# Patient Record
Sex: FEMALE | Race: WHITE | NOT HISPANIC OR LATINO | Employment: OTHER | URBAN - METROPOLITAN AREA
[De-identification: names, ages, dates, MRNs, and addresses within clinical notes are randomized per-mention and may not be internally consistent; named-entity substitution may affect disease eponyms.]

---

## 2018-02-07 ENCOUNTER — MEDICAL CORRESPONDENCE (OUTPATIENT)
Dept: CARDIOLOGY | Facility: CLINIC | Age: 83
End: 2018-02-07
Payer: MEDICARE

## 2018-02-07 ENCOUNTER — TRANSFERRED RECORDS (OUTPATIENT)
Dept: HEALTH INFORMATION MANAGEMENT | Facility: CLINIC | Age: 83
End: 2018-02-07

## 2018-02-07 PROCEDURE — 99214 OFFICE O/P EST MOD 30 MIN: CPT | Mod: ZP | Performed by: INTERNAL MEDICINE

## 2021-02-17 ENCOUNTER — TRANSFERRED RECORDS (OUTPATIENT)
Dept: HEALTH INFORMATION MANAGEMENT | Facility: CLINIC | Age: 86
End: 2021-02-17

## 2021-03-01 ENCOUNTER — TRANSFERRED RECORDS (OUTPATIENT)
Dept: HEALTH INFORMATION MANAGEMENT | Facility: CLINIC | Age: 86
End: 2021-03-01

## 2021-03-01 LAB — EJECTION FRACTION: NORMAL %

## 2021-03-09 ENCOUNTER — TRANSFERRED RECORDS (OUTPATIENT)
Dept: HEALTH INFORMATION MANAGEMENT | Facility: CLINIC | Age: 86
End: 2021-03-09

## 2021-03-18 ENCOUNTER — TRANSFERRED RECORDS (OUTPATIENT)
Dept: HEALTH INFORMATION MANAGEMENT | Facility: CLINIC | Age: 86
End: 2021-03-18

## 2021-03-18 ENCOUNTER — MEDICAL CORRESPONDENCE (OUTPATIENT)
Dept: CARDIOLOGY | Facility: CLINIC | Age: 86
End: 2021-03-18

## 2021-03-18 LAB
CREAT SERPL-MCNC: 1.66 MG/DL
GFR SERPL CREATININE-BSD FRML MDRD: 29.3 ML/MIN/1.73M2

## 2021-03-22 ENCOUNTER — CARE COORDINATION (OUTPATIENT)
Dept: CARDIOLOGY | Facility: CLINIC | Age: 86
End: 2021-03-22

## 2021-03-22 DIAGNOSIS — I71.9 AORTIC ANEURYSM (H): ICD-10-CM

## 2021-03-22 DIAGNOSIS — Z95.2 S/P AVR (AORTIC VALVE REPLACEMENT): ICD-10-CM

## 2021-03-22 DIAGNOSIS — I50.22 CHRONIC SYSTOLIC HEART FAILURE (H): Primary | ICD-10-CM

## 2021-03-22 DIAGNOSIS — I42.8 NONISCHEMIC CARDIOMYOPATHY (H): ICD-10-CM

## 2021-03-22 DIAGNOSIS — I42.0 DILATED CARDIOMYOPATHY (H): ICD-10-CM

## 2021-03-22 DIAGNOSIS — I35.1 AORTIC VALVE INSUFFICIENCY, ETIOLOGY OF CARDIAC VALVE DISEASE UNSPECIFIED: ICD-10-CM

## 2021-03-24 PROBLEM — I71.9 AORTIC ANEURYSM (H): Status: ACTIVE | Noted: 2021-03-24

## 2021-03-24 PROBLEM — Z95.2 S/P AVR (AORTIC VALVE REPLACEMENT): Status: ACTIVE | Noted: 2021-03-24

## 2021-03-24 PROBLEM — I42.8 NONISCHEMIC CARDIOMYOPATHY (H): Status: ACTIVE | Noted: 2021-03-24

## 2021-03-24 PROBLEM — I35.1 AORTIC INSUFFICIENCY: Status: ACTIVE | Noted: 2021-03-24

## 2021-03-24 LAB
CREAT SERPL-MCNC: 1.75 MG/DL
GFR SERPL CREATININE-BSD FRML MDRD: 27 ML/MIN/1.73M2

## 2021-03-30 DIAGNOSIS — I48.0 PAROXYSMAL ATRIAL FIBRILLATION (H): Primary | ICD-10-CM

## 2021-03-30 DIAGNOSIS — I71.9 AORTIC ANEURYSM (H): ICD-10-CM

## 2021-03-30 DIAGNOSIS — I42.8 NONISCHEMIC CARDIOMYOPATHY (H): ICD-10-CM

## 2021-04-02 ENCOUNTER — CARE COORDINATION (OUTPATIENT)
Dept: CARDIOLOGY | Facility: CLINIC | Age: 86
End: 2021-04-02

## 2021-04-02 RX ORDER — ACETAMINOPHEN 500 MG
500-1000 TABLET ORAL EVERY 8 HOURS PRN
COMMUNITY

## 2021-04-02 RX ORDER — DIGOXIN 125 MCG
125 TABLET ORAL
COMMUNITY

## 2021-04-02 RX ORDER — OMEPRAZOLE 40 MG/1
40 CAPSULE, DELAYED RELEASE ORAL DAILY
COMMUNITY

## 2021-04-02 RX ORDER — WARFARIN SODIUM 2.5 MG/1
TABLET ORAL
Status: ON HOLD | COMMUNITY
End: 2021-06-26

## 2021-04-02 RX ORDER — POTASSIUM CHLORIDE 750 MG/1
2 TABLET, EXTENDED RELEASE ORAL
COMMUNITY

## 2021-04-02 RX ORDER — PAROXETINE 10 MG/1
10 TABLET, FILM COATED ORAL EVERY MORNING
COMMUNITY

## 2021-04-02 RX ORDER — CALCIUM CARBONATE 500 MG/1
1 TABLET, CHEWABLE ORAL EVERY 4 HOURS PRN
COMMUNITY

## 2021-04-02 RX ORDER — FERROUS SULFATE 325(65) MG
TABLET ORAL
COMMUNITY

## 2021-04-02 RX ORDER — LOVASTATIN 40 MG
40 TABLET ORAL AT BEDTIME
COMMUNITY

## 2021-04-02 RX ORDER — TORSEMIDE 20 MG/1
20 TABLET ORAL DAILY
COMMUNITY

## 2021-04-02 RX ORDER — TRIAMCINOLONE ACETONIDE 55 UG/1
2 SPRAY, METERED NASAL DAILY PRN
COMMUNITY

## 2021-04-02 RX ORDER — FEXOFENADINE HCL 180 MG/1
180 TABLET ORAL DAILY
COMMUNITY

## 2021-04-02 RX ORDER — CALCITRIOL 0.25 UG/1
0.25 CAPSULE, LIQUID FILLED ORAL DAILY
COMMUNITY

## 2021-04-02 RX ORDER — METOPROLOL SUCCINATE 25 MG/1
25 TABLET, EXTENDED RELEASE ORAL DAILY
Status: ON HOLD | COMMUNITY
End: 2021-06-26

## 2021-04-02 RX ORDER — VITS A,C,E/LUTEIN/MINERALS 300MCG-200
1 TABLET ORAL 2 TIMES DAILY
COMMUNITY

## 2021-04-02 NOTE — PROGRESS NOTES
Patient's last creatinine was 1.75 with GFR 27.  Called and instructed her to hold her torsemide on Sunday and Monday am. She will have labs on arrival for CT.  In addition, patient is taking metoprolol daily. Called and requested she take it early before she arrives for CTA/coronaries and drink extra water on Sunday. Dr. Good informed of creatinine via staff message earlier today.

## 2021-04-05 ENCOUNTER — HOSPITAL ENCOUNTER (OUTPATIENT)
Dept: CT IMAGING | Facility: CLINIC | Age: 86
End: 2021-04-05
Attending: INTERNAL MEDICINE
Payer: MEDICARE

## 2021-04-05 ENCOUNTER — OFFICE VISIT (OUTPATIENT)
Dept: CARDIOLOGY | Facility: CLINIC | Age: 86
End: 2021-04-05
Attending: SURGERY
Payer: MEDICARE

## 2021-04-05 VITALS
HEART RATE: 61 BPM | OXYGEN SATURATION: 96 % | RESPIRATION RATE: 16 BRPM | DIASTOLIC BLOOD PRESSURE: 68 MMHG | SYSTOLIC BLOOD PRESSURE: 134 MMHG

## 2021-04-05 VITALS
HEART RATE: 60 BPM | BODY MASS INDEX: 24.07 KG/M2 | WEIGHT: 130.8 LBS | OXYGEN SATURATION: 93 % | SYSTOLIC BLOOD PRESSURE: 112 MMHG | DIASTOLIC BLOOD PRESSURE: 56 MMHG | HEIGHT: 62 IN

## 2021-04-05 DIAGNOSIS — I42.8 NONISCHEMIC CARDIOMYOPATHY (H): ICD-10-CM

## 2021-04-05 DIAGNOSIS — Z95.2 S/P AVR (AORTIC VALVE REPLACEMENT): ICD-10-CM

## 2021-04-05 DIAGNOSIS — I48.0 PAROXYSMAL ATRIAL FIBRILLATION (H): ICD-10-CM

## 2021-04-05 DIAGNOSIS — I42.0 DILATED CARDIOMYOPATHY (H): ICD-10-CM

## 2021-04-05 DIAGNOSIS — I71.9 AORTIC ANEURYSM (H): ICD-10-CM

## 2021-04-05 DIAGNOSIS — I71.20 THORACIC AORTIC ANEURYSM WITHOUT RUPTURE (H): Primary | ICD-10-CM

## 2021-04-05 DIAGNOSIS — I35.1 AORTIC VALVE INSUFFICIENCY, ETIOLOGY OF CARDIAC VALVE DISEASE UNSPECIFIED: ICD-10-CM

## 2021-04-05 DIAGNOSIS — I50.22 CHRONIC SYSTOLIC HEART FAILURE (H): ICD-10-CM

## 2021-04-05 PROBLEM — E78.5 HYPERLIPIDEMIA LDL GOAL <130: Status: ACTIVE | Noted: 2021-04-05

## 2021-04-05 PROBLEM — U07.1 CLINICAL DIAGNOSIS OF COVID-19: Status: ACTIVE | Noted: 2021-04-05

## 2021-04-05 PROBLEM — G47.33 OSA (OBSTRUCTIVE SLEEP APNEA): Status: ACTIVE | Noted: 2021-04-05

## 2021-04-05 PROBLEM — D64.9 ANEMIA: Status: ACTIVE | Noted: 2021-04-05

## 2021-04-05 PROBLEM — K21.9 GASTROESOPHAGEAL REFLUX DISEASE WITHOUT ESOPHAGITIS: Status: ACTIVE | Noted: 2021-04-05

## 2021-04-05 PROBLEM — M85.80 OSTEOPENIA: Status: ACTIVE | Noted: 2021-04-05

## 2021-04-05 PROBLEM — K92.2 GI BLEED: Status: ACTIVE | Noted: 2021-04-05

## 2021-04-05 PROBLEM — M31.6 TEMPORAL GIANT CELL ARTERITIS (H): Status: ACTIVE | Noted: 2021-04-05

## 2021-04-05 PROBLEM — H35.30 MACULAR DEGENERATION: Status: ACTIVE | Noted: 2021-04-05

## 2021-04-05 PROBLEM — C85.10 LARGE B-CELL LYMPHOMA (H): Status: ACTIVE | Noted: 2021-04-05

## 2021-04-05 PROBLEM — Z90.49 HISTORY OF CHOLECYSTECTOMY: Status: ACTIVE | Noted: 2021-04-05

## 2021-04-05 PROBLEM — Z95.810 ICD (IMPLANTABLE CARDIOVERTER-DEFIBRILLATOR) IN PLACE: Status: ACTIVE | Noted: 2021-04-05

## 2021-04-05 PROBLEM — Z85.118 HISTORY OF LUNG CANCER: Status: ACTIVE | Noted: 2021-04-05

## 2021-04-05 PROBLEM — Z90.2 HISTORY OF LOBECTOMY OF LUNG: Status: ACTIVE | Noted: 2021-04-05

## 2021-04-05 LAB
6 MIN WALK (FT): 838 FT
6 MIN WALK (M): 255 M
ALBUMIN SERPL-MCNC: 3.6 G/DL (ref 3.4–5)
ALP SERPL-CCNC: 50 U/L (ref 40–150)
ALT SERPL W P-5'-P-CCNC: 18 U/L (ref 0–50)
ANION GAP SERPL CALCULATED.3IONS-SCNC: 5 MMOL/L (ref 3–14)
AST SERPL W P-5'-P-CCNC: 12 U/L (ref 0–45)
BILIRUB SERPL-MCNC: 0.6 MG/DL (ref 0.2–1.3)
BUN SERPL-MCNC: 30 MG/DL (ref 7–30)
CALCIUM SERPL-MCNC: 9.5 MG/DL (ref 8.5–10.1)
CHLORIDE SERPL-SCNC: 107 MMOL/L (ref 94–109)
CO2 SERPL-SCNC: 27 MMOL/L (ref 20–32)
CREAT SERPL-MCNC: 1.45 MG/DL (ref 0.52–1.04)
ERYTHROCYTE [DISTWIDTH] IN BLOOD BY AUTOMATED COUNT: 17.6 % (ref 10–15)
GFR SERPL CREATININE-BSD FRML MDRD: 32 ML/MIN/{1.73_M2}
GLUCOSE SERPL-MCNC: 111 MG/DL (ref 70–99)
HCT VFR BLD AUTO: 29.2 % (ref 35–47)
HGB BLD-MCNC: 9.8 G/DL (ref 11.7–15.7)
INR PPP: 2.4 (ref 0.86–1.14)
MCH RBC QN AUTO: 31.2 PG (ref 26.5–33)
MCHC RBC AUTO-ENTMCNC: 33.6 G/DL (ref 31.5–36.5)
MCV RBC AUTO: 93 FL (ref 78–100)
PLATELET # BLD AUTO: 141 10E9/L (ref 150–450)
POTASSIUM SERPL-SCNC: 4.1 MMOL/L (ref 3.4–5.3)
PROT SERPL-MCNC: 6.8 G/DL (ref 6.8–8.8)
RBC # BLD AUTO: 3.14 10E12/L (ref 3.8–5.2)
SODIUM SERPL-SCNC: 140 MMOL/L (ref 133–144)
WBC # BLD AUTO: 6.5 10E9/L (ref 4–11)

## 2021-04-05 PROCEDURE — 71275 CT ANGIOGRAPHY CHEST: CPT | Mod: 26 | Performed by: STUDENT IN AN ORGANIZED HEALTH CARE EDUCATION/TRAINING PROGRAM

## 2021-04-05 PROCEDURE — G1004 CDSM NDSC: HCPCS | Performed by: STUDENT IN AN ORGANIZED HEALTH CARE EDUCATION/TRAINING PROGRAM

## 2021-04-05 PROCEDURE — G0463 HOSPITAL OUTPT CLINIC VISIT: HCPCS | Mod: 25

## 2021-04-05 PROCEDURE — 85027 COMPLETE CBC AUTOMATED: CPT | Performed by: INTERNAL MEDICINE

## 2021-04-05 PROCEDURE — 85610 PROTHROMBIN TIME: CPT | Performed by: INTERNAL MEDICINE

## 2021-04-05 PROCEDURE — 250N000013 HC RX MED GY IP 250 OP 250 PS 637: Performed by: STUDENT IN AN ORGANIZED HEALTH CARE EDUCATION/TRAINING PROGRAM

## 2021-04-05 PROCEDURE — 71275 CT ANGIOGRAPHY CHEST: CPT | Mod: ME

## 2021-04-05 PROCEDURE — 250N000011 HC RX IP 250 OP 636: Performed by: INTERNAL MEDICINE

## 2021-04-05 PROCEDURE — 36415 COLL VENOUS BLD VENIPUNCTURE: CPT | Performed by: INTERNAL MEDICINE

## 2021-04-05 PROCEDURE — 258N000003 HC RX IP 258 OP 636: Performed by: STUDENT IN AN ORGANIZED HEALTH CARE EDUCATION/TRAINING PROGRAM

## 2021-04-05 PROCEDURE — 99204 OFFICE O/P NEW MOD 45 MIN: CPT | Performed by: SURGERY

## 2021-04-05 PROCEDURE — 75574 CT ANGIO HRT W/3D IMAGE: CPT | Mod: 26 | Performed by: STUDENT IN AN ORGANIZED HEALTH CARE EDUCATION/TRAINING PROGRAM

## 2021-04-05 PROCEDURE — 94618 PULMONARY STRESS TESTING: CPT

## 2021-04-05 PROCEDURE — 75574 CT ANGIO HRT W/3D IMAGE: CPT | Mod: MG

## 2021-04-05 PROCEDURE — 80053 COMPREHEN METABOLIC PANEL: CPT | Performed by: INTERNAL MEDICINE

## 2021-04-05 RX ORDER — NITROGLYCERIN 0.4 MG/1
.4-.8 TABLET SUBLINGUAL
Status: DISCONTINUED | OUTPATIENT
Start: 2021-04-05 | End: 2021-04-06 | Stop reason: HOSPADM

## 2021-04-05 RX ORDER — DIPHENHYDRAMINE HYDROCHLORIDE 50 MG/ML
25-50 INJECTION INTRAMUSCULAR; INTRAVENOUS
Status: DISCONTINUED | OUTPATIENT
Start: 2021-04-05 | End: 2021-04-06 | Stop reason: HOSPADM

## 2021-04-05 RX ORDER — METOPROLOL TARTRATE 1 MG/ML
5-15 INJECTION, SOLUTION INTRAVENOUS
Status: DISCONTINUED | OUTPATIENT
Start: 2021-04-05 | End: 2021-04-06 | Stop reason: HOSPADM

## 2021-04-05 RX ORDER — ONDANSETRON 2 MG/ML
4 INJECTION INTRAMUSCULAR; INTRAVENOUS
Status: DISCONTINUED | OUTPATIENT
Start: 2021-04-05 | End: 2021-04-06 | Stop reason: HOSPADM

## 2021-04-05 RX ORDER — METOPROLOL TARTRATE 25 MG/1
25-100 TABLET, FILM COATED ORAL
Status: COMPLETED | OUTPATIENT
Start: 2021-04-05 | End: 2021-04-05

## 2021-04-05 RX ORDER — IOPAMIDOL 755 MG/ML
120 INJECTION, SOLUTION INTRAVASCULAR ONCE
Status: COMPLETED | OUTPATIENT
Start: 2021-04-05 | End: 2021-04-05

## 2021-04-05 RX ORDER — ACYCLOVIR 200 MG/1
0-1 CAPSULE ORAL
Status: DISCONTINUED | OUTPATIENT
Start: 2021-04-05 | End: 2021-04-06 | Stop reason: HOSPADM

## 2021-04-05 RX ORDER — LOSARTAN POTASSIUM 25 MG/1
12.5 TABLET ORAL AT BEDTIME
Status: ON HOLD | COMMUNITY
Start: 2020-07-17 | End: 2021-06-26

## 2021-04-05 RX ORDER — METHYLPREDNISOLONE SODIUM SUCCINATE 125 MG/2ML
125 INJECTION, POWDER, LYOPHILIZED, FOR SOLUTION INTRAMUSCULAR; INTRAVENOUS
Status: DISCONTINUED | OUTPATIENT
Start: 2021-04-05 | End: 2021-04-06 | Stop reason: HOSPADM

## 2021-04-05 RX ORDER — DIPHENHYDRAMINE HCL 25 MG
25 CAPSULE ORAL
Status: DISCONTINUED | OUTPATIENT
Start: 2021-04-05 | End: 2021-04-06 | Stop reason: HOSPADM

## 2021-04-05 RX ORDER — IVABRADINE 5 MG/1
5-15 TABLET, FILM COATED ORAL
Status: COMPLETED | OUTPATIENT
Start: 2021-04-05 | End: 2021-04-05

## 2021-04-05 RX ADMIN — SODIUM CHLORIDE 250 ML: 0.9 INJECTION, SOLUTION INTRAVENOUS at 09:00

## 2021-04-05 RX ADMIN — NITROGLYCERIN 0.8 MG: 0.4 TABLET SUBLINGUAL at 10:19

## 2021-04-05 RX ADMIN — IOPAMIDOL 120 ML: 755 INJECTION, SOLUTION INTRAVENOUS at 11:05

## 2021-04-05 RX ADMIN — METOPROLOL TARTRATE 25 MG: 25 TABLET, FILM COATED ORAL at 09:15

## 2021-04-05 RX ADMIN — IVABRADINE 15 MG: 5 TABLET, FILM COATED ORAL at 09:15

## 2021-04-05 ASSESSMENT — PAIN SCALES - GENERAL: PAINLEVEL: NO PAIN (0)

## 2021-04-05 ASSESSMENT — MIFFLIN-ST. JEOR: SCORE: 986.55

## 2021-04-05 NOTE — LETTER
4/5/2021      RE: Racquel Emmanuel  [address] [facility] [address]       Service Date: 04/05/2021    I was requested to see Ms. Emmanuel for evaluation of  ascending aortic aneurysm.    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  She had previously had an aortic valve replacement in the [facility].  The patient does complain of some shortness of breath with fatigue and exertion.  She denies any fever, chills, syncope, palpitations or chest pain. The patient was seen by my colleague, Dr. Kadi Good, in 2018 to assess her functional capacity given her lobectomy for lung cancer.  She also had an opinion at the [facility] and decided not to have surgery.  Last year both she and her  developed COVID.  She also had a significant subdural hematoma and delayed any evaluation for surgery.    PAST MEDICAL HISTORY:    1.  Nonischemic cardiomyopathy, status post aortic valve replacement.    2.  Large-cell lymphoma.  3.  Hypertension.  4.  Gastroesophageal reflux disease.  5.  Anemia.  6.  Dyslipidemia.  7.  Aortic aneurysm.    PAST SURGICAL HISTORY:  Aortic valve replacement in 2015, right middle lobectomy, hysterectomy, cataracts.    SOCIAL HISTORY:  Denies current alcohol, drug or tobacco abuse.    FAMILY HISTORY:  Reviewed.  Medication list reviewed.    REVIEW OF SYSTEMS:  A 10-point review of systems unremarkable other than noted in history and physical.    PHYSICAL EXAMINATION:    VITAL SIGNS:  She is afebrile, blood pressure 140/74, heart rate 68.   GENERAL:  She is awake, alert, oriented x3, appears comfortable at rest.  HEART:  S1, S2 normal, no S3, no murmurs.  RESPIRATORY:  Bibasilar rhonchi or crepitations.  ABDOMEN:  Bowel sounds present, nondistended, nontender.   LOWER EXTREMITIES:  Warm and perfused.  No pedal edema.    NEUROLOGIC:  No focal motor deficits.   EYES:  Pupils equal and reactive.   VASCULAR:  Pulses are normal.  ENT:  Within normal limits.    IMAGING STUDIES:  Review of CT scan, which showed a very large saccular aneurysm of the proximal ascending aorta.  The saccular aneurysm's depth at this level is 43 mm, and the maximal size of the saccular aneurysm is 81 mm.  The aneurysm neck is 21 mm.  A second smaller saccular aneurysm also emerges from the anterior aspect of the ascending aorta.  CT angiogram shows no hemodynamically significant coronary artery disease.  There is also a bioprosthetic valve noted in the aortic position.    ASSESSMENT AND PLAN:  An 86-year-old female status post aortic valve replacement in 2015, now with enlarging ascending aortic aneurysm.  In my opinion, this seems likely related in some ways to her surgery.  Given her age and comorbid conditions, surgery is clearly high-risk.  Given its size, though I think I would be willing to offer surgery.  I did discuss the risks and benefits of surgery with the patient and family inlcuding risk of death, stroke, bleeding and infection and they wished to consider and think about it before deciding on surgery.  If you have any questions regarding this care, please contact me.    Gokul Smith MD    cc:  Kadi Good MD       D: 2021   T: 2021   MT: RUSTYMT    Name:     ABRAM BALDERAS  MRN:      [id number]        Account:      [id number]   :      1935           Service Date: 2021       Document: [id number]

## 2021-04-05 NOTE — NURSING NOTE
Chief Complaint   Patient presents with     New Patient     New consult for redo sternotomy, ascending aortic aneurysm repair     Vitals were taken and medication reconciled.    IRAIS Kamara  3:12 PM

## 2021-04-05 NOTE — LETTER
4/5/2021      RE: Racquel Emmanuel  [address] [facility] [address]       Dear Colleague,    Thank you for the opportunity to participate in the care of your patient, Racquel Emmanuel, at the [facility] [place] [facility]. Please see a copy of my visit note below.    See dictated.      Please do not hesitate to contact me if you have any questions/concerns.     Sincerely,     Gokul Smith MD

## 2021-04-06 LAB — FIO2-PRE: 24 %

## 2021-04-06 NOTE — NURSING NOTE
Patient seen today for consultation for ascending aortic aneurysm repair    Surgery procedure explained to patient and her spouse and daughter and all questions and concerns were answered and addressed.     Risks and benefits of surgery were discussed with patient (and all present) including risk of death, stroke, bleeding, cardiac ischemia, wound infection, renal failure, arrhythmias and possible pacemaker implantation.  Risks of serious complications are ~ 10% for this patient. Patient accepts these risks and is willing to proceed with surgery.     Dr Smith to review patient's CT with colleagues and make decision if surgery is best way to proceed.    Pre surgery preparation folder with instructions for surgery preparation and recovery will be mailed to the patient if patient goes forward with surgery.      Patient and family verbalized understanding of all instructions and will call with any questions or concerns.

## 2021-04-08 ENCOUNTER — CARE COORDINATION (OUTPATIENT)
Dept: CARDIOLOGY | Facility: CLINIC | Age: 86
End: 2021-04-08

## 2021-04-08 NOTE — PROGRESS NOTES
Per Dr Smith he will perform the aneurysm repair for this patient as joint surgery with Dr Miracle Jordan. Called patient and discussed surgery and she will talk it over with her  and children and call this nurse on Monday 04/12.

## 2021-04-15 ENCOUNTER — CARE COORDINATION (OUTPATIENT)
Dept: CARDIOLOGY | Facility: CLINIC | Age: 86
End: 2021-04-15

## 2021-04-15 DIAGNOSIS — Z01.810 PRE-OPERATIVE CARDIOVASCULAR EXAMINATION: Primary | ICD-10-CM

## 2021-04-15 DIAGNOSIS — Z79.01 LONG TERM CURRENT USE OF ANTICOAGULANT THERAPY: ICD-10-CM

## 2021-04-15 DIAGNOSIS — R09.89 OTHER SPECIFIED SYMPTOMS AND SIGNS INVOLVING THE CIRCULATORY AND RESPIRATORY SYSTEMS: ICD-10-CM

## 2021-04-15 NOTE — PROGRESS NOTES
Called patient to discuss scheduling of surgery.  Patient would like to wait until after Mother's Day on May 9th to have surgery.  Also discussed having pre op one week prior to surgery to give the surgeons and anesthesia/PAC time to be sure patient is optimized prior to procedure.  Patient agreed to this plan.  Patient will call this writer with the name and phone to her dentist and this nurse will fax dental clearance to them for signing.     Entered all pre op orders and sent  a message to schedule.

## 2021-04-16 ENCOUNTER — PREP FOR PROCEDURE (OUTPATIENT)
Dept: CARDIOLOGY | Facility: CLINIC | Age: 86
End: 2021-04-16

## 2021-04-16 ENCOUNTER — CARE COORDINATION (OUTPATIENT)
Dept: CARDIOLOGY | Facility: CLINIC | Age: 86
End: 2021-04-16

## 2021-04-16 DIAGNOSIS — I71.21 ASCENDING AORTIC ANEURYSM (H): Primary | ICD-10-CM

## 2021-04-16 NOTE — PROGRESS NOTES
Patient's daughter Dona called to discuss her mother's surgery, outcomes, recovery and risks if she goes through with surgery.  Of note daughter states her mother has a hx of lung cancer and was recently found to have three new lung nodules by her Oncologist in [place].  These nodules were also noted on her CT chest completed here at the [facility].  Patient is is undergo a PET scan in [place] but not until June.  Suggested to daughter to call the oncologist to move PET to early May so the diagnosis will be known prior to heart surgery.  Daughter states she will call.  Per Dona she and her sister do not want her mother to undergo this surgery but the patient and her spouse want her to go through with it.  Will update Dr Smith and Dr Jordan with lung nodule findings. Sent list of pre op tests and surgery dates to  and she will call patient to schedule.

## 2021-04-19 NOTE — TELEPHONE ENCOUNTER
FUTURE VISIT INFORMATION      SURGERY INFORMATION:    jg ozuna/luzmaria    Consult: ov     RECORDS REQUESTED FROM:       Primary Care Provider: Nina Orellana MD- [facility]    Pertinent Medical History: taylor, aortic insufficiency, aortic aneurysm, paroxysmal atrial fibrillation     Most recent EKG+ Tracin/15/21    Most recent ECHO: 19- [place]    Most recent Coronary Angiogram: 21    Most recent PFT's: 21

## 2021-04-20 PROBLEM — I71.21 ASCENDING AORTIC ANEURYSM (H): Status: ACTIVE | Noted: 2021-04-20

## 2021-04-26 DIAGNOSIS — Z79.01 LONG TERM CURRENT USE OF ANTICOAGULANT THERAPY: Primary | ICD-10-CM

## 2021-04-26 NOTE — LETTER
April 26, 2021          Dear DAISY Clement [facility]    CARDIOTHORACIC SURGERY PRE-OP INSTRUCTIONS  Your Heart Surgery is scheduled with Dr Gokul Smith and Dr Magnolia Jordan  On Wednesday May 26th at 7:30 am.  Please arrive at 5:30 am.      Report to the information desk in the front lobby of the hospital at [address] [facility], [place] [phone]. When you walk in the main entrance of the hospital it is directly in front of you. Ask for an escort or the  can help direct you. You will then be escorted or directed to  on the third floor for your surgery preparation.     Beginning January 21st.  [facility] has updated its visitor policy.  At this time due to the Coronavirus, only one consistent visitor will be allowed per adult patient for the patient's entire stay.  Surgical patients can have one visitor only during the preoperative phase, and one person may escort an adult patient to their outpatient clinic appointments.  All visitors will be screened, each time they visit, and must pass screening before entry.  Individuals who are sick and not seeking care or have known exposure to COVID 19 are not allowed to visit.  Visitors under the age of 18 are not allowed to enter. Hospital visiting hours are 8:00 am to 8:30 pm. Visiting policies will be strictly enforced.  This policy is subject to change as the COVID virus continues to evolve. Masks are required 100% of the time.     COVID 19 TESTING  You will be required to undergo COVID 19 testing prior the angiogram and your surgery.  You will be contacted to schedule this test within 48 hours of your surgery.      After the COVID test please protect yourself by following the CDC recommendations for disease prevention.  Wash hands regularly with soap and water and use hand  if soap and water is not available, wear a face mask, separate yourself from others by 6 feet and keep your hands away from your face.        Call  your surgery coordinator Staci Quintanilla RN or the [facility] number ([phone]) if you develop   any of the following symptoms; fever, cough, shortness of breath, sore throat, runny or stuffy nose, muscle or body aches, headaches, fatigue, vomiting or diarrhea.      You will spend approximately 5 - 7 days in the hospital depending on how quickly you recover.      INSTRUCTIONS PRIOR TO SURGERY    Please review this letter and the enclosed booklet and other information in this surgery folder carefully and call Staci Quintanilla RN at [phone] with any questions or concerns.      MEDICATIONS    ASPIRIN is okay to take up to the day before your surgery but NOT the day of your surgery. Take your other medications as you normally would until the day of surgery unless instructed otherwise. If you do not take aspirin do not start aspirin unless instructed otherwise.      IF you are taking a blood thinner, (Coumadin, Warfarin, Plavix, Pradaxa, Effient, Brilinta, Pletal, Eliquis, Xarelto or other antiplatelet),  please inform your surgery team as soon as possible as  these medications may need to be stopped for several days (3-7) prior your surgery.    We will need to bridge your coumadin (Warfarin) with enoxaparin (Lovenox) for a few days prior to surgery. Please take your last dose of coumadin on Thursday May 20th.  Have your primary care clinic check your INR on Friday May 21st in the morning and call Staci Quintanilla RN with the results.  If your INR is subtherapeutic you will be instructed to take the enoxaparin injections as follows:     1. On Saturday May 22 start your 1st dose of enoxaparin in the evening.   2. On Sunday and Monday May 23rd and May 24th inject one enoxaparin shot in the morning and one in     the evening 12 hours apart.  3. On Tuesday May 25th inject the last enoxaparin shot in the morning.     A prescription for Lovenon (enoxaparin) was sent to the [facility] pharmacy.      STOP TAKING  these medications. STOP ALL ANTIINFLAMMATORY MEDICATIONS: (Ibuprofen, Aleve, Advil, Celebrex, Votaren, Ketoprofen, and Naproxen).  Stop ALL SUPPLEMENTS 10 days prior to your surgery. This includes stopping Co-Q 10, vitamin E and all fish oil supplements.   Please check the labels on any OTC eye vitamins you may be taking.  They often contain vitamin E and should be stopped 10 days prior to surgery.      HISTORY AND PHYSICAL:  LABS and IMAGING  All tests and procedures must be within 30 days of your surgery date.     You do NOT need to fast for the blood work.      You have a pre-op clinic visits beginning at Noon on Monday May 10th in the [facility], [address] [place] [address]. A  or Coordinator will assist you. The Pre Assessment/Anesthesia Clinic is on the fifth floor.  The laboratory and radiology is on the first floor.          Your schedule is as follows:    12:00 pm Carotid Ultrasound  1:15 pm Chest X-Ray  1:40 pm EKG  2:00 pm Labs  2:45 pm PAC (Pre Assessment and Anesthesia Clinic)  This is your pre op physical  4:00 pm Clinic visit with Dr Smith      DO NOT EAT ANY SOLID FOODS AFTER MIDNIGHT or the morning of your surgery. NO MILK, MILK PRODUCTS, SMOOTHIES 8 HOURS PRIOR TO SURGERY.      DO NOT EAT ANYTHING, however you may have any clear liquids; water, black coffee (sugar okay), clear tea, sprite, ginger ale, apple juice, Gatorade or any clear liquid up to two hours before your surgery time. (No chase tea) No milk, or milk products, no smoothies or juice with pulp.     No alcohol for 24 hours prior to surgery.       MEDICATIONS THE MORNING OF SURGERY  Take your metoprolol (Toprol XL), omeprazole (Prilosec) and paroxetine (Paxil) the morning of your surgery with a drink of clear liquid.  Please tell your anesthesiologist what medication you took and at what time.       BELONGINGS  Do not bring personal belongings, jewelry, money, valuables, toiletries or  medications to the hospital the morning of your surgery. You may pack a bag and give it to a family member or friend to bring the following day or when needed.   Please remove all jewelry, including body piercings. Cautery instruments are used during surgery that may pose a risk of burns if a body piercing is left in during surgery.       Do bring a photo ID and insurance card.  A copy of your health care directives, if you have one.  Glasses and hearing aids (bring cases).  You cannot wear contact lenses during the surgery.  Inhaler(s) and eye drops if you use them, tell the staff about them when you arrive. Bring your CPAP machine or breathing device, if you use them.  If you have a pacemaker or ICD (cardiac defibrillator) bring the ID card.  If you have an implanted stimulator, bring the remote control.  If you are a legal guardian bring a copy of the certified (court-stamped) guardianship papers.        Call Odessa our patient , with any questions or concerns about scheduling. She can be reached by phone at [phone] between 8 AM and 4:30PM Monday through Friday. Our answering service will  calls outside of those business hours.   If you have questions about your medications, test results or have a change in your health status call Staci Quintanilla RN at [phone] during regular business hours.                On weekends or after 4:30 please call [phone] and ask the  to page the Cardiothoracic Fellow, Nurse Practitioner, Physician Assistant or Staff on call that weekend.     Please feel free to call me or our office with any questions.    Thank you,         Staci Quintanilla RN  Cardiothoracic Surgery Coordinator  [phone]  Direct Phone  [phone]  Fax

## 2021-04-26 NOTE — PROGRESS NOTES
Called and spoke to patient regarding her surgery date and pre op scheduled.  Also reviewed with patient her Lovenox bridging for her coumadin and INR needed on May 21st.  Will send script for Lovenox to patient's pharmacy in [facility].  Patient verbalized understanding of all information and mailed patient letter, pre op education folder and map.    Ordered enoxaparin 0.6 ml per injection for 4 days prior to surgery.  Patient's creatine clearance is 30.5, borderline for weight based dosing.  Ok per Daryn Jiménez PA-C and coumadin clinic with provider ok.     Date: 4/26/2021    Time of Call: 1:49 PM     Diagnosis:  Long term use anticoagualtion     [ TORB ] Ordering provider: Daryn Jiménez PA-C    Order: Lovenox 0.6 ml per injecton     Order received by: Staci Quintanilla RN       Follow-up/additional notes: patient verbalized understanding, instructions mailed to patient.

## 2021-05-07 NOTE — PROGRESS NOTES
Service Date: 04/05/2021    I was requested to see Ms. Emmanuel for evaluation of  ascending aortic aneurysm.    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  She had previously had an aortic valve replacement in the [facility].  The patient does complain of some shortness of breath with fatigue and exertion.  She denies any fever, chills, syncope, palpitations or chest pain. The patient was seen by my colleague, Dr. Kadi Good, in 2018 to assess her functional capacity given her lobectomy for lung cancer.  She also had an opinion at the [facility] and decided not to have surgery.  Last year both she and her  developed COVID.  She also had a significant subdural hematoma and delayed any evaluation for surgery.    PAST MEDICAL HISTORY:    1.  Nonischemic cardiomyopathy, status post aortic valve replacement.    2.  Large-cell lymphoma.  3.  Hypertension.  4.  Gastroesophageal reflux disease.  5.  Anemia.  6.  Dyslipidemia.  7.  Aortic aneurysm.    PAST SURGICAL HISTORY:  Aortic valve replacement in 2015, right middle lobectomy, hysterectomy, cataracts.    SOCIAL HISTORY:  Denies current alcohol, drug or tobacco abuse.    FAMILY HISTORY:  Reviewed.  Medication list reviewed.    REVIEW OF SYSTEMS:  A 10-point review of systems unremarkable other than noted in history and physical.    PHYSICAL EXAMINATION:    VITAL SIGNS:  She is afebrile, blood pressure 140/74, heart rate 68.   GENERAL:  She is awake, alert, oriented x3, appears comfortable at rest.  HEART:  S1, S2 normal, no S3, no murmurs.  RESPIRATORY:  Bibasilar rhonchi or crepitations.  ABDOMEN:  Bowel sounds present, nondistended, nontender.   LOWER EXTREMITIES:  Warm and perfused.  No pedal edema.   NEUROLOGIC:  No focal motor deficits.   EYES:  Pupils equal and reactive.   VASCULAR:   Pulses are normal.  ENT:  Within normal limits.    IMAGING STUDIES:  Review of CT scan, which showed a very large saccular aneurysm of the proximal ascending aorta.  The saccular aneurysm's depth at this level is 43 mm, and the maximal size of the saccular aneurysm is 81 mm.  The aneurysm neck is 21 mm.  A second smaller saccular aneurysm also emerges from the anterior aspect of the ascending aorta.  CT angiogram shows no hemodynamically significant coronary artery disease.  There is also a bioprosthetic valve noted in the aortic position.    ASSESSMENT AND PLAN:  An 86-year-old female status post aortic valve replacement in 2015, now with enlarging ascending aortic aneurysm.  In my opinion, this seems likely related in some ways to her surgery.  Given her age and comorbid conditions, surgery is clearly high-risk.  Given its size, though I think I would be willing to offer surgery.  I did discuss the risks and benefits of surgery with the patient and family inlcuding risk of death, stroke, bleeding and infection and they wished to consider and think about it before deciding on surgery.  If you have any questions regarding this care, please contact me.    Gokul Smith MD        D: 2021   T: 2021   MT: [facility]    Name:     ABRAM BALDERAS  MRN:      [id number]        Account:      [id number]   :      1935           Service Date: 2021       Document: [id number]    cc:  Kadi Good MD

## 2021-05-07 NOTE — PROGRESS NOTES
Preoperative Assessment Center Medication History Note    Medication history completed on May 7, 2021 by this writer. See Epic admission navigator for prior to admission medications. Operating room staff will still need to confirm medications and last dose information on day of surgery.     Medication history interview sources:  patient interview, med list send from her home pharmacy who packages her meds in pill bubble packs for her ([facility]).     Changes made to PTA medication list (reason)  Added: none  Deleted: none  Changed: digoxin sig, iron dose/sig, losartan sig, paxil dose,     Additional medication history information (including reliability of information, actions taken by pharmacist):    -- Patient declines being on any other prescription or over-the-counter medications    Prior to Admission medications    Medication Sig Last Dose Taking? Auth Provider   acetaminophen (TYLENOL) 500 MG tablet Take 500-1,000 mg by mouth every 8 hours as needed for mild pain Taking Yes Reported, Patient   calcitRIOL (ROCALTROL) 0.25 MCG capsule Take 0.25 mcg by mouth daily Taking Yes Reported, Patient   digoxin (LANOXIN) 125 MCG tablet Take 125 mcg by mouth three times a week Take on Mon,  Wed, Fri Taking Yes Reported, Patient   ferrous sulfate (FEROSUL) 325 (65 Fe) MG tablet Take 1 tablet by mouth twice daily on three days a week (Mon Wed and Fri only) Taking Yes Reported, Patient   fexofenadine (ALLEGRA) 180 MG tablet Take 180 mg by mouth daily Taking Yes Reported, Patient   losartan (COZAAR) 25 MG tablet Take 12.5 mg by mouth At Bedtime  Taking Yes Reported, Patient   lovastatin (MEVACOR) 40 MG tablet Take 40 mg by mouth At Bedtime  Taking Yes Reported, Patient   metoprolol succinate ER (TOPROL-XL) 25 MG 24 hr tablet Take 25 mg by mouth daily Taking Yes Reported, Patient   multivitamin (OCUVITE) TABS tablet Take 1 tablet by mouth 2 times daily Taking Yes Reported, Patient   omeprazole (PRILOSEC) 40 MG  DR capsule Take 40 mg by mouth daily Taking Yes Reported, Patient   PARoxetine (PAXIL) 10 MG tablet Take 10 mg by mouth every morning  Taking Yes Reported, Patient   potassium chloride ER (K-TAB/KLOR-CON) 10 MEQ CR tablet Take 2 tablets by mouth daily (with dinner)  Taking Yes Reported, Patient   torsemide (DEMADEX) 20 MG tablet Take 20 mg by mouth daily Taking Yes Reported, Patient   warfarin ANTICOAGULANT (COUMADIN) 2.5 MG tablet Take 2.5 mg on Wednesday and Take 5 mg on all other days of the week (or as directed). Takes in the evening. Taking Yes Reported, Patient   calcium carbonate (TUMS) 500 MG chewable tablet Take 1 chew tab by mouth every 4 hours as needed for heartburn Unknown  Reported, Patient   enoxaparin ANTICOAGULANT (LOVENOX ANTICOAGULANT) 60 MG/0.6ML syringe 1 injection on 05/22 in evening, 2 injections 05/23 and 05/24 1 in morning & 1 in evening,1 injection 05/25 in morning.  Patient not taking: Reported on 5/7/2021 Not Taking  Gokul Smith MD   triamcinolone (NASACORT) 55 MCG/ACT nasal aerosol Spray 2 sprays into both nostrils daily as needed Unknown  Reported, Patient            Medication history completed by: Reagan [facility]

## 2021-05-07 NOTE — PHARMACY - PREOPERATIVE ASSESSMENT CENTER
Anticoagulation Note - Preoperative Assessment Center (PAC) Pharmacist     Patient was interviewed on May 7, 2021 as a part of PAC clinic appointment. The purpose of this note is to document the perioperative anticoagulation plan outlined by the providers caring for Racquel Emmanuel.     Current Regimen  Anticoagulation Regimen as of May 7, 2021: warfarin 2.5 mg on Wednesday and 5 mg on all other days of the week. Takes in the evening.   Indication: afib (CHADSVASC=5 female, HF, HTN, AGEx2... Denies history of stroke/TIA/VTE). Note does have h/o bioprosthetic AVR  Prescriber:  Dr. Nina Orellana  Expected Duration of therapy: indefinite    Creatinine   Date Value Ref Range Status   04/05/2021 1.45 (H) 0.52 - 1.04 mg/dL Final   CrCl ~22mL/min    Perioperative plan  Racquel Emmanuel is scheduled for redo sternotomy, ascending aortic aneurysm repair on 5/26/21 with Dr. Smith and Dr. Jordan and the perioperative anticoagulation plan is to hold warfarin x5 days pre op (last dose 5/20 PM) and bridge with lovenox.  After discussion with Staci Floyd RN and CVTS team will plan to addend lovenox plan as outlined at the bottom of this note.       Resumption of anticoagulation after procedure will be based on surgery team assessment of bleeding risks and complications.  This plan may require re-assessment and modification by her primary team in the perioperative setting depending on patients clinical situation.        Reagan Franklin, [facility]  May 7, 2021  9:04 AM          Please take your last dose of coumadin on Thursday May 20th.  Have your primary care clinic check your INR on Friday May 21st in the morning and call Staci Quintanilla RN with the results.  If your INR is subtherapeutic you will be instructed to take the enoxaparin injections as follows:      1. On Saturday May 22 start your 1st dose of enoxaparin (LOVENOX) 60 mg subcutaneously in the evening.   2. On Sunday and Monday May 23rd and May 24th inject one enoxaparin  (LOVENOX) shot in the in the EVENING ONLY (only once daily dosing due to kidney dysfunction).   3. On Tuesday May 25th Do NOT inject any enoxaparin (LOVENOX).      A prescription for Lovenox (enoxaparin) was sent to the [facility] pharmacy. Will have different instructions on the Rx, please instead follow the above instructions.

## 2021-05-10 ENCOUNTER — ANCILLARY PROCEDURE (OUTPATIENT)
Dept: GENERAL RADIOLOGY | Facility: CLINIC | Age: 86
End: 2021-05-10
Attending: SURGERY
Payer: MEDICARE

## 2021-05-10 ENCOUNTER — ANCILLARY PROCEDURE (OUTPATIENT)
Dept: ULTRASOUND IMAGING | Facility: CLINIC | Age: 86
End: 2021-05-10
Attending: SURGERY
Payer: MEDICARE

## 2021-05-10 ENCOUNTER — ANCILLARY PROCEDURE (OUTPATIENT)
Dept: CARDIOLOGY | Facility: CLINIC | Age: 86
End: 2021-05-10
Attending: PHYSICIAN ASSISTANT
Payer: MEDICARE

## 2021-05-10 ENCOUNTER — OFFICE VISIT (OUTPATIENT)
Dept: SURGERY | Facility: CLINIC | Age: 86
End: 2021-05-10
Payer: MEDICARE

## 2021-05-10 ENCOUNTER — PRE VISIT (OUTPATIENT)
Dept: SURGERY | Facility: CLINIC | Age: 86
End: 2021-05-10

## 2021-05-10 ENCOUNTER — ANESTHESIA EVENT (OUTPATIENT)
Dept: SURGERY | Facility: CLINIC | Age: 86
End: 2021-05-10

## 2021-05-10 VITALS
HEART RATE: 70 BPM | WEIGHT: 130 LBS | BODY MASS INDEX: 23.92 KG/M2 | TEMPERATURE: 97.6 F | SYSTOLIC BLOOD PRESSURE: 143 MMHG | HEIGHT: 62 IN | RESPIRATION RATE: 20 BRPM | DIASTOLIC BLOOD PRESSURE: 78 MMHG | OXYGEN SATURATION: 97 %

## 2021-05-10 DIAGNOSIS — Z79.01 LONG TERM CURRENT USE OF ANTICOAGULANT THERAPY: ICD-10-CM

## 2021-05-10 DIAGNOSIS — Z01.810 PRE-OPERATIVE CARDIOVASCULAR EXAMINATION: ICD-10-CM

## 2021-05-10 DIAGNOSIS — Z01.818 PRE-OP EVALUATION: Primary | ICD-10-CM

## 2021-05-10 DIAGNOSIS — R09.89 OTHER SPECIFIED SYMPTOMS AND SIGNS INVOLVING THE CIRCULATORY AND RESPIRATORY SYSTEMS: ICD-10-CM

## 2021-05-10 DIAGNOSIS — Z01.818 PRE-OP EVALUATION: ICD-10-CM

## 2021-05-10 LAB
ALBUMIN SERPL-MCNC: 3.7 G/DL (ref 3.4–5)
ALBUMIN UR-MCNC: NEGATIVE MG/DL
ALP SERPL-CCNC: 54 U/L (ref 40–150)
ALT SERPL W P-5'-P-CCNC: 24 U/L (ref 0–50)
ANION GAP SERPL CALCULATED.3IONS-SCNC: 8 MMOL/L (ref 3–14)
APPEARANCE UR: CLEAR
APTT PPP: 38 SEC (ref 22–37)
AST SERPL W P-5'-P-CCNC: 13 U/L (ref 0–45)
BILIRUB DIRECT SERPL-MCNC: 0.2 MG/DL (ref 0–0.2)
BILIRUB SERPL-MCNC: 0.7 MG/DL (ref 0.2–1.3)
BILIRUB UR QL STRIP: NEGATIVE
BUN SERPL-MCNC: 30 MG/DL (ref 7–30)
CALCIUM SERPL-MCNC: 8.9 MG/DL (ref 8.5–10.1)
CHLORIDE SERPL-SCNC: 106 MMOL/L (ref 94–109)
CO2 SERPL-SCNC: 30 MMOL/L (ref 20–32)
COLOR UR AUTO: NORMAL
CREAT SERPL-MCNC: 1.58 MG/DL (ref 0.52–1.04)
ERYTHROCYTE [DISTWIDTH] IN BLOOD BY AUTOMATED COUNT: 17.2 % (ref 10–15)
GFR SERPL CREATININE-BSD FRML MDRD: 29 ML/MIN/{1.73_M2}
GLUCOSE SERPL-MCNC: 120 MG/DL (ref 70–99)
GLUCOSE UR STRIP-MCNC: NEGATIVE MG/DL
HCT VFR BLD AUTO: 30.2 % (ref 35–47)
HGB BLD-MCNC: 10.1 G/DL (ref 11.7–15.7)
HGB UR QL STRIP: NEGATIVE
INR PPP: 1.69 (ref 0.86–1.14)
KETONES UR STRIP-MCNC: NEGATIVE MG/DL
LEUKOCYTE ESTERASE UR QL STRIP: NEGATIVE
MCH RBC QN AUTO: 31.6 PG (ref 26.5–33)
MCHC RBC AUTO-ENTMCNC: 33.4 G/DL (ref 31.5–36.5)
MCV RBC AUTO: 94 FL (ref 78–100)
NITRATE UR QL: NEGATIVE
PH UR STRIP: 5 PH (ref 5–7)
PLATELET # BLD AUTO: 154 10E9/L (ref 150–450)
POTASSIUM SERPL-SCNC: 3.6 MMOL/L (ref 3.4–5.3)
PROT SERPL-MCNC: 7.2 G/DL (ref 6.8–8.8)
RBC # BLD AUTO: 3.2 10E12/L (ref 3.8–5.2)
RBC #/AREA URNS AUTO: 0 /HPF (ref 0–2)
SODIUM SERPL-SCNC: 145 MMOL/L (ref 133–144)
SOURCE: NORMAL
SP GR UR STRIP: 1.01 (ref 1–1.03)
SQUAMOUS #/AREA URNS AUTO: 1 /HPF (ref 0–1)
UROBILINOGEN UR STRIP-MCNC: 0 MG/DL (ref 0–2)
WBC # BLD AUTO: 7.4 10E9/L (ref 4–11)
WBC #/AREA URNS AUTO: 1 /HPF (ref 0–5)

## 2021-05-10 PROCEDURE — 86900 BLOOD TYPING SEROLOGIC ABO: CPT | Performed by: PATHOLOGY

## 2021-05-10 PROCEDURE — 86850 RBC ANTIBODY SCREEN: CPT | Performed by: PATHOLOGY

## 2021-05-10 PROCEDURE — 93880 EXTRACRANIAL BILAT STUDY: CPT | Performed by: RADIOLOGY

## 2021-05-10 PROCEDURE — 80076 HEPATIC FUNCTION PANEL: CPT | Performed by: PATHOLOGY

## 2021-05-10 PROCEDURE — 93283 PRGRMG EVAL IMPLANTABLE DFB: CPT | Mod: 26 | Performed by: INTERNAL MEDICINE

## 2021-05-10 PROCEDURE — 85027 COMPLETE CBC AUTOMATED: CPT | Performed by: PATHOLOGY

## 2021-05-10 PROCEDURE — 81001 URINALYSIS AUTO W/SCOPE: CPT | Performed by: PATHOLOGY

## 2021-05-10 PROCEDURE — 86901 BLOOD TYPING SEROLOGIC RH(D): CPT | Performed by: PATHOLOGY

## 2021-05-10 PROCEDURE — 85730 THROMBOPLASTIN TIME PARTIAL: CPT | Performed by: PATHOLOGY

## 2021-05-10 PROCEDURE — 85610 PROTHROMBIN TIME: CPT | Performed by: PATHOLOGY

## 2021-05-10 PROCEDURE — 93283 PRGRMG EVAL IMPLANTABLE DFB: CPT

## 2021-05-10 PROCEDURE — 80048 BASIC METABOLIC PNL TOTAL CA: CPT | Performed by: PATHOLOGY

## 2021-05-10 PROCEDURE — 36415 COLL VENOUS BLD VENIPUNCTURE: CPT | Performed by: PATHOLOGY

## 2021-05-10 PROCEDURE — 99205 OFFICE O/P NEW HI 60 MIN: CPT | Performed by: PHYSICIAN ASSISTANT

## 2021-05-10 PROCEDURE — 71046 X-RAY EXAM CHEST 2 VIEWS: CPT | Performed by: RADIOLOGY

## 2021-05-10 SDOH — HEALTH STABILITY: MENTAL HEALTH: HOW OFTEN DO YOU HAVE 6 OR MORE DRINKS ON ONE OCCASION?: NOT ASKED

## 2021-05-10 SDOH — HEALTH STABILITY: MENTAL HEALTH: HOW OFTEN DO YOU HAVE A DRINK CONTAINING ALCOHOL?: NOT ASKED

## 2021-05-10 SDOH — HEALTH STABILITY: MENTAL HEALTH: HOW MANY STANDARD DRINKS CONTAINING ALCOHOL DO YOU HAVE ON A TYPICAL DAY?: NOT ASKED

## 2021-05-10 ASSESSMENT — ENCOUNTER SYMPTOMS: DYSRHYTHMIAS: 1

## 2021-05-10 ASSESSMENT — PAIN SCALES - GENERAL: PAINLEVEL: NO PAIN (0)

## 2021-05-10 ASSESSMENT — LIFESTYLE VARIABLES: TOBACCO_USE: 1

## 2021-05-10 ASSESSMENT — MIFFLIN-ST. JEOR: SCORE: 982.93

## 2021-05-10 NOTE — ANESTHESIA PREPROCEDURE EVALUATION
"Anesthesia Pre-Procedure Evaluation    Patient: Racquel Emmanuel   MRN: [id number] : 1935        Preoperative Diagnosis: * No surgery found *   Procedure :      Past Medical History:   Diagnosis Date     Anemia 2021     Aortic aneurysm (H) 3/24/2021     Aortic insufficiency 3/24/2021     Clinical diagnosis of COVID-19; 2021     Gastroesophageal reflux disease without esophagitis 2021     GI bleed; 2021     History of cholecystectomy 2021     History of lobectomy of lung; 2017 ([place]) 2021     History of lung cancer, right middle lobe; 2021     Hyperlipidemia LDL goal <130 2021     ICD (implantable cardioverter-defibrillator); 2021     Large B-cell lymphoma 2014 2021     Macular degeneration 2021     Nonischemic cardiomyopathy (H) 3/24/2021     MIGUELANGEL (obstructive sleep apnea) 2021     Osteopenia 2021     Paroxysmal atrial fibrillation (H) 2021     S/P AVR (22 mm bioprosthetic valve) 3/24/2021     Temporal giant cell arteritis (H) 2021      No past surgical history on file.   Allergies   Allergen Reactions     Amoxicillin      Atorvastatin      Codeine      Nsaids       Social History     Tobacco Use     Smoking status: Former Smoker     Quit date: 1990     Years since quittin.3     Smokeless tobacco: Never Used   Substance Use Topics     Alcohol use: Not on file      Wt Readings from Last 1 Encounters:   21 59.3 kg (130 lb 12.8 oz)        Anesthesia Evaluation   Pt has had prior anesthetic. Type: General and MAC (\"slow to wake\" and post op delirium ).        ROS/MED HX  ENT/Pulmonary: Comment: H/o COVID 2020- hospitalized. Cough, GI symptoms, subdural hematoma day 3 of hospitalization.  Reports still has cough that has improved, fatigue and memory isses    (+) sleep apnea (O2 2L at night), doesn't use CPAP, tobacco use, Past use,     Neurologic: Comment: Subdural hematoma, resolution on imaging 2020 " per notes. patient reports she has left arm weakness at the time, but has now recovered       Cardiovascular: Comment: CTA angiogram 4/5/2021  IMPRESSION:   1.  There is a very large saccular aneurysm of the proximal ascending   aorta. The saccular aneurysm's depth at this level is 43.2 mm and the   maximal size of the saccular aneurysm is 81.0 mm. The aneursym's neck   length is 21.5 mm.   2.  A second, smaller saccular aneurysm also emerges from the anterior   aspect of the proximal ascending aorta.   3.  Mild, non-obstructive coronary artery disease in all three major   coronary territories without high-grade stenoses.   4.  Total Agatston coronary calcium score of 2111, placing the patient   in the 96th percentile when compared to an 84-year old    female, since no normative data exist for this patient's age group.   5.  Please review Radiology report for incidental noncardiac findings   that will follow separately.       (+) Dyslipidemia hypertension-----Taking blood thinners ICD  type;[place] [employer] inogen mini Dual lead ICD Settings DDD 50 - 130 bpm dysrhythmias (paroxysmal atrial fibrillation), Previous cardiac testing   Echo: Date: 3/2021 Results:    Stress Test: Date: Results:    ECG Reviewed: Date: Results:    Cath: Date: Results:      METS/Exercise Tolerance: 3 - Able to walk 1-2 blocks without stopping Comment: Housework, can walk >1 block, ascend 1 flight of stairs, but will get SPRAGUE   Hematologic: Comments: H/o b-cell lymphoma    (+) anemia, history of blood transfusion, no previous transfusion reaction,     Musculoskeletal:  - neg musculoskeletal ROS     GI/Hepatic: Comment: H/o GI bleed in 2019    (+) GERD,     Renal/Genitourinary:     (+) renal disease, type: CRI,     Endo:  - neg endo ROS     Psychiatric/Substance Use:     (+) psychiatric history     Infectious Disease:  - neg infectious disease ROS     Malignancy:   (+) Malignancy, History of Lymphoma/Leukemia and Lung.  Lung CA  Remission status post Surgery.  Lymph CA Remission status post Chemo.        Other:            Physical Exam    Airway        Mallampati: I   TM distance: > 3 FB   Neck ROM: full   Mouth opening: > 3 cm    Respiratory Devices and Support         Dental  no notable dental history         Cardiovascular          Rhythm and rate: regular and normal   (+) murmur   (-) no peripheral edema    Pulmonary   pulmonary exam normal        breath sounds clear to auscultation           OUTSIDE LABS:  CBC:   Lab Results   Component Value Date    WBC 7.4 05/10/2021    WBC 6.5 04/05/2021    HGB 10.1 (L) 05/10/2021    HGB 9.8 (L) 04/05/2021    HCT 30.2 (L) 05/10/2021    HCT 29.2 (L) 04/05/2021     05/10/2021     (L) 04/05/2021     BMP:   Lab Results   Component Value Date     (H) 05/10/2021     04/05/2021    POTASSIUM 3.6 05/10/2021    POTASSIUM 4.1 04/05/2021    CHLORIDE 106 05/10/2021    CHLORIDE 107 04/05/2021    CO2 30 05/10/2021    CO2 27 04/05/2021    BUN 30 05/10/2021    BUN 30 04/05/2021    CR 1.58 (H) 05/10/2021    CR 1.45 (H) 04/05/2021     (H) 05/10/2021     (H) 04/05/2021     COAGS:   Lab Results   Component Value Date    PTT 38 (H) 05/10/2021    INR 1.69 (H) 05/10/2021     POC: No results found for: BGM, HCG, HCGS  HEPATIC:   Lab Results   Component Value Date    ALBUMIN 3.7 05/10/2021    PROTTOTAL 7.2 05/10/2021    ALT 24 05/10/2021    AST 13 05/10/2021    ALKPHOS 54 05/10/2021    BILITOTAL 0.7 05/10/2021     OTHER:   Lab Results   Component Value Date    CORNELIO 8.9 05/10/2021             PAC Discussion and Assessment    ASA Classification: 4  Case is suitable for: Addy  Anesthetic techniques and relevant risks discussed: GA                  PAC Resident/NP Anesthesia Assessment: Racquel Emmanuel is an 86 year old female scheduled for redo sternotomy, ascending aortic aneurysm repair on 5/6/21 by Dr. Smith and Dr. Jordan in treatment of Ascending aortic aneurysm repair.  PAC  referral for risk assessment and optimization for anesthesia with comorbid conditions of dyslipidemia, paroxysmal atrial fibrillation, cardiomyopathy, MIGUELANGEL, anemia, GERD, h/o GI bleed in 2019, h/o lung cancer s/p lobectomy, temporal cell arteritis, large B-cell lymphoma:                      Pre-operative considerations:           1.  Cardiac:  Functional status- METS 3.   ~PAF using coumadin. Per surgery team, will bridge with lovenox.    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  EF was 55-60% on ECHO 3/13571.    ~h/o aortic insufficiency s/p AVR in 2015   ~now with ascending aortic aneurysm with the above procedure planned           ~ICD in place, interrogation completed today              2.  Pulm:  Airway feasible.  MIGUELANGEL not using CPAP. She reports she is now using 2L O2 at night  ~h/o COVID 7/2020, was hospitalized and reports cough, GI symptoms and subdural hematoma day 3 of hospitalization.  Reports still has cough that has improved, fatigue and memory issues that have not resolved.   ~h/o lung cancer s/p lobectomy. Follows with oncology in [place]. New nodules were seen on imaging. She reports her oncology team has discussed this finding with the cardiothoracic team and it should not be an issue for surgery.              3.  GI:  Risk of PONV score = 3.  If > 2, anti-emetic intervention recommended.          ~GERD using Prilosec   ~h/o GI bleed in 2019, denies recurrent symptoms since that time             4. heme: anemia using ferosul. hgb was 9.8 4/5/21.       5. neuro: h/o subdural hematoma last summer. Per recent cardiology note, repeat head CT 9/2020 showed resolution of subdural hematoma.       6. onc: h/o large B cell lymphoma s/p chemotherapy in 2015.      7. Anesthesia: reports a history of being slow to wake and postop delirium     8. : CRI. Cr today 1.58 (1.4 4/5/21). Reports she follows with nephrology at [facility] every 4-5 months, and kidney function has been stable.              VTE risk: 0.5%                  Patient was also evaluated by Dr. Francis and had a long discussion about anesthesia. She has many questions about surgery, possible complications and what her care would look like if she decides not to proceed with surgery. I will send a message to the cardiothoracic team so they can have a further discussion with the patient and her family about her surgical concerns.     Patient has well documented medical history. Nothing that can be further optimized prior to surgery. As above, she has many questions she would like to address before deciding if she would like to proceed with surgery.                  **For further details of assessment, testing, and physical exam please see H and P completed on same date.                                 MIKA Miranda PA-C

## 2021-05-10 NOTE — PATIENT INSTRUCTIONS
Preparing for Your Surgery      Name:  Racquel Emmanuel   MRN:  [id number]   :  1935   Today's Date:  5/10/2021       Arriving for surgery:  Surgery date:  21  Arrival time:  5:15 am    Restrictions due to COVID 19:  One consistent visitor per patient is allowed.  The visitor will be allowed in the pre-op area.  Visitors are asked to leave the building during the surgery.  No ill visitors.  All visitors must wear face mask.    [employer] parking is available for anyone with mobility limitations or disabilities.  ([place]  24 hours/ 7 days a week; [facility]  7 am- 3:30 pm, Mon- Fri)    Please come to:     [facility] [address]  [address] [place] [address]     -    On arrival to hospital, you will be asked some screening questions and directed to Patient Registration. Patient Registration will then give you further instructions. [phone]?     - ?If you are in need of directions, wheelchair or escort please stop at the Information Desk in the lobby.  Inform the information person that you are here for surgery; a wheelchair and escort to [address] will be provided.?     What can I eat or drink?  -  You may eat and drink normally for up to 8 hours before your surgery. (Until 11:15 pm 21)  -  You may have clear liquids until 2 hours before surgery. (Until 5:15 am)    Examples of clear liquids:  Water  Clear broth  Juices (apple, white grape, white cranberry  and cider) without pulp  Noncarbonated, powder based beverages  (lemonade and Gabriel-Aid)  Sodas (Sprite, 7-Up, ginger ale and seltzer)  Coffee or tea (without milk or cream)  Gatorade    -  No Alcohol for at least 24 hours before surgery     Which medicines can I take?  * Hold Multivitamin (Ocuvite) for 10 days before surgery. Take last Multivitamin on 5-15-21 and hold until surgery.  Hold Supplements for 10 days before surgery.    Hold Warfarin (Coumadin) for 5 days prior to  surgery. Take last dose of Warfarin 5-20-21 PM dose, and hold until surgery.    Please take your last dose of coumadin on Thursday May 20th.  Have your primary care clinic check your INR on Friday May 21st in the morning and call Staci Quintanilla RN with the results.  If your INR is subtherapeutic you will be instructed to take the enoxaparin injections as follows:      1. On Saturday May 22 start your 1st dose of enoxaparin (LOVENOX) 60 mg subcutaneously in the evening.   2. On Sunday and Monday May 23rd and May 24th inject one enoxaparin (LOVENOX) shot in the in the EVENING ONLY (only once daily dosing due to kidney dysfunction).   3. On Tuesday May 25th Do NOT inject any enoxaparin (LOVENOX).       A prescription for Lovenox (enoxaparin) was sent to the [facility] pharmacy. Will have different instructions on the Rx, please instead follow the above instructions.      -  DO NOT take these medications the day of surgery:  Torsemide, Fexofenadine (Allegra), Calcitriol Ferrous Sulfate, Calcium Carbonate (TUMS),     -  PLEASE TAKE these medications the day of surgery:  Digoxin (Lanoxin), Metoprolol, Omeprazole, Paroxetine (Paxil),   Acetaminophen (Tylenol) if needed  Nasacort nasal spray if needed    How do I prepare myself?  - Please take 2 showers before surgery using Scrubcare or Hibiclens soap.    Use this soap only from the neck to your toes.     Leave the soap on your skin for one minute--then rinse thoroughly.      You may use your own shampoo and conditioner; no other hair products.   - Please remove all jewelry and body piercings.  - No lotions, deodorants or fragrance.  - No makeup or fingernail polish.   - Bring your ID and insurance card.    - All patients are required to have a Covid-19 test within 4 days of surgery/procedure.      -Patients will be contacted by the [facility] scheduling team within 1 week of surgery to make an appointment.      - Patients may call the Scheduling team at  [phone] if they have not been scheduled within 4 days of  surgery.      Questions or Concerns:    - For any questions regarding the day of surgery or your hospital stay, please contact the Pre Admission Nursing Office at [phone].       - If you have health changes between today and your surgery please call your surgeon.       For questions after surgery please call your surgeons office.     AFTER YOUR SURGERY  Breathing exercises   Breathing exercises help you recover faster. Take deep breaths and let the air out slowly. This will:     Help you wake up after surgery.    Help prevent complications like pneumonia.  Preventing complications will help you go home sooner.   We may give you a breathing device (incentive spirometer) to encourage you to breathe deeply.   Nausea and vomiting   You may feel sick to your stomach after surgery; if so, let your nurse know.    Pain control:  After surgery, you may have pain. Our goal is to help you manage your pain. Pain medicine will help you feel comfortable enough to do activities that will help you heal.  These activities may include breathing exercises, walking and physical therapy.   To help your health care team treat your pain we will ask: 1) If you have pain  2) where it is located 3) describe your pain in your words  Methods of pain control include medications given by mouth, vein or by nerve block for some surgeries.  Sequential Compression Device (SCD):  You may need to wear SCD S (also called pneumo boots)on your legs or feet. These are wraps connected to a machine that pumps in air and releases it. The repeated pumping helps prevent blood clots from forming.

## 2021-05-10 NOTE — H&P
Pre-Operative H & P     CC:  Preoperative exam to assess for increased cardiopulmonary risk while undergoing surgery and anesthesia.    Date of Encounter: 5/10/2021  Primary Care Physician:  Nina Orellana  Associated diagnosis: ascending aortic aneurysm    HPI  Racquel Emmanuel is a 86 year old female who presents for pre-operative H & P in preparation for redo sternotomy, ascending aortic aneurysm repair with Dr. Smith and Dr. Jordan on 5/26/21 at [facility]. Patient is being evaluated for comorbid conditions of dyslipidemia, paroxysmal atrial fibrillation, cardiomyopathy, MIGUELANGEL, anemia, GERD, h/o GI bleed in 2019, h/o lung cancer s/p lobectomy, temporal cell arteritis, large B-cell lymphoma      Ms. Emmanuel has a history of a large ascending aortic aneurysm. She has a history of an AVR in 2015 at an outside facility. She was seen at [facility] for her aneurysm and decided not to have surgery. She was seen by Dr. Smith for further evaluation. Per his note, surgery is high risk due to patients age and comorbid conditions, but he thinks he will be willing to offer surgery- patient wanted time to think about proceeding with surgery. She is now scheduled for the above procedure.      History is obtained from the patient and chart review.      Past Medical History  Past Medical History:   Diagnosis Date     Anemia 4/5/2021     Aortic aneurysm (H) 3/24/2021     Aortic insufficiency 3/24/2021     Clinical diagnosis of COVID-19; 7/2020 4/5/2021     Gastroesophageal reflux disease without esophagitis 4/5/2021     GI bleed; 8/2019 4/5/2021     History of cholecystectomy 4/5/2021     History of lobectomy of lung; 2/13/2017 ([facility]) 4/5/2021     History of lung cancer, right middle lobe; 2/2017 4/5/2021     Hyperlipidemia LDL goal <130 4/5/2021     ICD (implantable cardioverter-defibrillator); 2015 4/5/2021     Large B-cell lymphoma 2014 4/5/2021     Macular degeneration 4/5/2021      Nonischemic cardiomyopathy (H) 3/24/2021     MIGUELANGEL (obstructive sleep apnea) 4/5/2021     Osteopenia 4/5/2021     Paroxysmal atrial fibrillation (H) 4/5/2021     S/P AVR (22 mm bioprosthetic valve) 3/24/2021     Temporal giant cell arteritis (H) 4/5/2021       Past Surgical History  Past Surgical History:   Procedure Laterality Date     COLECTOMY PARTIAL       COLONOSCOPY       ENDOSCOPY       LOBECTOMY LUNG       REPAIR VALVE AORTIC         Hx of Blood transfusions/reactions: Patient has a history of transfusion, but denies reactions    Hx of abnormal bleeding or anti-platelet use: Coumadin- will be bridging with lovenox    Menstrual history: No LMP recorded. Patient has had a hysterectomy.    Steroid use in the last year: denies    Personal or FH with difficulty with Anesthesia:  Patient has a history of being slow to wake and of postoperative delirium, but has no family history of anesthesia complications.      Prior to Admission Medications  Current Outpatient Medications   Medication Sig Dispense Refill     acetaminophen (TYLENOL) 500 MG tablet Take 500-1,000 mg by mouth every 8 hours as needed for mild pain       calcitRIOL (ROCALTROL) 0.25 MCG capsule Take 0.25 mcg by mouth daily       digoxin (LANOXIN) 125 MCG tablet Take 125 mcg by mouth three times a week Take on Mon,  Wed, Fri       ferrous sulfate (FEROSUL) 325 (65 Fe) MG tablet Take 1 tablet by mouth twice daily on three days a week (Mon Wed and Fri only)       fexofenadine (ALLEGRA) 180 MG tablet Take 180 mg by mouth daily       losartan (COZAAR) 25 MG tablet Take 12.5 mg by mouth At Bedtime        lovastatin (MEVACOR) 40 MG tablet Take 40 mg by mouth At Bedtime        metoprolol succinate ER (TOPROL-XL) 25 MG 24 hr tablet Take 25 mg by mouth daily       multivitamin (OCUVITE) TABS tablet Take 1 tablet by mouth 2 times daily       omeprazole (PRILOSEC) 40 MG DR capsule Take 40 mg by mouth daily       PARoxetine (PAXIL) 10 MG tablet Take 10 mg by mouth  every morning        potassium chloride ER (K-TAB/KLOR-CON) 10 MEQ CR tablet Take 2 tablets by mouth daily (with dinner)        torsemide (DEMADEX) 20 MG tablet Take 20 mg by mouth daily       warfarin ANTICOAGULANT (COUMADIN) 2.5 MG tablet Take 2.5 mg on Wednesday and Take 5 mg on all other days of the week (or as directed). Takes in the evening.       calcium carbonate (TUMS) 500 MG chewable tablet Take 1 chew tab by mouth every 4 hours as needed for heartburn       [START ON 2021] enoxaparin ANTICOAGULANT (LOVENOX ANTICOAGULANT) 60 MG/0.6ML syringe 1 injection on  in evening, 2 injections  and  1 in morning & 1 in evening,1 injection  in morning. (Patient not taking: Reported on 2021) 0.6 mL 0     triamcinolone (NASACORT) 55 MCG/ACT nasal aerosol Spray 2 sprays into both nostrils daily as needed         Allergies  Allergies   Allergen Reactions     Amoxicillin      Atorvastatin      Codeine      Nsaids        Social History  Social History     Socioeconomic History     Marital status:      Spouse name: Not on file     Number of children: Not on file     Years of education: Not on file     Highest education level: Not on file   Occupational History     Not on file   Social Needs     Financial resource strain: Not on file     Food insecurity     Worry: Not on file     Inability: Not on file     Transportation needs     Medical: Not on file     Non-medical: Not on file   Tobacco Use     Smoking status: Former Smoker     Quit date: 1990     Years since quittin.3     Smokeless tobacco: Never Used   Substance and Sexual Activity     Alcohol use: Not Currently     Drug use: Not Currently     Sexual activity: Not on file   Lifestyle     Physical activity     Days per week: Not on file     Minutes per session: Not on file     Stress: Not on file   Relationships     Social connections     Talks on phone: Not on file     Gets together: Not on file     Attends Anabaptism service:  Not on file     Active member of club or organization: Not on file     Attends meetings of clubs or organizations: Not on file     Relationship status: Not on file     Intimate partner violence     Fear of current or ex partner: Not on file     Emotionally abused: Not on file     Physically abused: Not on file     Forced sexual activity: Not on file   Other Topics Concern     Not on file   Social History Narrative     Not on file       Family History  Family History   Problem Relation Age of Onset     Anesthesia Reaction No family hx of      Deep Vein Thrombosis (DVT) No family hx of        ROS/MED HX  ENT/Pulmonary: Comment: H/o COVID 7/2020- hospitalized. Cough, GI symptoms, subdural hematoma day 3 of hospitalization.  Reports still has cough that has improved, fatigue and memory isses    (+) sleep apnea (O2 2L at night), doesn't use CPAP, tobacco use, Past use,     Neurologic: Comment: Subdural hematoma, resolution on imaging 9/2020 per notes. patient reports she has left arm weakness at the time, but has now recovered       Cardiovascular: Comment: CTA angiogram 4/5/2021  IMPRESSION:   1.  There is a very large saccular aneurysm of the proximal ascending   aorta. The saccular aneurysm's depth at this level is 43.2 mm and the   maximal size of the saccular aneurysm is 81.0 mm. The aneursym's neck   length is 21.5 mm.   2.  A second, smaller saccular aneurysm also emerges from the anterior   aspect of the proximal ascending aorta.   3.  Mild, non-obstructive coronary artery disease in all three major   coronary territories without high-grade stenoses.   4.  Total Agatston coronary calcium score of 2111, placing the patient   in the 96th percentile when compared to an 84-year old    female, since no normative data exist for this patient's age group.   5.  Please review Radiology report for incidental noncardiac findings   that will follow separately.       (+) Dyslipidemia hypertension-----Taking blood  "thinners ICD dysrhythmias (paroxysmal atrial fibrillation), Previous cardiac testing   Echo: Date: 3/2021 Results:    Stress Test: Date: Results:    ECG Reviewed: Date: Results:    Cath: Date: Results:      METS/Exercise Tolerance: 3 - Able to walk 1-2 blocks without stopping Comment: Housework, can walk >1 block, ascend 1 flight of stairs, but will get SPRAGUE   Hematologic: Comments: H/o b-cell lymphoma    (+) anemia, history of blood transfusion, no previous transfusion reaction,     Musculoskeletal:  - neg musculoskeletal ROS     GI/Hepatic: Comment: H/o GI bleed in 2019    (+) GERD,     Renal/Genitourinary:     (+) renal disease, type: CRI,     Endo:  - neg endo ROS     Psychiatric/Substance Use:     (+) psychiatric history     Infectious Disease:  - neg infectious disease ROS     Malignancy:   (+) Malignancy, History of Lymphoma/Leukemia and Lung.  Lung CA Remission status post Surgery.  Lymph CA Remission status post Chemo.        Other:            The complete review of systems is negative other than noted in the HPI or here.   Temp: 97.6  F (36.4  C) Temp src: Oral BP: (!) 143/78 Pulse: 70   Resp: 20 SpO2: 97 %         130 lbs 0 oz  5' 2\"[Pt reported[   Body mass index is 23.78 kg/m .       Physical Exam  Constitutional: Awake, alert, cooperative, no apparent distress, and appears stated age.  Eyes: Pupils equal, round and reactive to light, extra ocular muscles intact, sclera clear, conjunctiva normal.  HENT: Normocephalic, oral pharynx with moist mucus membranes, good dentition.  Respiratory: Clear to auscultation bilaterally, no crackles or wheezing.  Cardiovascular: Regular rate and rhythm, systolic murmur.  Carotids no bruits. No edema. Palpable pulses to radial arteries.   GI: Normal bowel sounds, soft, non-distended, non-tender  Genitourinary:  deferred  Skin: Warm and dry.    Musculoskeletal: Full ROM of neck. There is no redness, warmth, or swelling of the exposed joints. Gross motor strength is " normal.    Neurologic: Awake, alert, oriented to name, place and time. Cranial nerves II-XII are grossly intact.   Neuropsychiatric: Calm, cooperative. Normal affect.     Labs: (personally reviewed)  Component      Latest Ref Rng & Units 5/10/2021   Sodium      133 - 144 mmol/L 145 (H)   Potassium      3.4 - 5.3 mmol/L 3.6   Chloride      94 - 109 mmol/L 106   Carbon Dioxide      20 - 32 mmol/L 30   Anion Gap      3 - 14 mmol/L 8   Glucose      70 - 99 mg/dL 120 (H)   Urea Nitrogen      7 - 30 mg/dL 30   Creatinine      0.52 - 1.04 mg/dL 1.58 (H)   GFR Estimate      >60 mL/min/1.73:m2 29 (L)   GFR Estimate If Black      >60 mL/min/1.73:m2 34 (L)   Calcium      8.5 - 10.1 mg/dL 8.9   WBC      4.0 - 11.0 10e9/L 7.4   RBC Count      3.8 - 5.2 10e12/L 3.20 (L)   Hemoglobin      11.7 - 15.7 g/dL 10.1 (L)   Hematocrit      35.0 - 47.0 % 30.2 (L)   MCV      78 - 100 fl 94   MCH      26.5 - 33.0 pg 31.6   MCHC      31.5 - 36.5 g/dL 33.4   RDW      10.0 - 15.0 % 17.2 (H)   Platelet Count      150 - 450 10e9/L 154   Bilirubin Direct      0.0 - 0.2 mg/dL 0.2   Bilirubin Total      0.2 - 1.3 mg/dL 0.7   Albumin      3.4 - 5.0 g/dL 3.7   Protein Total      6.8 - 8.8 g/dL 7.2   Alkaline Phosphatase      40 - 150 U/L 54   ALT      0 - 50 U/L 24   AST      0 - 45 U/L 13   PTT      22 - 37 sec 38 (H)   INR      0.86 - 1.14 1.69 (H)       CTA angiogram 4/5/21   IMPRESSION:   1.  There is a very large saccular aneurysm of the proximal ascending   aorta. The saccular aneurysm's depth at this level is 43.2 mm and the   maximal size of the saccular aneurysm is 81.0 mm. The aneursym's neck   length is 21.5 mm.   2.  A second, smaller saccular aneurysm also emerges from the anterior   aspect of the proximal ascending aorta.   3.  Mild, non-obstructive coronary artery disease in all three major   coronary territories without high-grade stenoses.   4.  Total Agatston coronary calcium score of 2111, placing the patient   in the 96th  percentile when compared to an 84-year old    female, since no normative data exist for this patient's age group.   5.  Please review Radiology report for incidental noncardiac findings   that will follow separately.       ASSESSMENT and PLAN  Racquel Emmanuel is an 86 year old female scheduled for redo sternotomy, ascending aortic aneurysm repair on 5/6/21 by Dr. Smith and Dr. Jordan in treatment of Ascending aortic aneurysm repair.  PAC referral for risk assessment and optimization for anesthesia with comorbid conditions of dyslipidemia, paroxysmal atrial fibrillation, cardiomyopathy, MIGUELANGEL, anemia, GERD, h/o GI bleed in 2019, h/o lung cancer s/p lobectomy, temporal cell arteritis, large B-cell lymphoma:                      Pre-operative considerations:           1.  Cardiac:  Functional status- METS 3.   ~PAF using coumadin. Per surgery team, will bridge with lovenox.    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  EF was 55-60% on ECHO 3/76281.    ~h/o aortic insufficiency s/p AVR in 2015   ~now with ascending aortic aneurysm with the above procedure planned           ~ICD in place, interrogation completed today              2.  Pulm:  Airway feasible.  MIGUELANGEL not using CPAP. She reports she is now using 2L O2 at night  ~h/o COVID 7/2020, was hospitalized and reports cough, GI symptoms and subdural hematoma day 3 of hospitalization.  Reports still has cough that has improved, fatigue and memory issues that have not resolved.   ~h/o lung cancer s/p lobectomy. Follows with oncology in [place]. New nodules were seen on imaging. She reports her oncology team has discussed this finding with the cardiothoracic team and it should not be an issue for surgery.              3.  GI:  Risk of PONV score = 3.  If > 2, anti-emetic intervention recommended.          ~GERD using Prilosec   ~h/o GI bleed in 2019, denies recurrent symptoms since that time             4. heme: anemia using ferosul. hgb was 9.8 4/5/21.       5.  neuro: h/o subdural hematoma last summer. Per recent cardiology note, repeat head CT 9/2020 showed resolution of subdural hematoma.       6. onc: h/o large B cell lymphoma s/p chemotherapy in 2015.      7. Anesthesia: reports a history of being slow to wake and postop delirium     8. : CRI. Cr today 1.58 (1.4 4/5/21). Reports she follows with nephrology at [facility] every 4-5 months, and kidney function has been stable.              VTE risk: 0.5%                 Patient was also evaluated by Dr. Francis and had a long discussion about anesthesia. She has many questions about surgery, possible complications and what her care would look like if she decides not to proceed with surgery. I will send a message to the cardiothoracic team so they can have a further discussion with the patient and her family about her surgical concerns.     Patient has well documented medical history. Nothing that can be further optimized prior to surgery. As above, she has many questions she would like to address before deciding if she would like to proceed with surgery.         60 minutes were spent completing chart review, seeing the patient, reviewing labs and test results, discussing patient care with anesthesia and completing documentation       Fernanda Givens PA-C  Preoperative Assessment Center  [facility]  Phone: [phone]  Fax: [phone]

## 2021-05-11 ENCOUNTER — CARE COORDINATION (OUTPATIENT)
Dept: CARDIOLOGY | Facility: CLINIC | Age: 86
End: 2021-05-11

## 2021-05-11 LAB
ABO + RH BLD: NORMAL
ABO + RH BLD: NORMAL
BLD GP AB SCN SERPL QL: NORMAL
BLOOD BANK CMNT PATIENT-IMP: NORMAL
BLOOD BANK CMNT PATIENT-IMP: NORMAL
SPECIMEN EXP DATE BLD: NORMAL

## 2021-05-11 NOTE — PROGRESS NOTES
Received call from patient's daughter and from PAC clinic that the patient and her daughter have questions that they would like to speak to Dr Smith about before the end of this week as they are still wondering about going forward with the aneurysm repair.  Will discuss a time with Dr Smith and call daughter Dona back with date and time.     Spoke to Dr Smith and he will call patient tomorrow in between TAVRs.  Called and spoke to Dona the patient's daughter and she will expect call and then get her mother on conference call so they can get all their questions answered.

## 2021-05-12 DIAGNOSIS — Z11.59 ENCOUNTER FOR SCREENING FOR OTHER VIRAL DISEASES: ICD-10-CM

## 2021-05-12 LAB — INTERPRETATION ECG - MUSE: NORMAL

## 2021-05-14 LAB
MDC_IDC_LEAD_IMPLANT_DT: NORMAL
MDC_IDC_LEAD_IMPLANT_DT: NORMAL
MDC_IDC_LEAD_LOCATION: NORMAL
MDC_IDC_LEAD_LOCATION: NORMAL
MDC_IDC_LEAD_LOCATION_DETAIL_1: NORMAL
MDC_IDC_LEAD_LOCATION_DETAIL_1: NORMAL
MDC_IDC_LEAD_MFG: NORMAL
MDC_IDC_LEAD_MFG: NORMAL
MDC_IDC_LEAD_MODEL: NORMAL
MDC_IDC_LEAD_MODEL: NORMAL
MDC_IDC_LEAD_POLARITY_TYPE: NORMAL
MDC_IDC_LEAD_POLARITY_TYPE: NORMAL
MDC_IDC_LEAD_SERIAL: NORMAL
MDC_IDC_LEAD_SERIAL: NORMAL
MDC_IDC_MSMT_BATTERY_DTM: NORMAL
MDC_IDC_MSMT_BATTERY_REMAINING_LONGEVITY: 12 MO
MDC_IDC_MSMT_BATTERY_STATUS: NORMAL
MDC_IDC_MSMT_CAP_CHARGE_DTM: NORMAL
MDC_IDC_MSMT_CAP_CHARGE_ENERGY: 0 J
MDC_IDC_MSMT_CAP_CHARGE_TIME: 0 S
MDC_IDC_MSMT_CAP_CHARGE_TIME: 13.29
MDC_IDC_MSMT_CAP_CHARGE_TYPE: NORMAL
MDC_IDC_MSMT_CAP_CHARGE_TYPE: NORMAL
MDC_IDC_MSMT_LEADCHNL_RA_IMPEDANCE_VALUE: 480 OHM
MDC_IDC_MSMT_LEADCHNL_RA_PACING_THRESHOLD_AMPLITUDE: 1.4 V
MDC_IDC_MSMT_LEADCHNL_RA_PACING_THRESHOLD_PULSEWIDTH: 0.4 MS
MDC_IDC_MSMT_LEADCHNL_RA_SENSING_INTR_AMPL: 3.3 MV
MDC_IDC_MSMT_LEADCHNL_RV_IMPEDANCE_VALUE: 698 OHM
MDC_IDC_MSMT_LEADCHNL_RV_PACING_THRESHOLD_AMPLITUDE: 0.8 V
MDC_IDC_MSMT_LEADCHNL_RV_PACING_THRESHOLD_PULSEWIDTH: 0.4 MS
MDC_IDC_MSMT_LEADCHNL_RV_SENSING_INTR_AMPL: 25 MV
MDC_IDC_PG_IMPLANT_DTM: NORMAL
MDC_IDC_PG_MFG: NORMAL
MDC_IDC_PG_MODEL: NORMAL
MDC_IDC_PG_SERIAL: NORMAL
MDC_IDC_PG_TYPE: NORMAL
MDC_IDC_SESS_CLINIC_NAME: NORMAL
MDC_IDC_SESS_DTM: NORMAL
MDC_IDC_SESS_TYPE: NORMAL
MDC_IDC_SET_BRADY_AT_MODE_SWITCH_MODE: NORMAL
MDC_IDC_SET_BRADY_AT_MODE_SWITCH_RATE: 170 {BEATS}/MIN
MDC_IDC_SET_BRADY_HYSTRATE: NORMAL
MDC_IDC_SET_BRADY_LOWRATE: 50 {BEATS}/MIN
MDC_IDC_SET_BRADY_MAX_SENSOR_RATE: 130 {BEATS}/MIN
MDC_IDC_SET_BRADY_MAX_TRACKING_RATE: 130 {BEATS}/MIN
MDC_IDC_SET_BRADY_MODE: NORMAL
MDC_IDC_SET_BRADY_PAV_DELAY_HIGH: 220 MS
MDC_IDC_SET_BRADY_PAV_DELAY_LOW: 300 MS
MDC_IDC_SET_BRADY_SAV_DELAY_HIGH: 220 MS
MDC_IDC_SET_BRADY_SAV_DELAY_LOW: 300 MS
MDC_IDC_SET_LEADCHNL_RA_PACING_AMPLITUDE: 2.8 V
MDC_IDC_SET_LEADCHNL_RA_PACING_CAPTURE_MODE: NORMAL
MDC_IDC_SET_LEADCHNL_RA_PACING_POLARITY: NORMAL
MDC_IDC_SET_LEADCHNL_RA_PACING_PULSEWIDTH: 0.4 MS
MDC_IDC_SET_LEADCHNL_RA_SENSING_ADAPTATION_MODE: NORMAL
MDC_IDC_SET_LEADCHNL_RA_SENSING_POLARITY: NORMAL
MDC_IDC_SET_LEADCHNL_RA_SENSING_SENSITIVITY: 0.25 MV
MDC_IDC_SET_LEADCHNL_RV_PACING_AMPLITUDE: 2 V
MDC_IDC_SET_LEADCHNL_RV_PACING_CAPTURE_MODE: NORMAL
MDC_IDC_SET_LEADCHNL_RV_PACING_POLARITY: NORMAL
MDC_IDC_SET_LEADCHNL_RV_PACING_PULSEWIDTH: 0.4 MS
MDC_IDC_SET_LEADCHNL_RV_SENSING_ADAPTATION_MODE: NORMAL
MDC_IDC_SET_LEADCHNL_RV_SENSING_POLARITY: NORMAL
MDC_IDC_SET_LEADCHNL_RV_SENSING_SENSITIVITY: 0.6 MV
MDC_IDC_SET_ZONE_DETECTION_INTERVAL: 286 MS
MDC_IDC_SET_ZONE_DETECTION_INTERVAL: 333 MS
MDC_IDC_SET_ZONE_TYPE: NORMAL
MDC_IDC_SET_ZONE_VENDOR_TYPE: NORMAL
MDC_IDC_STAT_BRADY_DTM_END: NORMAL
MDC_IDC_STAT_BRADY_DTM_START: NORMAL
MDC_IDC_STAT_BRADY_RA_PERCENT_PACED: 1 %
MDC_IDC_STAT_BRADY_RV_PERCENT_PACED: 1 %
MDC_IDC_STAT_EPISODE_RECENT_COUNT: 0
MDC_IDC_STAT_EPISODE_RECENT_COUNT_DTM_END: NORMAL
MDC_IDC_STAT_EPISODE_RECENT_COUNT_DTM_START: NORMAL
MDC_IDC_STAT_EPISODE_TOTAL_COUNT: 0
MDC_IDC_STAT_EPISODE_TOTAL_COUNT: 33
MDC_IDC_STAT_EPISODE_TOTAL_COUNT: 4
MDC_IDC_STAT_EPISODE_TOTAL_COUNT_DTM_END: NORMAL
MDC_IDC_STAT_EPISODE_TYPE: NORMAL
MDC_IDC_STAT_EPISODE_VENDOR_TYPE: NORMAL
MDC_IDC_STAT_TACHYTHERAPY_ATP_DELIVERED_RECENT: 0
MDC_IDC_STAT_TACHYTHERAPY_ATP_DELIVERED_TOTAL: 3
MDC_IDC_STAT_TACHYTHERAPY_RECENT_DTM_END: NORMAL
MDC_IDC_STAT_TACHYTHERAPY_RECENT_DTM_START: NORMAL
MDC_IDC_STAT_TACHYTHERAPY_SHOCKS_ABORTED_RECENT: 0
MDC_IDC_STAT_TACHYTHERAPY_SHOCKS_ABORTED_TOTAL: 1
MDC_IDC_STAT_TACHYTHERAPY_SHOCKS_DELIVERED_RECENT: 0
MDC_IDC_STAT_TACHYTHERAPY_SHOCKS_DELIVERED_TOTAL: 0
MDC_IDC_STAT_TACHYTHERAPY_TOTAL_DTM_END: NORMAL

## 2021-05-18 ENCOUNTER — CARE COORDINATION (OUTPATIENT)
Dept: CARDIOLOGY | Facility: CLINIC | Age: 86
End: 2021-05-18

## 2021-05-18 DIAGNOSIS — I71.9 AORTIC ANEURYSM (H): Primary | ICD-10-CM

## 2021-05-18 DIAGNOSIS — I51.89 OTHER ILL-DEFINED HEART DISEASES: ICD-10-CM

## 2021-05-18 NOTE — PROGRESS NOTES
Per Dr Smith, patient needs a CORS angiogram and CT pelvis to assess the ileofemoral arteries for bypass cannulation.  Patient surgery is currently scheduled for 05/26.

## 2021-05-18 NOTE — LETTER
May 20, 2021        Dear Racquel,      Here are the instructions and preparation for your angiogram.  Please share this with Dona so she can help you prepare.    You are scheduled for a coronary angiogram on Thursday June 3rd at 12:30 pm at the [facility], [address] [place] [address],   [place] [address]. There is  parking in front of the hospital.  Please check in at the [facility] Waiting Room down the cardozo from the hospital lobby.      An angiogram is a test to find out if you have coronary artery disease. Contrast dye is injected into the arteries of the heart through a tiny catheter.  This allows the doctor to see on a X-ray screen where, or if, an artery is narrowed or blocked.      Pre-procedure instructions:  1. Please make arrangements to have a , your will be given mild sedation and will not be allowed to drive for 24 hours.   2. Do not eat or drink 6 hours prior to your procedure.  Sips of water are allowed up to 2 hours prior to the procedure.  3. Take your usual morning medications with sips of water as you normally would.   4. You will need to use Lovenox (enoxaparin) while you are off your coumadin for this test.      a. Take your last dose of coumadin on Friday May 28th.      b. Start Lovenox (enoxaparin) on Arsen May 30th in the morning and you will need to                      continue the Lovenox daily until the day before surgery.        c.Take your last dose of Lovenox the morning of Monday June 14th.       d. You do not need to have your INR checked while you are on Lovenox.    5. Please note that you will be discharged several hours after the procedure. This is an outpatient procedure.               If your angiogram is in preparation for another heart surgery, and you are found to have blockages in your arteries your surgeon will most likely bypass these blockages at the same time he performs your other open heart surgery.                The risks  associated with this procedure depends on the health of the patient.  Inserting and moving the catheter through the artery may cause:   Bleeding or bruising, blood clots, injury to the artery or vein, a very small risk of stroke and a very small chance of a heart attack.      Please feel free to call me with any questions or concerns.          Staci Quintanilla RN  [facility]  Cardiothoracic Surgery Care Coordinator  [phone]

## 2021-05-19 DIAGNOSIS — Z11.59 ENCOUNTER FOR SCREENING FOR OTHER VIRAL DISEASES: ICD-10-CM

## 2021-05-19 PROBLEM — I51.89 OTHER ILL-DEFINED HEART DISEASES: Status: ACTIVE | Noted: 2021-05-19

## 2021-05-19 RX ORDER — LIDOCAINE 40 MG/G
CREAM TOPICAL
Status: CANCELLED | OUTPATIENT
Start: 2021-05-19

## 2021-05-19 RX ORDER — SODIUM CHLORIDE 9 MG/ML
INJECTION, SOLUTION INTRAVENOUS CONTINUOUS
Status: CANCELLED | OUTPATIENT
Start: 2021-05-19

## 2021-05-19 RX ORDER — POTASSIUM CHLORIDE 1500 MG/1
20 TABLET, EXTENDED RELEASE ORAL
Status: CANCELLED | OUTPATIENT
Start: 2021-05-19

## 2021-05-19 RX ORDER — POTASSIUM CHLORIDE 1500 MG/1
40 TABLET, EXTENDED RELEASE ORAL
Status: CANCELLED | OUTPATIENT
Start: 2021-05-19

## 2021-05-19 NOTE — PROGRESS NOTES
Called patient and discussed getting CT on 05/21 and angiogram on 06/03 and surgery will be rescheduled to 06/15.  Patient will discuss these dates and time with her daughter and call this nurse back.     Patient's daughter called back and discussed dates of procedures and new surgery date.  Daughter agreed to new schedule and plan and will call with any other questions or concerns.     Will call patient with CT prep and mail prep for the angiogram to patient.     Called and spoke to PAC pharmacist Reagan Franklin to plan for coumadin bridging for the angiogram and for surgery.  Patient will take last dose of coumadin on May 28th and begin Lovenox one time daily in the evening on 05/30, 05/31, 06/01 then stop for the angiogram on 06/03.  After the angiogram she will resume the coumadin on Friday June 4th in the evening and continue until June 13th for the last dose of lovenox prior to surgery on 06/15.

## 2021-05-21 ENCOUNTER — ANCILLARY PROCEDURE (OUTPATIENT)
Dept: CT IMAGING | Facility: CLINIC | Age: 86
End: 2021-05-21
Attending: SURGERY
Payer: MEDICARE

## 2021-05-21 DIAGNOSIS — I71.9 AORTIC ANEURYSM (H): ICD-10-CM

## 2021-05-21 LAB
CREAT BLD-MCNC: 1.6 MG/DL (ref 0.52–1.04)
GFR SERPL CREATININE-BSD FRML MDRD: 31 ML/MIN/{1.73_M2}

## 2021-05-21 PROCEDURE — 36415 COLL VENOUS BLD VENIPUNCTURE: CPT | Performed by: PATHOLOGY

## 2021-05-21 PROCEDURE — 82565 ASSAY OF CREATININE: CPT | Performed by: PATHOLOGY

## 2021-05-21 PROCEDURE — G1004 CDSM NDSC: HCPCS | Mod: GC | Performed by: RADIOLOGY

## 2021-05-21 PROCEDURE — 71275 CT ANGIOGRAPHY CHEST: CPT | Mod: ME | Performed by: RADIOLOGY

## 2021-05-21 PROCEDURE — 74174 CTA ABD&PLVS W/CONTRAST: CPT | Mod: ME | Performed by: RADIOLOGY

## 2021-05-21 RX ORDER — IOPAMIDOL 755 MG/ML
120 INJECTION, SOLUTION INTRAVASCULAR ONCE
Status: COMPLETED | OUTPATIENT
Start: 2021-05-21 | End: 2021-05-21

## 2021-05-21 RX ADMIN — IOPAMIDOL 120 ML: 755 INJECTION, SOLUTION INTRAVASCULAR at 15:45

## 2021-05-26 ENCOUNTER — CARE COORDINATION (OUTPATIENT)
Dept: CARDIOLOGY | Facility: CLINIC | Age: 86
End: 2021-05-26

## 2021-05-26 DIAGNOSIS — Z79.01 LONG TERM CURRENT USE OF ANTICOAGULANT THERAPY: ICD-10-CM

## 2021-05-26 DIAGNOSIS — Z79.01 LONG TERM CURRENT USE OF ANTICOAGULANT THERAPY: Primary | ICD-10-CM

## 2021-05-26 NOTE — PROGRESS NOTES
Called patient and left voicemail message to discuss new Lovenox bridging for addition of angiogram scheduled for 06/03 and new surgery date of 06/15.    Note dated 05/18/21-Called and spoke to PAC pharmacist Reagan Franklin to plan for coumadin bridging for the angiogram and for surgery.  Patient will take last dose of coumadin on May 28th and begin Lovenox one time daily in the evening on 05/30, 05/31, 06/01 then stop for the angiogram on 06/03.  After the angiogram she will resume the coumadin on Friday June 4th in the evening and continue until June 13th for the last dose of lovenox prior to surgery on 06/15. These instructions were set to patient in a letter.     Patient returned call and was instructed a new prescription will be sent to her pharmacy in [place] for an additional 7 syringes of Lovenox.  Patient verbalized already having 6 syringes from prior to change in surgery date.  This would be enough to bridge her for the angiogram and surgery.  Reviewed administration instructions with her and she will share the letter with her daughter Dona so she can assist the patient with correct administration of medication.  Also spoke to patient's daughter Dona to review angiogram pre and post care.  All questions answered and patient and/or daughter will call with any further concerns.

## 2021-06-02 ENCOUNTER — TELEPHONE (OUTPATIENT)
Dept: CARDIOLOGY | Facility: CLINIC | Age: 86
End: 2021-06-02

## 2021-06-02 NOTE — TELEPHONE ENCOUNTER
Call complete for pre procedure reminder, travel screen and updated visitor policy.  Patient states she was never told she needed a Covid test prior to the angiogram, and has not had one completed yet.  She plans to drive up to the [place] from [place] in the morning prior to her procedure; Arlette with Cath Lab Scheduling called to assist with scheduling Covid test tomorrow morning prior to her procedure.  Reviewed pre-procedure instructions with patient, and she verbalizes understanding.

## 2021-06-03 ENCOUNTER — APPOINTMENT (OUTPATIENT)
Dept: LAB | Facility: CLINIC | Age: 86
End: 2021-06-03
Attending: INTERNAL MEDICINE
Payer: MEDICARE

## 2021-06-03 ENCOUNTER — APPOINTMENT (OUTPATIENT)
Dept: MEDSURG UNIT | Facility: CLINIC | Age: 86
End: 2021-06-03
Payer: MEDICARE

## 2021-06-03 ENCOUNTER — HOSPITAL ENCOUNTER (OUTPATIENT)
Facility: CLINIC | Age: 86
Discharge: HOME | End: 2021-06-03
Attending: INTERNAL MEDICINE | Admitting: INTERNAL MEDICINE
Payer: MEDICARE

## 2021-06-03 VITALS
HEART RATE: 62 BPM | TEMPERATURE: 98.1 F | RESPIRATION RATE: 16 BRPM | BODY MASS INDEX: 23.92 KG/M2 | OXYGEN SATURATION: 94 % | DIASTOLIC BLOOD PRESSURE: 60 MMHG | SYSTOLIC BLOOD PRESSURE: 130 MMHG | HEIGHT: 62 IN | WEIGHT: 130 LBS

## 2021-06-03 DIAGNOSIS — I51.89 OTHER ILL-DEFINED HEART DISEASES: ICD-10-CM

## 2021-06-03 DIAGNOSIS — I71.9 AORTIC ANEURYSM (H): ICD-10-CM

## 2021-06-03 DIAGNOSIS — I71.21 ASCENDING AORTIC ANEURYSM (H): Primary | ICD-10-CM

## 2021-06-03 DIAGNOSIS — Z11.59 ENCOUNTER FOR SCREENING FOR OTHER VIRAL DISEASES: ICD-10-CM

## 2021-06-03 PROBLEM — Z98.890 STATUS POST CORONARY ANGIOGRAM: Status: ACTIVE | Noted: 2021-06-03

## 2021-06-03 LAB
ANION GAP SERPL CALCULATED.3IONS-SCNC: 4 MMOL/L (ref 3–14)
APTT PPP: 33 SEC (ref 22–37)
BUN SERPL-MCNC: 33 MG/DL (ref 7–30)
CALCIUM SERPL-MCNC: 9.1 MG/DL (ref 8.5–10.1)
CHLORIDE SERPL-SCNC: 108 MMOL/L (ref 94–109)
CO2 SERPL-SCNC: 28 MMOL/L (ref 20–32)
CREAT SERPL-MCNC: 1.46 MG/DL (ref 0.52–1.04)
ERYTHROCYTE [DISTWIDTH] IN BLOOD BY AUTOMATED COUNT: 17.2 % (ref 10–15)
GFR SERPL CREATININE-BSD FRML MDRD: 32 ML/MIN/{1.73_M2}
GLUCOSE SERPL-MCNC: 102 MG/DL (ref 70–99)
HCT VFR BLD AUTO: 29.5 % (ref 35–47)
HGB BLD-MCNC: 10 G/DL (ref 11.7–15.7)
INR PPP: 1.09 (ref 0.86–1.14)
LABORATORY COMMENT REPORT: NORMAL
MCH RBC QN AUTO: 32.3 PG (ref 26.5–33)
MCHC RBC AUTO-ENTMCNC: 33.9 G/DL (ref 31.5–36.5)
MCV RBC AUTO: 95 FL (ref 78–100)
PLATELET # BLD AUTO: 150 10E9/L (ref 150–450)
POTASSIUM SERPL-SCNC: 4.6 MMOL/L (ref 3.4–5.3)
RBC # BLD AUTO: 3.1 10E12/L (ref 3.8–5.2)
SARS-COV-2 RNA RESP QL NAA+PROBE: NEGATIVE
SARS-COV-2 RNA RESP QL NAA+PROBE: NORMAL
SODIUM SERPL-SCNC: 140 MMOL/L (ref 133–144)
SPECIMEN SOURCE: NORMAL
SPECIMEN SOURCE: NORMAL
WBC # BLD AUTO: 7.2 10E9/L (ref 4–11)

## 2021-06-03 PROCEDURE — C1894 INTRO/SHEATH, NON-LASER: HCPCS | Performed by: INTERNAL MEDICINE

## 2021-06-03 PROCEDURE — 250N000009 HC RX 250: Performed by: INTERNAL MEDICINE

## 2021-06-03 PROCEDURE — 36415 COLL VENOUS BLD VENIPUNCTURE: CPT | Performed by: SURGERY

## 2021-06-03 PROCEDURE — 250N000011 HC RX IP 250 OP 636: Performed by: INTERNAL MEDICINE

## 2021-06-03 PROCEDURE — U0003 INFECTIOUS AGENT DETECTION BY NUCLEIC ACID (DNA OR RNA); SEVERE ACUTE RESPIRATORY SYNDROME CORONAVIRUS 2 (SARS-COV-2) (CORONAVIRUS DISEASE [COVID-19]), AMPLIFIED PROBE TECHNIQUE, MAKING USE OF HIGH THROUGHPUT TECHNOLOGIES AS DESCRIBED BY CMS-2020-01-R: HCPCS | Performed by: INTERNAL MEDICINE

## 2021-06-03 PROCEDURE — U0005 INFEC AGEN DETEC AMPLI PROBE: HCPCS | Performed by: INTERNAL MEDICINE

## 2021-06-03 PROCEDURE — 85730 THROMBOPLASTIN TIME PARTIAL: CPT | Mod: GZ | Performed by: SURGERY

## 2021-06-03 PROCEDURE — 272N000001 HC OR GENERAL SUPPLY STERILE: Performed by: INTERNAL MEDICINE

## 2021-06-03 PROCEDURE — 85610 PROTHROMBIN TIME: CPT | Performed by: SURGERY

## 2021-06-03 PROCEDURE — 250N000009 HC RX 250: Performed by: SURGERY

## 2021-06-03 PROCEDURE — 999N000142 HC STATISTIC PROCEDURE PREP ONLY

## 2021-06-03 PROCEDURE — 93010 ELECTROCARDIOGRAM REPORT: CPT | Performed by: INTERNAL MEDICINE

## 2021-06-03 PROCEDURE — 999N000054 HC STATISTIC EKG NON-CHARGEABLE

## 2021-06-03 PROCEDURE — 80048 BASIC METABOLIC PNL TOTAL CA: CPT | Performed by: SURGERY

## 2021-06-03 PROCEDURE — 93454 CORONARY ARTERY ANGIO S&I: CPT | Performed by: INTERNAL MEDICINE

## 2021-06-03 PROCEDURE — 258N000003 HC RX IP 258 OP 636: Performed by: SURGERY

## 2021-06-03 PROCEDURE — 99152 MOD SED SAME PHYS/QHP 5/>YRS: CPT | Performed by: INTERNAL MEDICINE

## 2021-06-03 PROCEDURE — C1769 GUIDE WIRE: HCPCS | Performed by: INTERNAL MEDICINE

## 2021-06-03 PROCEDURE — C1887 CATHETER, GUIDING: HCPCS | Performed by: INTERNAL MEDICINE

## 2021-06-03 PROCEDURE — 85027 COMPLETE CBC AUTOMATED: CPT | Performed by: SURGERY

## 2021-06-03 PROCEDURE — 99153 MOD SED SAME PHYS/QHP EA: CPT | Performed by: INTERNAL MEDICINE

## 2021-06-03 RX ORDER — EPTIFIBATIDE 2 MG/ML
1 INJECTION, SOLUTION INTRAVENOUS CONTINUOUS PRN
Status: DISCONTINUED | OUTPATIENT
Start: 2021-06-03 | End: 2021-06-03 | Stop reason: HOSPADM

## 2021-06-03 RX ORDER — NITROGLYCERIN 5 MG/ML
VIAL (ML) INTRAVENOUS
Status: DISCONTINUED | OUTPATIENT
Start: 2021-06-03 | End: 2021-06-03 | Stop reason: HOSPADM

## 2021-06-03 RX ORDER — DOBUTAMINE HYDROCHLORIDE 200 MG/100ML
2-20 INJECTION INTRAVENOUS CONTINUOUS PRN
Status: DISCONTINUED | OUTPATIENT
Start: 2021-06-03 | End: 2021-06-03 | Stop reason: HOSPADM

## 2021-06-03 RX ORDER — EPTIFIBATIDE 2 MG/ML
180 INJECTION, SOLUTION INTRAVENOUS EVERY 10 MIN PRN
Status: DISCONTINUED | OUTPATIENT
Start: 2021-06-03 | End: 2021-06-03 | Stop reason: HOSPADM

## 2021-06-03 RX ORDER — ATROPINE SULFATE 0.1 MG/ML
0.5 INJECTION INTRAVENOUS
Status: DISCONTINUED | OUTPATIENT
Start: 2021-06-03 | End: 2021-06-04 | Stop reason: HOSPADM

## 2021-06-03 RX ORDER — NALOXONE HYDROCHLORIDE 0.4 MG/ML
0.4 INJECTION, SOLUTION INTRAMUSCULAR; INTRAVENOUS; SUBCUTANEOUS
Status: DISCONTINUED | OUTPATIENT
Start: 2021-06-03 | End: 2021-06-04 | Stop reason: HOSPADM

## 2021-06-03 RX ORDER — ARGATROBAN 1 MG/ML
350 INJECTION, SOLUTION INTRAVENOUS
Status: DISCONTINUED | OUTPATIENT
Start: 2021-06-03 | End: 2021-06-03 | Stop reason: HOSPADM

## 2021-06-03 RX ORDER — TIROFIBAN HYDROCHLORIDE 50 UG/ML
0.07 INJECTION INTRAVENOUS CONTINUOUS PRN
Status: DISCONTINUED | OUTPATIENT
Start: 2021-06-03 | End: 2021-06-03 | Stop reason: HOSPADM

## 2021-06-03 RX ORDER — IOPAMIDOL 755 MG/ML
INJECTION, SOLUTION INTRAVASCULAR
Status: DISCONTINUED | OUTPATIENT
Start: 2021-06-03 | End: 2021-06-03 | Stop reason: HOSPADM

## 2021-06-03 RX ORDER — LIDOCAINE 40 MG/G
CREAM TOPICAL
Status: DISCONTINUED | OUTPATIENT
Start: 2021-06-03 | End: 2021-06-03 | Stop reason: HOSPADM

## 2021-06-03 RX ORDER — HEPARIN SODIUM 10000 [USP'U]/100ML
100-1000 INJECTION, SOLUTION INTRAVENOUS CONTINUOUS PRN
Status: DISCONTINUED | OUTPATIENT
Start: 2021-06-03 | End: 2021-06-03 | Stop reason: HOSPADM

## 2021-06-03 RX ORDER — FENTANYL CITRATE 50 UG/ML
INJECTION, SOLUTION INTRAMUSCULAR; INTRAVENOUS
Status: DISCONTINUED | OUTPATIENT
Start: 2021-06-03 | End: 2021-06-03 | Stop reason: HOSPADM

## 2021-06-03 RX ORDER — POTASSIUM CHLORIDE 750 MG/1
40 TABLET, EXTENDED RELEASE ORAL
Status: DISCONTINUED | OUTPATIENT
Start: 2021-06-03 | End: 2021-06-03 | Stop reason: HOSPADM

## 2021-06-03 RX ORDER — HEPARIN SODIUM 1000 [USP'U]/ML
INJECTION, SOLUTION INTRAVENOUS; SUBCUTANEOUS
Status: DISCONTINUED | OUTPATIENT
Start: 2021-06-03 | End: 2021-06-03 | Stop reason: HOSPADM

## 2021-06-03 RX ORDER — FLUMAZENIL 0.1 MG/ML
0.2 INJECTION, SOLUTION INTRAVENOUS
Status: DISCONTINUED | OUTPATIENT
Start: 2021-06-03 | End: 2021-06-04 | Stop reason: HOSPADM

## 2021-06-03 RX ORDER — NALOXONE HYDROCHLORIDE 0.4 MG/ML
0.2 INJECTION, SOLUTION INTRAMUSCULAR; INTRAVENOUS; SUBCUTANEOUS
Status: DISCONTINUED | OUTPATIENT
Start: 2021-06-03 | End: 2021-06-04 | Stop reason: HOSPADM

## 2021-06-03 RX ORDER — LIDOCAINE 40 MG/G
CREAM TOPICAL
Status: DISCONTINUED | OUTPATIENT
Start: 2021-06-03 | End: 2021-06-04 | Stop reason: HOSPADM

## 2021-06-03 RX ORDER — NICARDIPINE HYDROCHLORIDE 2.5 MG/ML
INJECTION INTRAVENOUS
Status: DISCONTINUED | OUTPATIENT
Start: 2021-06-03 | End: 2021-06-03 | Stop reason: HOSPADM

## 2021-06-03 RX ORDER — ARGATROBAN 1 MG/ML
150 INJECTION, SOLUTION INTRAVENOUS
Status: DISCONTINUED | OUTPATIENT
Start: 2021-06-03 | End: 2021-06-03 | Stop reason: HOSPADM

## 2021-06-03 RX ORDER — FENTANYL CITRATE 50 UG/ML
25-50 INJECTION, SOLUTION INTRAMUSCULAR; INTRAVENOUS
Status: ACTIVE | OUTPATIENT
Start: 2021-06-03 | End: 2021-06-03

## 2021-06-03 RX ORDER — DOPAMINE HYDROCHLORIDE 160 MG/100ML
2-20 INJECTION, SOLUTION INTRAVENOUS CONTINUOUS PRN
Status: DISCONTINUED | OUTPATIENT
Start: 2021-06-03 | End: 2021-06-03 | Stop reason: HOSPADM

## 2021-06-03 RX ORDER — NITROGLYCERIN 20 MG/100ML
10-200 INJECTION INTRAVENOUS CONTINUOUS PRN
Status: DISCONTINUED | OUTPATIENT
Start: 2021-06-03 | End: 2021-06-03 | Stop reason: HOSPADM

## 2021-06-03 RX ORDER — POTASSIUM CHLORIDE 750 MG/1
20 TABLET, EXTENDED RELEASE ORAL
Status: DISCONTINUED | OUTPATIENT
Start: 2021-06-03 | End: 2021-06-03 | Stop reason: HOSPADM

## 2021-06-03 RX ORDER — SODIUM CHLORIDE 9 MG/ML
INJECTION, SOLUTION INTRAVENOUS CONTINUOUS
Status: DISCONTINUED | OUTPATIENT
Start: 2021-06-03 | End: 2021-06-03 | Stop reason: HOSPADM

## 2021-06-03 RX ORDER — ACETAMINOPHEN 325 MG/1
650 TABLET ORAL EVERY 4 HOURS PRN
Status: DISCONTINUED | OUTPATIENT
Start: 2021-06-03 | End: 2021-06-04 | Stop reason: HOSPADM

## 2021-06-03 RX ADMIN — SODIUM CHLORIDE: 9 INJECTION, SOLUTION INTRAVENOUS at 15:38

## 2021-06-03 ASSESSMENT — MIFFLIN-ST. JEOR: SCORE: 983.06

## 2021-06-03 NOTE — DISCHARGE INSTRUCTIONS
Going Home after an Angioplasty or Stent Placement (Cardiac)  ______________________________________________      After you go home:    Have an adult stay with you for 24 hours.    Drink plenty of fluids.    You may eat your normal diet, unless your doctor tells you otherwise.    For 24 hours:    Relax and take it easy.    Do NOT smoke.    Do NOT make any important or legal decisions.    Do NOT drive or operate machines at home or at work.    Do NOT drink alcohol.    Remove the Band-Aid after 24 hours. If there is minor oozing, apply another Band-aid and remove it after 12 hours.    For 2 days, do NOT have sex or do any heavy exercise.    Do NOT take a bath, or use a hot tub or pool for at least 3 days. You may shower.    Care of groin site  It is normal to have a small bruise or lump at the site.    Do not scrub the site.    For the first 2 days: Do not stoop or squat. When you cough, sneeze or move your bowels, hold your hand over the puncture site and press gently.    Do not lift more than 10 pounds for at least 3 to 5 days.    Do not use lotion or powder near the puncture site for 3 days.    If you start bleeding from the site in your groin, lie down flat and press firmly  on the site. Call your doctor as soon as you can.    Care of wrist or arm site  It is normal to have soreness at the puncture site and mild tingling in your hand for up to 3 days.    For 2 days, do not use your hand or arm to support your weight (such as rising from a chair) or bend your wrist (such as lifting a garage door).    For 2 days, do not lift more than 5 pounds or exercise your arm (tennis, golf or bowling).    If you start bleeding from the site in your arm:    Sit down and press firmly on the site with your fingers for 10 minutes. Call your doctor as soon as you can.    If the bleeding stops, sit still and keep your wrist straight for 2 hours.    Medicines    If you have started taking Plavix or Effient, do not stop taking it  until you talk to your heart doctor (cardiologist).    If you are on metformin (Glucophage), do not restart it until you have blood tests (within 2 to 3 days after discharge). When your doctor tells you it is safe, you may restart the metformin.    If you have stopped any other medicines, check with your nurse or provider about when to restart them.    Call 911 right away if you have bleeding that is heavy or does not stop.    Call your doctor if:    You have a large or growing hard lump around the site.    The site is red, swollen, hot or tender.    Blood or fluid is draining from the site.    You have chills or a fever greater than 101 F (38 C).    Your leg or arm feels numb or cool.    You have hives, a rash or unusual itching.      [facility] [place]:  [phone] (7 days a week)

## 2021-06-03 NOTE — PRE-PROCEDURE
GENERAL PRE-PROCEDURE:   Procedure:  Coronary angiogram (diagnostic only)  Date/Time:  6/3/2021 3:22 PM    Verbal consent obtained?: Yes    Written consent obtained?: Yes    Risks and benefits: Risks, benefits and alternatives were discussed    DC Plan: Appropriate discharge home plan in place for patients who are going home after procedure   Consent given by:  Patient  Patient states understanding of procedure being performed: Yes    Patient's understanding of procedure matches consent: Yes    Procedure consent matches procedure scheduled: Yes    Expected level of sedation:  Moderate  Appropriately NPO:  Yes  ASA Class:  Class 2- mild systemic disease, no acute problems, no functional limitations  Mallampati  :  Grade 3- soft palate visible, posterior pharyngeal wall not visible  Lungs:  Lungs clear with good breath sounds bilaterally  Heart:  Normal heart sounds and rate  History & Physical reviewed:  History and physical reviewed and no updates needed  Statement of review:  I have reviewed the lab findings, diagnostic data, medications, and the plan for sedation    CLEMENT Christensen, CNP  [facility]

## 2021-06-04 LAB — INTERPRETATION ECG - MUSE: NORMAL

## 2021-06-04 NOTE — PROVIDER NOTIFICATION
Notified provider patient started bleeding after removal of TR band. Applied pressure and reapplied TR band

## 2021-06-07 ENCOUNTER — CARE COORDINATION (OUTPATIENT)
Dept: CARDIOLOGY | Facility: CLINIC | Age: 86
End: 2021-06-07

## 2021-06-07 NOTE — PROGRESS NOTES
Called patient to discuss results of angiogram. Patient has minimal non-obstructive disease and will not need bypass surgery.    Also reviewed medications patient is to take up to day of surgery and medication to take morning of surgery and last dose of Lovenox.  Patient verbalized understanding and will call with any questions or concerns.     Also faxed order for COVID test to [facility] COVID testing center for patient to get test on Saturday June 12th at 10:30.

## 2021-06-11 DIAGNOSIS — Z95.2 S/P AVR (AORTIC VALVE REPLACEMENT): ICD-10-CM

## 2021-06-11 DIAGNOSIS — I71.21 ASCENDING AORTIC ANEURYSM (H): Primary | ICD-10-CM

## 2021-06-11 RX ORDER — CLINDAMYCIN PHOSPHATE 900 MG/50ML
900 INJECTION, SOLUTION INTRAVENOUS SEE ADMIN INSTRUCTIONS
Status: CANCELLED | OUTPATIENT
Start: 2021-06-11

## 2021-06-11 RX ORDER — CLINDAMYCIN PHOSPHATE 900 MG/50ML
900 INJECTION, SOLUTION INTRAVENOUS
Status: CANCELLED | OUTPATIENT
Start: 2021-06-11

## 2021-06-11 RX ORDER — DEXMEDETOMIDINE HYDROCHLORIDE 4 UG/ML
0.2-1.2 INJECTION, SOLUTION INTRAVENOUS CONTINUOUS
Status: CANCELLED | OUTPATIENT
Start: 2021-06-11

## 2021-06-11 RX ORDER — ACETAMINOPHEN 325 MG/1
975 TABLET ORAL ONCE
Status: CANCELLED | OUTPATIENT
Start: 2021-06-11 | End: 2021-06-11

## 2021-06-11 RX ORDER — CHLORHEXIDINE GLUCONATE ORAL RINSE 1.2 MG/ML
10 SOLUTION DENTAL ONCE
Status: CANCELLED | OUTPATIENT
Start: 2021-06-11 | End: 2021-06-11

## 2021-06-11 RX ORDER — LIDOCAINE 40 MG/G
CREAM TOPICAL
Status: CANCELLED | OUTPATIENT
Start: 2021-06-11

## 2021-06-11 RX ORDER — GABAPENTIN 100 MG/1
100 CAPSULE ORAL
Status: CANCELLED | OUTPATIENT
Start: 2021-06-11

## 2021-06-12 ENCOUNTER — NURSE TRIAGE (OUTPATIENT)
Dept: NURSING | Facility: CLINIC | Age: 86
End: 2021-06-12

## 2021-06-12 NOTE — TELEPHONE ENCOUNTER
Triage Call:    Patient has surgery on Tuesday and forgot about her COVID testing appointment for today.  She is wondering what the next option would be.  She is unable to get into the [facility] early tomorrow, as there are options available at the [facility] site.      Called patient back, as found a location in [place] that does same day PCR testing.  It is [facility], phone number [phone].   Advised patient to also sign a release of information for  [facility] release of records when she is there.    As an extra layer of insurance, obtained appointment at 2:00 PM on Monday at the [facility] COVID testing site.    Patient has a hard time seeing well and was concerned about the information she wrote down.  Advised would contact her daughter Dona, who is on her file, and provide information.      Attempted to call Dona x2 and left voicemail.  Will try again tomorrow to confirm plan for Monday with COVID testing and directions to clinic.    Routing encounter to Cardiology team, to facilitate getting COVID test results into chart before surgery.      May Becerra RN on 6/12/2021 at 4:04 PM

## 2021-06-13 DIAGNOSIS — Z11.59 ENCOUNTER FOR SCREENING FOR OTHER VIRAL DISEASES: Primary | ICD-10-CM

## 2021-06-13 NOTE — TELEPHONE ENCOUNTER
Called patient back and she wasn't able to get one done at the urgent Care today.  She did go back to the [facility] facility was able to get one with the order faxed by [facility] today.    Patient is requesting a call back on Monday morning if result from [facility] is unable to be gotten by noon, as she does still have the 14:00 appointment at [place] for a COVID swab also.    .May Becerra RN on 6/13/2021 at 12:26 PM

## 2021-06-13 NOTE — TELEPHONE ENCOUNTER
Spoke with patient's daughter Dona this am and provided information about plan for COVID testing and address of testing location for Monday at 2:00.  She verbalized understanding and had no further questions.    May Becerra RN on 6/13/2021 at 8:18 AM

## 2021-06-14 ENCOUNTER — ANESTHESIA EVENT (OUTPATIENT)
Dept: SURGERY | Facility: CLINIC | Age: 86
DRG: 220 | End: 2021-06-14
Payer: MEDICARE

## 2021-06-14 DIAGNOSIS — Z11.59 ENCOUNTER FOR SCREENING FOR OTHER VIRAL DISEASES: ICD-10-CM

## 2021-06-14 LAB
LABORATORY COMMENT REPORT: NORMAL
SARS-COV-2 RNA RESP QL NAA+PROBE: NEGATIVE
SARS-COV-2 RNA RESP QL NAA+PROBE: NORMAL
SPECIMEN SOURCE: NORMAL
SPECIMEN SOURCE: NORMAL

## 2021-06-14 PROCEDURE — U0003 INFECTIOUS AGENT DETECTION BY NUCLEIC ACID (DNA OR RNA); SEVERE ACUTE RESPIRATORY SYNDROME CORONAVIRUS 2 (SARS-COV-2) (CORONAVIRUS DISEASE [COVID-19]), AMPLIFIED PROBE TECHNIQUE, MAKING USE OF HIGH THROUGHPUT TECHNOLOGIES AS DESCRIBED BY CMS-2020-01-R: HCPCS | Performed by: SPECIALIST

## 2021-06-14 PROCEDURE — U0005 INFEC AGEN DETEC AMPLI PROBE: HCPCS | Performed by: SPECIALIST

## 2021-06-14 NOTE — TELEPHONE ENCOUNTER
Spoke to patient's daughter and she will contact [facility] testing center in [place] and ask results be faxed to this writer at [phone].

## 2021-06-15 ENCOUNTER — APPOINTMENT (OUTPATIENT)
Dept: GENERAL RADIOLOGY | Facility: CLINIC | Age: 86
DRG: 220 | End: 2021-06-15
Attending: SURGERY
Payer: MEDICARE

## 2021-06-15 ENCOUNTER — ANCILLARY PROCEDURE (OUTPATIENT)
Dept: CARDIOLOGY | Facility: CLINIC | Age: 86
DRG: 220 | End: 2021-06-15
Attending: INTERNAL MEDICINE
Payer: MEDICARE

## 2021-06-15 ENCOUNTER — ANCILLARY PROCEDURE (OUTPATIENT)
Dept: ULTRASOUND IMAGING | Facility: CLINIC | Age: 86
End: 2021-06-15
Payer: MEDICARE

## 2021-06-15 ENCOUNTER — ANESTHESIA (OUTPATIENT)
Dept: SURGERY | Facility: CLINIC | Age: 86
DRG: 220 | End: 2021-06-15
Payer: MEDICARE

## 2021-06-15 ENCOUNTER — HOSPITAL ENCOUNTER (INPATIENT)
Facility: CLINIC | Age: 86
LOS: 11 days | Discharge: HOME | DRG: 220 | End: 2021-06-26
Attending: SURGERY | Admitting: SURGERY
Payer: MEDICARE

## 2021-06-15 DIAGNOSIS — M85.80 OSTEOPENIA, UNSPECIFIED LOCATION: Primary | ICD-10-CM

## 2021-06-15 DIAGNOSIS — I71.21 ASCENDING AORTIC ANEURYSM (H): ICD-10-CM

## 2021-06-15 DIAGNOSIS — Z95.2 S/P AVR (AORTIC VALVE REPLACEMENT): ICD-10-CM

## 2021-06-15 DIAGNOSIS — Z86.79 S/P ASCENDING AORTIC ANEURYSM REPAIR: ICD-10-CM

## 2021-06-15 DIAGNOSIS — Z98.890 S/P ASCENDING AORTIC ANEURYSM REPAIR: ICD-10-CM

## 2021-06-15 LAB
ABO + RH BLD: NORMAL
ABO + RH BLD: NORMAL
ALBUMIN SERPL-MCNC: 2.3 G/DL (ref 3.4–5)
ALP SERPL-CCNC: 30 U/L (ref 40–150)
ALT SERPL W P-5'-P-CCNC: 37 U/L (ref 0–50)
ANGLE RATE OF CLOT GROWTH: 53.2 DEG (ref 59–74)
ANGLE RATE OF CLOT GROWTH: 72.4 DEG (ref 59–74)
ANGLE RATE OF CLOT GROWTH: 73.4 DEG (ref 59–74)
ANGLE RATE OF CLOT GROWTH: ABNORMAL DEG (ref 59–74)
ANION GAP SERPL CALCULATED.3IONS-SCNC: 12 MMOL/L (ref 3–14)
ANION GAP SERPL CALCULATED.3IONS-SCNC: 12 MMOL/L (ref 3–14)
APTT PPP: 119 SEC (ref 22–37)
APTT PPP: >240 SEC (ref 22–37)
AST SERPL W P-5'-P-CCNC: ABNORMAL U/L (ref 0–45)
BASE DEFICIT BLDA-SCNC: 0.9 MMOL/L
BASE DEFICIT BLDA-SCNC: 1 MMOL/L
BASE DEFICIT BLDA-SCNC: 1.2 MMOL/L
BASE DEFICIT BLDA-SCNC: 1.6 MMOL/L
BASE DEFICIT BLDA-SCNC: 5.5 MMOL/L
BASE DEFICIT BLDA-SCNC: 6 MMOL/L
BASE DEFICIT BLDV-SCNC: 1.5 MMOL/L
BASE EXCESS BLDA CALC-SCNC: 0.2 MMOL/L
BASE EXCESS BLDA CALC-SCNC: 2.2 MMOL/L
BASE EXCESS BLDV CALC-SCNC: 3.3 MMOL/L
BILIRUB SERPL-MCNC: 1.2 MG/DL (ref 0.2–1.3)
BLD GP AB SCN SERPL QL: NORMAL
BLD PROD TYP BPU: NORMAL
BLD UNIT ID BPU: 0
BLOOD BANK CMNT PATIENT-IMP: NORMAL
BLOOD PRODUCT CODE: NORMAL
BPU ID: NORMAL
BUN SERPL-MCNC: 18 MG/DL (ref 7–30)
BUN SERPL-MCNC: 20 MG/DL (ref 7–30)
CA-I BLD-MCNC: 2.9 MG/DL (ref 4.4–5.2)
CA-I BLD-MCNC: 3.2 MG/DL (ref 4.4–5.2)
CA-I BLD-MCNC: 3.5 MG/DL (ref 4.4–5.2)
CA-I BLD-MCNC: 3.9 MG/DL (ref 4.4–5.2)
CA-I BLD-MCNC: 4.2 MG/DL (ref 4.4–5.2)
CA-I BLD-MCNC: 4.6 MG/DL (ref 4.4–5.2)
CA-I BLD-MCNC: 4.6 MG/DL (ref 4.4–5.2)
CALCIUM SERPL-MCNC: 7.4 MG/DL (ref 8.5–10.1)
CALCIUM SERPL-MCNC: 7.7 MG/DL (ref 8.5–10.1)
CHLORIDE SERPL-SCNC: 110 MMOL/L (ref 94–109)
CHLORIDE SERPL-SCNC: 110 MMOL/L (ref 94–109)
CI HYPERCOAGULATION INDEX: 2.5 RATIO (ref 0–3)
CI HYPERCOAGULATION INDEX: 2.6 RATIO (ref 0–3)
CI HYPERCOAGULATION INDEX: ABNORMAL RATIO (ref 0–3)
CI HYPOCOAGULATION INDEX: 4.5 RATIO (ref 0–3)
CI HYPOCOAGULATION INDEX: ABNORMAL RATIO (ref 0–3)
CLOT LYSIS 30M P MA LENFR BLD TEG: 0 % (ref 0–8)
CLOT LYSIS 30M P MA LENFR BLD TEG: ABNORMAL % (ref 0–8)
CLOT STRENGTH BLD TEG: 11.1 KD/SC (ref 5.3–13.2)
CLOT STRENGTH BLD TEG: 4.2 KD/SC (ref 5.3–13.2)
CLOT STRENGTH BLD TEG: 8.8 KD/SC (ref 5.3–13.2)
CLOT STRENGTH BLD TEG: ABNORMAL KD/SC (ref 5.3–13.2)
CO2 SERPL-SCNC: 24 MMOL/L (ref 20–32)
CO2 SERPL-SCNC: 24 MMOL/L (ref 20–32)
CREAT SERPL-MCNC: 1.01 MG/DL (ref 0.52–1.04)
CREAT SERPL-MCNC: 1.08 MG/DL (ref 0.52–1.04)
CREAT SERPL-MCNC: 1.63 MG/DL (ref 0.52–1.04)
ERYTHROCYTE [DISTWIDTH] IN BLOOD BY AUTOMATED COUNT: 16.5 % (ref 10–15)
ERYTHROCYTE [DISTWIDTH] IN BLOOD BY AUTOMATED COUNT: 16.6 % (ref 10–15)
FIBRINOGEN PPP-MCNC: 113 MG/DL (ref 200–420)
GFR SERPL CREATININE-BSD FRML MDRD: 28 ML/MIN/{1.73_M2}
GFR SERPL CREATININE-BSD FRML MDRD: 46 ML/MIN/{1.73_M2}
GFR SERPL CREATININE-BSD FRML MDRD: 50 ML/MIN/{1.73_M2}
GLUCOSE BLD-MCNC: 127 MG/DL (ref 70–99)
GLUCOSE BLD-MCNC: 128 MG/DL (ref 70–99)
GLUCOSE BLD-MCNC: 142 MG/DL (ref 70–99)
GLUCOSE BLD-MCNC: 162 MG/DL (ref 70–99)
GLUCOSE BLD-MCNC: 166 MG/DL (ref 70–99)
GLUCOSE BLD-MCNC: 219 MG/DL (ref 70–99)
GLUCOSE BLD-MCNC: 254 MG/DL (ref 70–99)
GLUCOSE BLD-MCNC: 260 MG/DL (ref 70–99)
GLUCOSE BLDC GLUCOMTR-MCNC: 101 MG/DL (ref 70–99)
GLUCOSE BLDC GLUCOMTR-MCNC: 106 MG/DL (ref 70–99)
GLUCOSE BLDC GLUCOMTR-MCNC: 143 MG/DL (ref 70–99)
GLUCOSE BLDC GLUCOMTR-MCNC: 99 MG/DL (ref 70–99)
GLUCOSE SERPL-MCNC: 104 MG/DL (ref 70–99)
GLUCOSE SERPL-MCNC: 149 MG/DL (ref 70–99)
HCO3 BLD-SCNC: 20 MMOL/L (ref 21–28)
HCO3 BLD-SCNC: 22 MMOL/L (ref 21–28)
HCO3 BLD-SCNC: 24 MMOL/L (ref 21–28)
HCO3 BLD-SCNC: 26 MMOL/L (ref 21–28)
HCO3 BLDV-SCNC: 24 MMOL/L (ref 21–28)
HCO3 BLDV-SCNC: 28 MMOL/L (ref 21–28)
HCT VFR BLD AUTO: 35.3 % (ref 35–47)
HCT VFR BLD AUTO: 36.5 % (ref 35–47)
HGB BLD-MCNC: 10 G/DL (ref 11.7–15.7)
HGB BLD-MCNC: 10.3 G/DL (ref 11.7–15.7)
HGB BLD-MCNC: 12.5 G/DL (ref 11.7–15.7)
HGB BLD-MCNC: 12.8 G/DL (ref 11.7–15.7)
HGB BLD-MCNC: 6 G/DL (ref 11.7–15.7)
HGB BLD-MCNC: 7.7 G/DL (ref 11.7–15.7)
HGB BLD-MCNC: 8.7 G/DL (ref 11.7–15.7)
HGB BLD-MCNC: 8.8 G/DL (ref 11.7–15.7)
HGB BLD-MCNC: 9.1 G/DL (ref 11.7–15.7)
HGB BLD-MCNC: 9.4 G/DL (ref 11.7–15.7)
HGB BLD-MCNC: 9.9 G/DL (ref 11.7–15.7)
INR PPP: 1.44 (ref 0.86–1.14)
INR PPP: 1.86 (ref 0.86–1.14)
INTERPRETATION ECG - MUSE: NORMAL
K TIME TO SPEC CLOT STRENGTH: 1.2 MIN (ref 1–3)
K TIME TO SPEC CLOT STRENGTH: 1.3 MIN (ref 1–3)
K TIME TO SPEC CLOT STRENGTH: 3.6 MIN (ref 1–3)
K TIME TO SPEC CLOT STRENGTH: ABNORMAL MIN (ref 1–3)
LACTATE BLD-SCNC: 1.3 MMOL/L (ref 0.7–2)
LACTATE BLD-SCNC: 1.4 MMOL/L (ref 0.7–2)
LACTATE BLD-SCNC: 1.9 MMOL/L (ref 0.7–2)
LACTATE BLD-SCNC: 2.8 MMOL/L (ref 0.7–2)
LACTATE BLD-SCNC: 3 MMOL/L (ref 0.7–2)
LACTATE BLD-SCNC: 3.6 MMOL/L (ref 0.7–2)
LACTATE BLD-SCNC: 4 MMOL/L (ref 0.7–2)
LY60 LYSIS AT 60 MINUTES: 0 % (ref 0–15)
LY60 LYSIS AT 60 MINUTES: 0 % (ref 0–15)
LY60 LYSIS AT 60 MINUTES: 1.7 % (ref 0–15)
LY60 LYSIS AT 60 MINUTES: ABNORMAL % (ref 0–15)
MA MAXIMUM CLOT STRENGTH: 45.7 MM (ref 55–74)
MA MAXIMUM CLOT STRENGTH: 63.9 MM (ref 55–74)
MA MAXIMUM CLOT STRENGTH: 68.9 MM (ref 55–74)
MA MAXIMUM CLOT STRENGTH: ABNORMAL MM (ref 55–74)
MAGNESIUM SERPL-MCNC: 3.3 MG/DL (ref 1.6–2.3)
MAGNESIUM SERPL-MCNC: 3.4 MG/DL (ref 1.6–2.3)
MCH RBC QN AUTO: 31.2 PG (ref 26.5–33)
MCH RBC QN AUTO: 31.3 PG (ref 26.5–33)
MCHC RBC AUTO-ENTMCNC: 35.1 G/DL (ref 31.5–36.5)
MCHC RBC AUTO-ENTMCNC: 35.4 G/DL (ref 31.5–36.5)
MCV RBC AUTO: 89 FL (ref 78–100)
MCV RBC AUTO: 89 FL (ref 78–100)
NUM BPU REQUESTED: 12
NUM BPU REQUESTED: 4
NUM BPU REQUESTED: 8
O2/TOTAL GAS SETTING VFR VENT: 100 %
O2/TOTAL GAS SETTING VFR VENT: 60 %
O2/TOTAL GAS SETTING VFR VENT: 70 %
O2/TOTAL GAS SETTING VFR VENT: 78 %
O2/TOTAL GAS SETTING VFR VENT: 80 %
O2/TOTAL GAS SETTING VFR VENT: 90 %
OXYHGB MFR BLD: 98 % (ref 92–100)
OXYHGB MFR BLD: 99 % (ref 92–100)
OXYHGB MFR BLD: 99 % (ref 92–100)
OXYHGB MFR BLDV: 95 %
OXYHGB MFR BLDV: 96 %
PCO2 BLD: 34 MM HG (ref 35–45)
PCO2 BLD: 38 MM HG (ref 35–45)
PCO2 BLD: 40 MM HG (ref 35–45)
PCO2 BLD: 40 MM HG (ref 35–45)
PCO2 BLD: 41 MM HG (ref 35–45)
PCO2 BLD: 42 MM HG (ref 35–45)
PCO2 BLD: 43 MM HG (ref 35–45)
PCO2 BLD: 59 MM HG (ref 35–45)
PCO2 BLDV: 40 MM HG (ref 40–50)
PCO2 BLDV: 45 MM HG (ref 40–50)
PH BLD: 7.18 PH (ref 7.35–7.45)
PH BLD: 7.3 PH (ref 7.35–7.45)
PH BLD: 7.36 PH (ref 7.35–7.45)
PH BLD: 7.37 PH (ref 7.35–7.45)
PH BLD: 7.39 PH (ref 7.35–7.45)
PH BLD: 7.39 PH (ref 7.35–7.45)
PH BLD: 7.45 PH (ref 7.35–7.45)
PH BLD: 7.46 PH (ref 7.35–7.45)
PH BLDV: 7.34 PH (ref 7.32–7.43)
PH BLDV: 7.45 PH (ref 7.32–7.43)
PHOSPHATE SERPL-MCNC: 2.2 MG/DL (ref 2.5–4.5)
PHOSPHATE SERPL-MCNC: 2.3 MG/DL (ref 2.5–4.5)
PLATELET # BLD AUTO: 172 10E9/L (ref 150–450)
PLATELET # BLD AUTO: 191 10E9/L (ref 150–450)
PLATELET # BLD AUTO: 243 10E9/L (ref 150–450)
PO2 BLD: 217 MM HG (ref 80–105)
PO2 BLD: 240 MM HG (ref 80–105)
PO2 BLD: 263 MM HG (ref 80–105)
PO2 BLD: 325 MM HG (ref 80–105)
PO2 BLD: 441 MM HG (ref 80–105)
PO2 BLD: 455 MM HG (ref 80–105)
PO2 BLD: 473 MM HG (ref 80–105)
PO2 BLD: 599 MM HG (ref 80–105)
PO2 BLDV: 87 MM HG (ref 25–47)
PO2 BLDV: 96 MM HG (ref 25–47)
POTASSIUM BLD-SCNC: 3.6 MMOL/L (ref 3.4–5.3)
POTASSIUM BLD-SCNC: 3.7 MMOL/L (ref 3.4–5.3)
POTASSIUM BLD-SCNC: 3.8 MMOL/L (ref 3.4–5.3)
POTASSIUM BLD-SCNC: 3.9 MMOL/L (ref 3.4–5.3)
POTASSIUM BLD-SCNC: 4 MMOL/L (ref 3.4–5.3)
POTASSIUM BLD-SCNC: 4.5 MMOL/L (ref 3.4–5.3)
POTASSIUM BLD-SCNC: 5 MMOL/L (ref 3.4–5.3)
POTASSIUM BLD-SCNC: 5.5 MMOL/L (ref 3.4–5.3)
POTASSIUM SERPL-SCNC: 3.2 MMOL/L (ref 3.4–5.3)
POTASSIUM SERPL-SCNC: 3.6 MMOL/L (ref 3.4–5.3)
POTASSIUM SERPL-SCNC: 3.8 MMOL/L (ref 3.4–5.3)
PROT SERPL-MCNC: 4 G/DL (ref 6.8–8.8)
R TIME UNTIL CLOT FORMS: 3.8 MIN (ref 4–9)
R TIME UNTIL CLOT FORMS: 4.8 MIN (ref 4–9)
R TIME UNTIL CLOT FORMS: 7.6 MIN (ref 4–9)
R TIME UNTIL CLOT FORMS: >90 MIN (ref 4–9)
RBC # BLD AUTO: 3.99 10E12/L (ref 3.8–5.2)
RBC # BLD AUTO: 4.1 10E12/L (ref 3.8–5.2)
SODIUM BLD-SCNC: 138 MMOL/L (ref 133–144)
SODIUM BLD-SCNC: 138 MMOL/L (ref 133–144)
SODIUM BLD-SCNC: 139 MMOL/L (ref 133–144)
SODIUM BLD-SCNC: 140 MMOL/L (ref 133–144)
SODIUM BLD-SCNC: 141 MMOL/L (ref 133–144)
SODIUM BLD-SCNC: 142 MMOL/L (ref 133–144)
SODIUM SERPL-SCNC: 145 MMOL/L (ref 133–144)
SODIUM SERPL-SCNC: 146 MMOL/L (ref 133–144)
SPECIMEN EXP DATE BLD: NORMAL
TRANSFUSION STATUS PATIENT QL: NORMAL
WBC # BLD AUTO: 15.5 10E9/L (ref 4–11)
WBC # BLD AUTO: 21.4 10E9/L (ref 4–11)

## 2021-06-15 PROCEDURE — 93287 PERI-PX DEVICE EVAL & PRGR: CPT

## 2021-06-15 PROCEDURE — 71045 X-RAY EXAM CHEST 1 VIEW: CPT | Mod: 26 | Performed by: RADIOLOGY

## 2021-06-15 PROCEDURE — 258N000003 HC RX IP 258 OP 636: Performed by: SURGERY

## 2021-06-15 PROCEDURE — 85610 PROTHROMBIN TIME: CPT | Performed by: STUDENT IN AN ORGANIZED HEALTH CARE EDUCATION/TRAINING PROGRAM

## 2021-06-15 PROCEDURE — 250N000009 HC RX 250

## 2021-06-15 PROCEDURE — 84132 ASSAY OF SERUM POTASSIUM: CPT | Performed by: ANESTHESIOLOGY

## 2021-06-15 PROCEDURE — 250N000009 HC RX 250: Performed by: SURGERY

## 2021-06-15 PROCEDURE — 82947 ASSAY GLUCOSE BLOOD QUANT: CPT

## 2021-06-15 PROCEDURE — 250N000011 HC RX IP 250 OP 636

## 2021-06-15 PROCEDURE — 88311 DECALCIFY TISSUE: CPT | Mod: 26 | Performed by: PATHOLOGY

## 2021-06-15 PROCEDURE — 93283 PRGRMG EVAL IMPLANTABLE DFB: CPT | Mod: 26 | Performed by: INTERNAL MEDICINE

## 2021-06-15 PROCEDURE — 82810 BLOOD GASES O2 SAT ONLY: CPT

## 2021-06-15 PROCEDURE — C1763 CONN TISS, NON-HUMAN: HCPCS | Performed by: SURGERY

## 2021-06-15 PROCEDURE — 80048 BASIC METABOLIC PNL TOTAL CA: CPT

## 2021-06-15 PROCEDURE — 370N000017 HC ANESTHESIA TECHNICAL FEE, PER MIN: Performed by: SURGERY

## 2021-06-15 PROCEDURE — 250N000009 HC RX 250: Performed by: STUDENT IN AN ORGANIZED HEALTH CARE EDUCATION/TRAINING PROGRAM

## 2021-06-15 PROCEDURE — 360N000079 HC SURGERY LEVEL 6, PER MIN: Performed by: SURGERY

## 2021-06-15 PROCEDURE — 250N000015 HC RX FACTOR IP MED 636 OP 636: Performed by: ANESTHESIOLOGY

## 2021-06-15 PROCEDURE — 85610 PROTHROMBIN TIME: CPT | Performed by: SURGERY

## 2021-06-15 PROCEDURE — 82565 ASSAY OF CREATININE: CPT | Performed by: ANESTHESIOLOGY

## 2021-06-15 PROCEDURE — 84155 ASSAY OF PROTEIN SERUM: CPT

## 2021-06-15 PROCEDURE — 86901 BLOOD TYPING SEROLOGIC RH(D): CPT | Performed by: NURSE ANESTHETIST, CERTIFIED REGISTERED

## 2021-06-15 PROCEDURE — 999N000054 HC STATISTIC EKG NON-CHARGEABLE

## 2021-06-15 PROCEDURE — 82040 ASSAY OF SERUM ALBUMIN: CPT

## 2021-06-15 PROCEDURE — 258N000003 HC RX IP 258 OP 636: Performed by: STUDENT IN AN ORGANIZED HEALTH CARE EDUCATION/TRAINING PROGRAM

## 2021-06-15 PROCEDURE — 84075 ASSAY ALKALINE PHOSPHATASE: CPT

## 2021-06-15 PROCEDURE — 84132 ASSAY OF SERUM POTASSIUM: CPT

## 2021-06-15 PROCEDURE — 85396 CLOTTING ASSAY WHOLE BLOOD: CPT | Performed by: SURGERY

## 2021-06-15 PROCEDURE — 85730 THROMBOPLASTIN TIME PARTIAL: CPT | Performed by: SURGERY

## 2021-06-15 PROCEDURE — 250N000013 HC RX MED GY IP 250 OP 250 PS 637: Performed by: SURGERY

## 2021-06-15 PROCEDURE — 85027 COMPLETE CBC AUTOMATED: CPT | Performed by: SURGERY

## 2021-06-15 PROCEDURE — 999N000065 XR ABDOMEN PORT 1 VIEWS

## 2021-06-15 PROCEDURE — 74018 RADEX ABDOMEN 1 VIEW: CPT | Mod: 26 | Performed by: RADIOLOGY

## 2021-06-15 PROCEDURE — 999N000015 HC STATISTIC ARTERIAL MONITORING DAILY

## 2021-06-15 PROCEDURE — 82330 ASSAY OF CALCIUM: CPT

## 2021-06-15 PROCEDURE — 82803 BLOOD GASES ANY COMBINATION: CPT

## 2021-06-15 PROCEDURE — 272N000085 HC PACK CELL SAVER CSP: Performed by: SURGERY

## 2021-06-15 PROCEDURE — 250N000009 HC RX 250: Performed by: NURSE ANESTHETIST, CERTIFIED REGISTERED

## 2021-06-15 PROCEDURE — 82330 ASSAY OF CALCIUM: CPT | Performed by: SURGERY

## 2021-06-15 PROCEDURE — 86923 COMPATIBILITY TEST ELECTRIC: CPT | Performed by: NURSE ANESTHETIST, CERTIFIED REGISTERED

## 2021-06-15 PROCEDURE — P9059 PLASMA, FRZ BETWEEN 8-24HOUR: HCPCS | Performed by: SURGERY

## 2021-06-15 PROCEDURE — 258N000003 HC RX IP 258 OP 636: Performed by: NURSE ANESTHETIST, CERTIFIED REGISTERED

## 2021-06-15 PROCEDURE — 410N000004: Performed by: SURGERY

## 2021-06-15 PROCEDURE — 999N001017 HC STATISTIC GLUCOSE BY METER IP

## 2021-06-15 PROCEDURE — 272N000001 HC OR GENERAL SUPPLY STERILE: Performed by: SURGERY

## 2021-06-15 PROCEDURE — 84295 ASSAY OF SERUM SODIUM: CPT

## 2021-06-15 PROCEDURE — 250N000011 HC RX IP 250 OP 636: Performed by: SURGERY

## 2021-06-15 PROCEDURE — 272N000004 HC RX 272: Performed by: SURGERY

## 2021-06-15 PROCEDURE — C9132 KCENTRA, PER I.U.: HCPCS | Performed by: ANESTHESIOLOGY

## 2021-06-15 PROCEDURE — 93283 PRGRMG EVAL IMPLANTABLE DFB: CPT

## 2021-06-15 PROCEDURE — 93010 ELECTROCARDIOGRAM REPORT: CPT | Mod: 76 | Performed by: INTERNAL MEDICINE

## 2021-06-15 PROCEDURE — 80048 BASIC METABOLIC PNL TOTAL CA: CPT | Performed by: SURGERY

## 2021-06-15 PROCEDURE — 93005 ELECTROCARDIOGRAM TRACING: CPT

## 2021-06-15 PROCEDURE — 86900 BLOOD TYPING SEROLOGIC ABO: CPT | Performed by: NURSE ANESTHETIST, CERTIFIED REGISTERED

## 2021-06-15 PROCEDURE — 93287 PERI-PX DEVICE EVAL & PRGR: CPT | Mod: 26 | Performed by: INTERNAL MEDICINE

## 2021-06-15 PROCEDURE — 85384 FIBRINOGEN ACTIVITY: CPT | Performed by: SURGERY

## 2021-06-15 PROCEDURE — 999N000155 HC STATISTIC RAPCV CVP MONITORING

## 2021-06-15 PROCEDURE — 88311 DECALCIFY TISSUE: CPT | Mod: TC | Performed by: SURGERY

## 2021-06-15 PROCEDURE — 36415 COLL VENOUS BLD VENIPUNCTURE: CPT | Performed by: ANESTHESIOLOGY

## 2021-06-15 PROCEDURE — 85018 HEMOGLOBIN: CPT | Performed by: ANESTHESIOLOGY

## 2021-06-15 PROCEDURE — P9016 RBC LEUKOCYTES REDUCED: HCPCS | Performed by: NURSE ANESTHETIST, CERTIFIED REGISTERED

## 2021-06-15 PROCEDURE — 250N000013 HC RX MED GY IP 250 OP 250 PS 637: Performed by: STUDENT IN AN ORGANIZED HEALTH CARE EDUCATION/TRAINING PROGRAM

## 2021-06-15 PROCEDURE — 82805 BLOOD GASES W/O2 SATURATION: CPT | Performed by: SURGERY

## 2021-06-15 PROCEDURE — 86850 RBC ANTIBODY SCREEN: CPT | Performed by: NURSE ANESTHETIST, CERTIFIED REGISTERED

## 2021-06-15 PROCEDURE — 250N000024 HC ISOFLURANE, PER MIN: Performed by: SURGERY

## 2021-06-15 PROCEDURE — 84132 ASSAY OF SERUM POTASSIUM: CPT | Performed by: SURGERY

## 2021-06-15 PROCEDURE — 250N000011 HC RX IP 250 OP 636: Performed by: STUDENT IN AN ORGANIZED HEALTH CARE EDUCATION/TRAINING PROGRAM

## 2021-06-15 PROCEDURE — 85396 CLOTTING ASSAY WHOLE BLOOD: CPT | Performed by: ANESTHESIOLOGY

## 2021-06-15 PROCEDURE — 83735 ASSAY OF MAGNESIUM: CPT | Performed by: SURGERY

## 2021-06-15 PROCEDURE — 250N000011 HC RX IP 250 OP 636: Performed by: NURSE ANESTHETIST, CERTIFIED REGISTERED

## 2021-06-15 PROCEDURE — 999N000065 XR CHEST PORT 1 VIEW

## 2021-06-15 PROCEDURE — 83605 ASSAY OF LACTIC ACID: CPT | Performed by: SURGERY

## 2021-06-15 PROCEDURE — 410N000003 HC PER-PERFUSION 1ST 30 MIN: Performed by: SURGERY

## 2021-06-15 PROCEDURE — 250N000011 HC RX IP 250 OP 636: Performed by: ANESTHESIOLOGY

## 2021-06-15 PROCEDURE — 83605 ASSAY OF LACTIC ACID: CPT

## 2021-06-15 PROCEDURE — 82247 BILIRUBIN TOTAL: CPT

## 2021-06-15 PROCEDURE — 84100 ASSAY OF PHOSPHORUS: CPT | Performed by: SURGERY

## 2021-06-15 PROCEDURE — 85384 FIBRINOGEN ACTIVITY: CPT | Performed by: STUDENT IN AN ORGANIZED HEALTH CARE EDUCATION/TRAINING PROGRAM

## 2021-06-15 PROCEDURE — C1768 GRAFT, VASCULAR: HCPCS | Performed by: SURGERY

## 2021-06-15 PROCEDURE — 272N000088 HC PUMP APP ADULT PERFUSION: Performed by: SURGERY

## 2021-06-15 PROCEDURE — 84460 ALANINE AMINO (ALT) (SGPT): CPT

## 2021-06-15 PROCEDURE — P9037 PLATE PHERES LEUKOREDU IRRAD: HCPCS | Performed by: SURGERY

## 2021-06-15 PROCEDURE — 200N000002 HC R&B ICU UMMC

## 2021-06-15 PROCEDURE — 88304 TISSUE EXAM BY PATHOLOGIST: CPT | Mod: 26 | Performed by: PATHOLOGY

## 2021-06-15 PROCEDURE — 85049 AUTOMATED PLATELET COUNT: CPT | Performed by: SURGERY

## 2021-06-15 PROCEDURE — 999N000157 HC STATISTIC RCP TIME EA 10 MIN

## 2021-06-15 PROCEDURE — 999N000141 HC STATISTIC PRE-PROCEDURE NURSING ASSESSMENT: Performed by: SURGERY

## 2021-06-15 PROCEDURE — 999N001063 HC STATISTIC THAWING COMPONENT: Performed by: SURGERY

## 2021-06-15 PROCEDURE — 272N000002 HC OR SUPPLY OTHER OPNP: Performed by: SURGERY

## 2021-06-15 PROCEDURE — 88304 TISSUE EXAM BY PATHOLOGIST: CPT | Mod: TC | Performed by: SURGERY

## 2021-06-15 PROCEDURE — 94002 VENT MGMT INPAT INIT DAY: CPT

## 2021-06-15 DEVICE — IMP PATCH PERICARDIUM PHOTOFIX BOVINE 8X14CM PFP8X14: Type: IMPLANTABLE DEVICE | Site: HEART | Status: FUNCTIONAL

## 2021-06-15 DEVICE — IMPLANTABLE DEVICE: Type: IMPLANTABLE DEVICE | Site: HEART | Status: FUNCTIONAL

## 2021-06-15 RX ORDER — CLINDAMYCIN PHOSPHATE 900 MG/50ML
900 INJECTION, SOLUTION INTRAVENOUS SEE ADMIN INSTRUCTIONS
Status: DISCONTINUED | OUTPATIENT
Start: 2021-06-15 | End: 2021-06-15 | Stop reason: HOSPADM

## 2021-06-15 RX ORDER — NOREPINEPHRINE BITARTRATE 0.06 MG/ML
.01-.6 INJECTION, SOLUTION INTRAVENOUS CONTINUOUS
Status: DISCONTINUED | OUTPATIENT
Start: 2021-06-15 | End: 2021-06-17

## 2021-06-15 RX ORDER — PAROXETINE 10 MG/1
10 TABLET, FILM COATED ORAL EVERY MORNING
Status: DISCONTINUED | OUTPATIENT
Start: 2021-06-16 | End: 2021-06-26 | Stop reason: HOSPADM

## 2021-06-15 RX ORDER — HEPARIN SODIUM 1000 [USP'U]/ML
INJECTION, SOLUTION INTRAVENOUS; SUBCUTANEOUS PRN
Status: DISCONTINUED | OUTPATIENT
Start: 2021-06-15 | End: 2021-06-15

## 2021-06-15 RX ORDER — PROPOFOL 10 MG/ML
INJECTION, EMULSION INTRAVENOUS PRN
Status: DISCONTINUED | OUTPATIENT
Start: 2021-06-15 | End: 2021-06-15

## 2021-06-15 RX ORDER — PROPOFOL 10 MG/ML
INJECTION, EMULSION INTRAVENOUS CONTINUOUS PRN
Status: DISCONTINUED | OUTPATIENT
Start: 2021-06-15 | End: 2021-06-15

## 2021-06-15 RX ORDER — DIPHENHYDRAMINE HYDROCHLORIDE 50 MG/ML
12.5 INJECTION INTRAMUSCULAR; INTRAVENOUS EVERY 6 HOURS PRN
Status: DISCONTINUED | OUTPATIENT
Start: 2021-06-15 | End: 2021-06-26 | Stop reason: HOSPADM

## 2021-06-15 RX ORDER — CALCIUM CHLORIDE 100 MG/ML
INJECTION INTRAVENOUS; INTRAVENTRICULAR PRN
Status: DISCONTINUED | OUTPATIENT
Start: 2021-06-15 | End: 2021-06-15

## 2021-06-15 RX ORDER — NALOXONE HYDROCHLORIDE 0.4 MG/ML
0.2 INJECTION, SOLUTION INTRAMUSCULAR; INTRAVENOUS; SUBCUTANEOUS
Status: DISCONTINUED | OUTPATIENT
Start: 2021-06-15 | End: 2021-06-26 | Stop reason: HOSPADM

## 2021-06-15 RX ORDER — LIDOCAINE 40 MG/G
CREAM TOPICAL
Status: DISCONTINUED | OUTPATIENT
Start: 2021-06-15 | End: 2021-06-15 | Stop reason: HOSPADM

## 2021-06-15 RX ORDER — NICOTINE POLACRILEX 4 MG
15-30 LOZENGE BUCCAL
Status: DISCONTINUED | OUTPATIENT
Start: 2021-06-15 | End: 2021-06-26 | Stop reason: HOSPADM

## 2021-06-15 RX ORDER — DEXMEDETOMIDINE HYDROCHLORIDE 4 UG/ML
.2-1.2 INJECTION, SOLUTION INTRAVENOUS CONTINUOUS
Status: DISCONTINUED | OUTPATIENT
Start: 2021-06-15 | End: 2021-06-18

## 2021-06-15 RX ORDER — SODIUM CHLORIDE, SODIUM GLUCONATE, SODIUM ACETATE, POTASSIUM CHLORIDE AND MAGNESIUM CHLORIDE 526; 502; 368; 37; 30 MG/100ML; MG/100ML; MG/100ML; MG/100ML; MG/100ML
INJECTION, SOLUTION INTRAVENOUS CONTINUOUS PRN
Status: DISCONTINUED | OUTPATIENT
Start: 2021-06-15 | End: 2021-06-15

## 2021-06-15 RX ORDER — ACETAMINOPHEN 325 MG/1
975 TABLET ORAL ONCE
Status: COMPLETED | OUTPATIENT
Start: 2021-06-15 | End: 2021-06-15

## 2021-06-15 RX ORDER — CHLORHEXIDINE GLUCONATE ORAL RINSE 1.2 MG/ML
10 SOLUTION DENTAL ONCE
Status: COMPLETED | OUTPATIENT
Start: 2021-06-15 | End: 2021-06-15

## 2021-06-15 RX ORDER — MUPIROCIN 20 MG/G
0.5 OINTMENT TOPICAL 2 TIMES DAILY
Status: COMPLETED | OUTPATIENT
Start: 2021-06-15 | End: 2021-06-20

## 2021-06-15 RX ORDER — VANCOMYCIN HYDROCHLORIDE 1 G/200ML
1000 INJECTION, SOLUTION INTRAVENOUS
Status: COMPLETED | OUTPATIENT
Start: 2021-06-15 | End: 2021-06-15

## 2021-06-15 RX ORDER — PROPOFOL 10 MG/ML
5-75 INJECTION, EMULSION INTRAVENOUS CONTINUOUS
Status: DISCONTINUED | OUTPATIENT
Start: 2021-06-15 | End: 2021-06-17

## 2021-06-15 RX ORDER — DEXMEDETOMIDINE HYDROCHLORIDE 4 UG/ML
.2-.7 INJECTION, SOLUTION INTRAVENOUS CONTINUOUS PRN
Status: DISCONTINUED | OUTPATIENT
Start: 2021-06-15 | End: 2021-06-15

## 2021-06-15 RX ORDER — ONDANSETRON 2 MG/ML
4 INJECTION INTRAMUSCULAR; INTRAVENOUS EVERY 6 HOURS PRN
Status: DISCONTINUED | OUTPATIENT
Start: 2021-06-15 | End: 2021-06-15

## 2021-06-15 RX ORDER — NOREPINEPHRINE BITARTRATE 0.06 MG/ML
.01-.6 INJECTION, SOLUTION INTRAVENOUS CONTINUOUS
Status: DISCONTINUED | OUTPATIENT
Start: 2021-06-15 | End: 2021-06-15 | Stop reason: HOSPADM

## 2021-06-15 RX ORDER — AMOXICILLIN 250 MG
1 CAPSULE ORAL 2 TIMES DAILY
Status: DISCONTINUED | OUTPATIENT
Start: 2021-06-15 | End: 2021-06-26 | Stop reason: HOSPADM

## 2021-06-15 RX ORDER — PROCHLORPERAZINE MALEATE 5 MG
5 TABLET ORAL EVERY 6 HOURS PRN
Status: DISCONTINUED | OUTPATIENT
Start: 2021-06-15 | End: 2021-06-26 | Stop reason: HOSPADM

## 2021-06-15 RX ORDER — POTASSIUM CHLORIDE 20MEQ/15ML
10 LIQUID (ML) ORAL ONCE
Status: COMPLETED | OUTPATIENT
Start: 2021-06-15 | End: 2021-06-15

## 2021-06-15 RX ORDER — ACETAMINOPHEN 325 MG/1
650 TABLET ORAL EVERY 4 HOURS PRN
Status: DISCONTINUED | OUTPATIENT
Start: 2021-06-18 | End: 2021-06-26 | Stop reason: HOSPADM

## 2021-06-15 RX ORDER — NICARDIPINE HYDROCHLORIDE 0.2 MG/ML
.5-15 INJECTION INTRAVENOUS CONTINUOUS
Status: DISCONTINUED | OUTPATIENT
Start: 2021-06-15 | End: 2021-06-15 | Stop reason: HOSPADM

## 2021-06-15 RX ORDER — LIDOCAINE HYDROCHLORIDE 20 MG/ML
INJECTION, SOLUTION INFILTRATION; PERINEURAL PRN
Status: DISCONTINUED | OUTPATIENT
Start: 2021-06-15 | End: 2021-06-15

## 2021-06-15 RX ORDER — METHOCARBAMOL 500 MG/1
500 TABLET, FILM COATED ORAL EVERY 6 HOURS PRN
Status: DISCONTINUED | OUTPATIENT
Start: 2021-06-15 | End: 2021-06-26 | Stop reason: HOSPADM

## 2021-06-15 RX ORDER — PANTOPRAZOLE SODIUM 40 MG/1
40 TABLET, DELAYED RELEASE ORAL DAILY
Status: DISCONTINUED | OUTPATIENT
Start: 2021-06-15 | End: 2021-06-26 | Stop reason: HOSPADM

## 2021-06-15 RX ORDER — DIPHENHYDRAMINE HCL 12.5MG/5ML
12.5 LIQUID (ML) ORAL EVERY 6 HOURS PRN
Status: DISCONTINUED | OUTPATIENT
Start: 2021-06-15 | End: 2021-06-26 | Stop reason: HOSPADM

## 2021-06-15 RX ORDER — NALOXONE HYDROCHLORIDE 0.4 MG/ML
0.4 INJECTION, SOLUTION INTRAMUSCULAR; INTRAVENOUS; SUBCUTANEOUS
Status: DISCONTINUED | OUTPATIENT
Start: 2021-06-15 | End: 2021-06-15 | Stop reason: HOSPADM

## 2021-06-15 RX ORDER — DOCUSATE SODIUM 100 MG/1
100 CAPSULE, LIQUID FILLED ORAL 2 TIMES DAILY
Status: DISCONTINUED | OUTPATIENT
Start: 2021-06-15 | End: 2021-06-16

## 2021-06-15 RX ORDER — SODIUM CHLORIDE, SODIUM GLUCONATE, SODIUM ACETATE, POTASSIUM CHLORIDE AND MAGNESIUM CHLORIDE 526; 502; 368; 37; 30 MG/100ML; MG/100ML; MG/100ML; MG/100ML; MG/100ML
250 INJECTION, SOLUTION INTRAVENOUS EVERY 10 MIN PRN
Status: DISCONTINUED | OUTPATIENT
Start: 2021-06-15 | End: 2021-06-17

## 2021-06-15 RX ORDER — ACETAMINOPHEN 325 MG/1
975 TABLET ORAL EVERY 8 HOURS
Status: DISPENSED | OUTPATIENT
Start: 2021-06-15 | End: 2021-06-18

## 2021-06-15 RX ORDER — SODIUM CHLORIDE, SODIUM LACTATE, POTASSIUM CHLORIDE, CALCIUM CHLORIDE 600; 310; 30; 20 MG/100ML; MG/100ML; MG/100ML; MG/100ML
INJECTION, SOLUTION INTRAVENOUS CONTINUOUS
Status: DISCONTINUED | OUTPATIENT
Start: 2021-06-15 | End: 2021-06-15 | Stop reason: HOSPADM

## 2021-06-15 RX ORDER — PROTAMINE SULFATE 10 MG/ML
INJECTION, SOLUTION INTRAVENOUS PRN
Status: DISCONTINUED | OUTPATIENT
Start: 2021-06-15 | End: 2021-06-15

## 2021-06-15 RX ORDER — CLINDAMYCIN PHOSPHATE 900 MG/50ML
900 INJECTION, SOLUTION INTRAVENOUS EVERY 8 HOURS
Status: COMPLETED | OUTPATIENT
Start: 2021-06-15 | End: 2021-06-16

## 2021-06-15 RX ORDER — OXYCODONE HYDROCHLORIDE 5 MG/1
5 TABLET ORAL EVERY 4 HOURS PRN
Status: DISCONTINUED | OUTPATIENT
Start: 2021-06-15 | End: 2021-06-26 | Stop reason: HOSPADM

## 2021-06-15 RX ORDER — FLUMAZENIL 0.1 MG/ML
0.2 INJECTION, SOLUTION INTRAVENOUS
Status: DISCONTINUED | OUTPATIENT
Start: 2021-06-15 | End: 2021-06-15 | Stop reason: HOSPADM

## 2021-06-15 RX ORDER — NALOXONE HYDROCHLORIDE 0.4 MG/ML
0.4 INJECTION, SOLUTION INTRAMUSCULAR; INTRAVENOUS; SUBCUTANEOUS
Status: DISCONTINUED | OUTPATIENT
Start: 2021-06-15 | End: 2021-06-26 | Stop reason: HOSPADM

## 2021-06-15 RX ORDER — FENTANYL CITRATE 50 UG/ML
INJECTION, SOLUTION INTRAMUSCULAR; INTRAVENOUS PRN
Status: DISCONTINUED | OUTPATIENT
Start: 2021-06-15 | End: 2021-06-15

## 2021-06-15 RX ORDER — ONDANSETRON 2 MG/ML
4 INJECTION INTRAMUSCULAR; INTRAVENOUS EVERY 6 HOURS PRN
Status: DISCONTINUED | OUTPATIENT
Start: 2021-06-15 | End: 2021-06-26 | Stop reason: HOSPADM

## 2021-06-15 RX ORDER — CLINDAMYCIN PHOSPHATE 900 MG/50ML
900 INJECTION, SOLUTION INTRAVENOUS
Status: COMPLETED | OUTPATIENT
Start: 2021-06-15 | End: 2021-06-15

## 2021-06-15 RX ORDER — HYDRALAZINE HYDROCHLORIDE 20 MG/ML
10 INJECTION INTRAMUSCULAR; INTRAVENOUS EVERY 30 MIN PRN
Status: DISCONTINUED | OUTPATIENT
Start: 2021-06-15 | End: 2021-06-17

## 2021-06-15 RX ORDER — DEXTROSE MONOHYDRATE 25 G/50ML
25-50 INJECTION, SOLUTION INTRAVENOUS
Status: DISCONTINUED | OUTPATIENT
Start: 2021-06-15 | End: 2021-06-26 | Stop reason: HOSPADM

## 2021-06-15 RX ORDER — DEXMEDETOMIDINE HYDROCHLORIDE 4 UG/ML
0.2-1.2 INJECTION, SOLUTION INTRAVENOUS CONTINUOUS
Status: DISCONTINUED | OUTPATIENT
Start: 2021-06-15 | End: 2021-06-15 | Stop reason: HOSPADM

## 2021-06-15 RX ORDER — ONDANSETRON 4 MG/1
4 TABLET, ORALLY DISINTEGRATING ORAL EVERY 6 HOURS PRN
Status: DISCONTINUED | OUTPATIENT
Start: 2021-06-15 | End: 2021-06-26 | Stop reason: HOSPADM

## 2021-06-15 RX ORDER — NALOXONE HYDROCHLORIDE 0.4 MG/ML
0.2 INJECTION, SOLUTION INTRAMUSCULAR; INTRAVENOUS; SUBCUTANEOUS
Status: DISCONTINUED | OUTPATIENT
Start: 2021-06-15 | End: 2021-06-15 | Stop reason: HOSPADM

## 2021-06-15 RX ORDER — ONDANSETRON 2 MG/ML
INJECTION INTRAMUSCULAR; INTRAVENOUS PRN
Status: DISCONTINUED | OUTPATIENT
Start: 2021-06-15 | End: 2021-06-15

## 2021-06-15 RX ORDER — BISACODYL 10 MG
10 SUPPOSITORY, RECTAL RECTAL DAILY PRN
Status: DISCONTINUED | OUTPATIENT
Start: 2021-06-15 | End: 2021-06-26 | Stop reason: HOSPADM

## 2021-06-15 RX ORDER — HYDROMORPHONE HCL IN WATER/PF 6 MG/30 ML
0.2 PATIENT CONTROLLED ANALGESIA SYRINGE INTRAVENOUS
Status: DISCONTINUED | OUTPATIENT
Start: 2021-06-15 | End: 2021-06-26 | Stop reason: HOSPADM

## 2021-06-15 RX ORDER — ESMOLOL HYDROCHLORIDE 10 MG/ML
INJECTION INTRAVENOUS PRN
Status: DISCONTINUED | OUTPATIENT
Start: 2021-06-15 | End: 2021-06-15

## 2021-06-15 RX ORDER — FENTANYL CITRATE 50 UG/ML
25-50 INJECTION, SOLUTION INTRAMUSCULAR; INTRAVENOUS
Status: DISCONTINUED | OUTPATIENT
Start: 2021-06-15 | End: 2021-06-15 | Stop reason: HOSPADM

## 2021-06-15 RX ORDER — KETAMINE HYDROCHLORIDE 10 MG/ML
INJECTION INTRAMUSCULAR; INTRAVENOUS PRN
Status: DISCONTINUED | OUTPATIENT
Start: 2021-06-15 | End: 2021-06-15

## 2021-06-15 RX ORDER — BUPIVACAINE HYDROCHLORIDE 2.5 MG/ML
INJECTION, SOLUTION EPIDURAL; INFILTRATION; INTRACAUDAL PRN
Status: DISCONTINUED | OUTPATIENT
Start: 2021-06-15 | End: 2021-06-15

## 2021-06-15 RX ORDER — HEPARIN SODIUM 5000 [USP'U]/.5ML
5000 INJECTION, SOLUTION INTRAVENOUS; SUBCUTANEOUS EVERY 8 HOURS
Status: DISCONTINUED | OUTPATIENT
Start: 2021-06-16 | End: 2021-06-17

## 2021-06-15 RX ORDER — VANCOMYCIN HYDROCHLORIDE 1 G/200ML
1000 INJECTION, SOLUTION INTRAVENOUS EVERY 12 HOURS
Status: DISCONTINUED | OUTPATIENT
Start: 2021-06-15 | End: 2021-06-15

## 2021-06-15 RX ORDER — LIDOCAINE 40 MG/G
CREAM TOPICAL
Status: DISCONTINUED | OUTPATIENT
Start: 2021-06-15 | End: 2021-06-26 | Stop reason: HOSPADM

## 2021-06-15 RX ORDER — FIBRINOGEN (HUMAN) 700-1300MG
1.05 KIT INTRAVENOUS ONCE
Status: COMPLETED | OUTPATIENT
Start: 2021-06-15 | End: 2021-06-15

## 2021-06-15 RX ORDER — POLYETHYLENE GLYCOL 3350 17 G/17G
17 POWDER, FOR SOLUTION ORAL DAILY
Status: DISCONTINUED | OUTPATIENT
Start: 2021-06-16 | End: 2021-06-26 | Stop reason: HOSPADM

## 2021-06-15 RX ORDER — VANCOMYCIN HYDROCHLORIDE 1 G/200ML
1000 INJECTION, SOLUTION INTRAVENOUS SEE ADMIN INSTRUCTIONS
Status: DISCONTINUED | OUTPATIENT
Start: 2021-06-15 | End: 2021-06-15 | Stop reason: HOSPADM

## 2021-06-15 RX ORDER — PHENYLEPHRINE HCL IN 0.9% NACL 50MG/250ML
.1-6 PLASTIC BAG, INJECTION (ML) INTRAVENOUS CONTINUOUS
Status: DISCONTINUED | OUTPATIENT
Start: 2021-06-15 | End: 2021-06-15 | Stop reason: HOSPADM

## 2021-06-15 RX ORDER — GABAPENTIN 100 MG/1
100 CAPSULE ORAL
Status: COMPLETED | OUTPATIENT
Start: 2021-06-15 | End: 2021-06-15

## 2021-06-15 RX ADMIN — DEXMEDETOMIDINE 0.4 MCG/KG/HR: 100 INJECTION, SOLUTION, CONCENTRATE INTRAVENOUS at 19:57

## 2021-06-15 RX ADMIN — PROTAMINE SULFATE 15 MG: 10 INJECTION, SOLUTION INTRAVENOUS at 15:19

## 2021-06-15 RX ADMIN — CHLORHEXIDINE GLUCONATE 0.12% ORAL RINSE 10 ML: 1.2 LIQUID ORAL at 06:52

## 2021-06-15 RX ADMIN — ACETAMINOPHEN 975 MG: 325 TABLET, FILM COATED ORAL at 06:51

## 2021-06-15 RX ADMIN — HUMAN INSULIN 2 UNITS/HR: 100 INJECTION, SOLUTION SUBCUTANEOUS at 13:52

## 2021-06-15 RX ADMIN — PHENYLEPHRINE HYDROCHLORIDE 50 MCG: 10 INJECTION INTRAVENOUS at 09:13

## 2021-06-15 RX ADMIN — SODIUM CHLORIDE, SODIUM GLUCONATE, SODIUM ACETATE, POTASSIUM CHLORIDE AND MAGNESIUM CHLORIDE: 526; 502; 368; 37; 30 INJECTION, SOLUTION INTRAVENOUS at 15:48

## 2021-06-15 RX ADMIN — MUPIROCIN 0.5 G: 20 OINTMENT TOPICAL at 21:53

## 2021-06-15 RX ADMIN — Medication: at 19:21

## 2021-06-15 RX ADMIN — DOCUSATE SODIUM 50 MG AND SENNOSIDES 8.6 MG 1 TABLET: 8.6; 5 TABLET, FILM COATED ORAL at 20:06

## 2021-06-15 RX ADMIN — PROPOFOL 120 MG: 10 INJECTION, EMULSION INTRAVENOUS at 09:00

## 2021-06-15 RX ADMIN — PROPOFOL 20 MCG/KG/MIN: 10 INJECTION, EMULSION INTRAVENOUS at 19:19

## 2021-06-15 RX ADMIN — DOCUSATE SODIUM 100 MG: 100 CAPSULE, LIQUID FILLED ORAL at 20:05

## 2021-06-15 RX ADMIN — POTASSIUM CHLORIDE 10 MEQ: 20 SOLUTION ORAL at 21:54

## 2021-06-15 RX ADMIN — PROTHROMBIN, COAGULATION FACTOR VII HUMAN, COAGULATION FACTOR IX HUMAN, COAGULATION FACTOR X HUMAN, PROTEIN C, PROTEIN S HUMAN, AND WATER 1140 UNITS: KIT at 15:14

## 2021-06-15 RX ADMIN — PANTOPRAZOLE SODIUM 40 MG: 40 TABLET, DELAYED RELEASE ORAL at 20:41

## 2021-06-15 RX ADMIN — SODIUM CHLORIDE, SODIUM GLUCONATE, SODIUM ACETATE, POTASSIUM CHLORIDE AND MAGNESIUM CHLORIDE: 526; 502; 368; 37; 30 INJECTION, SOLUTION INTRAVENOUS at 08:45

## 2021-06-15 RX ADMIN — PROTAMINE SULFATE 10 MG: 10 INJECTION, SOLUTION INTRAVENOUS at 14:59

## 2021-06-15 RX ADMIN — PROTAMINE SULFATE 110 MG: 10 INJECTION, SOLUTION INTRAVENOUS at 15:02

## 2021-06-15 RX ADMIN — ROCURONIUM BROMIDE 20 MG: 10 INJECTION INTRAVENOUS at 10:49

## 2021-06-15 RX ADMIN — FENTANYL CITRATE 50 MCG: 50 INJECTION, SOLUTION INTRAMUSCULAR; INTRAVENOUS at 08:47

## 2021-06-15 RX ADMIN — Medication 10 ML/HR: at 10:44

## 2021-06-15 RX ADMIN — EPINEPHRINE 0.04 MCG/KG/MIN: 1 INJECTION PARENTERAL at 23:26

## 2021-06-15 RX ADMIN — Medication 0.03 MCG/KG/MIN: at 19:18

## 2021-06-15 RX ADMIN — SUGAMMADEX 200 MG: 100 INJECTION, SOLUTION INTRAVENOUS at 17:10

## 2021-06-15 RX ADMIN — AMINOCAPROIC ACID 1 G/HR: 250 INJECTION, SOLUTION INTRAVENOUS at 11:31

## 2021-06-15 RX ADMIN — FIBRINOGEN (HUMAN) 1.05 G: KIT INTRAVENOUS at 15:14

## 2021-06-15 RX ADMIN — BUPIVACAINE HYDROCHLORIDE 20 ML: 2.5 INJECTION, SOLUTION EPIDURAL; INFILTRATION; INTRACAUDAL; PERINEURAL at 10:24

## 2021-06-15 RX ADMIN — FENTANYL CITRATE 50 MCG: 50 INJECTION, SOLUTION INTRAMUSCULAR; INTRAVENOUS at 07:41

## 2021-06-15 RX ADMIN — ACETAMINOPHEN 975 MG: 325 TABLET, FILM COATED ORAL at 20:40

## 2021-06-15 RX ADMIN — NOREPINEPHRINE BITARTRATE 0.02 MCG/KG/MIN: 1 INJECTION, SOLUTION, CONCENTRATE INTRAVENOUS at 15:04

## 2021-06-15 RX ADMIN — LIDOCAINE HYDROCHLORIDE 100 MG: 20 INJECTION, SOLUTION INFILTRATION; PERINEURAL at 09:00

## 2021-06-15 RX ADMIN — ESMOLOL HYDROCHLORIDE 60 MG: 10 INJECTION, SOLUTION INTRAVENOUS at 08:39

## 2021-06-15 RX ADMIN — VANCOMYCIN HYDROCHLORIDE 1000 MG: 1 INJECTION, SOLUTION INTRAVENOUS at 09:10

## 2021-06-15 RX ADMIN — ROCURONIUM BROMIDE 20 MG: 10 INJECTION INTRAVENOUS at 10:04

## 2021-06-15 RX ADMIN — DEXMEDETOMIDINE 0.4 MCG/KG/HR: 100 INJECTION, SOLUTION, CONCENTRATE INTRAVENOUS at 09:45

## 2021-06-15 RX ADMIN — ROCURONIUM BROMIDE 80 MG: 10 INJECTION INTRAVENOUS at 09:00

## 2021-06-15 RX ADMIN — ONDANSETRON 4 MG: 2 INJECTION INTRAMUSCULAR; INTRAVENOUS at 08:52

## 2021-06-15 RX ADMIN — AMINOCAPROIC ACID 5 G: 250 INJECTION, SOLUTION INTRAVENOUS at 10:35

## 2021-06-15 RX ADMIN — FENTANYL CITRATE 150 MCG: 50 INJECTION, SOLUTION INTRAMUSCULAR; INTRAVENOUS at 09:00

## 2021-06-15 RX ADMIN — Medication 50 MCG/HR: at 19:46

## 2021-06-15 RX ADMIN — HEPARIN SODIUM 24000 UNITS: 1000 INJECTION INTRAVENOUS; SUBCUTANEOUS at 10:27

## 2021-06-15 RX ADMIN — NOREPINEPHRINE BITARTRATE 6.4 MCG: 1 INJECTION, SOLUTION, CONCENTRATE INTRAVENOUS at 11:14

## 2021-06-15 RX ADMIN — PROPOFOL 20 MCG/KG/MIN: 10 INJECTION, EMULSION INTRAVENOUS at 16:25

## 2021-06-15 RX ADMIN — CALCIUM CHLORIDE 1000 MG: 100 INJECTION INTRAVENOUS; INTRAVENTRICULAR at 15:33

## 2021-06-15 RX ADMIN — ROCURONIUM BROMIDE 30 MG: 10 INJECTION INTRAVENOUS at 13:56

## 2021-06-15 RX ADMIN — EPINEPHRINE 0.02 MCG/KG/MIN: 1 INJECTION PARENTERAL at 14:52

## 2021-06-15 RX ADMIN — PHENYLEPHRINE HYDROCHLORIDE 50 MCG: 10 INJECTION INTRAVENOUS at 09:26

## 2021-06-15 RX ADMIN — NICARDIPINE HYDROCHLORIDE 5 MG/HR: 0.2 INJECTION, SOLUTION INTRAVENOUS at 14:44

## 2021-06-15 RX ADMIN — SODIUM PHOSPHATE, MONOBASIC, MONOHYDRATE 9 MMOL: 276; 142 INJECTION, SOLUTION INTRAVENOUS at 23:28

## 2021-06-15 RX ADMIN — Medication: at 19:22

## 2021-06-15 RX ADMIN — Medication 20 MG: at 10:14

## 2021-06-15 RX ADMIN — SODIUM CHLORIDE, SODIUM GLUCONATE, SODIUM ACETATE, POTASSIUM CHLORIDE AND MAGNESIUM CHLORIDE: 526; 502; 368; 37; 30 INJECTION, SOLUTION INTRAVENOUS at 09:30

## 2021-06-15 RX ADMIN — CLINDAMYCIN PHOSPHATE 900 MG: 900 INJECTION, SOLUTION INTRAVENOUS at 09:10

## 2021-06-15 RX ADMIN — GABAPENTIN 100 MG: 100 CAPSULE ORAL at 06:52

## 2021-06-15 RX ADMIN — ROCURONIUM BROMIDE 20 MG: 10 INJECTION INTRAVENOUS at 15:40

## 2021-06-15 RX ADMIN — CLINDAMYCIN IN 5 PERCENT DEXTROSE 900 MG: 18 INJECTION, SOLUTION INTRAVENOUS at 20:06

## 2021-06-15 RX ADMIN — PHENYLEPHRINE HYDROCHLORIDE 100 MCG: 10 INJECTION INTRAVENOUS at 11:14

## 2021-06-15 ASSESSMENT — ENCOUNTER SYMPTOMS: DYSRHYTHMIAS: 1

## 2021-06-15 ASSESSMENT — MIFFLIN-ST. JEOR: SCORE: 974.25

## 2021-06-15 ASSESSMENT — LIFESTYLE VARIABLES: TOBACCO_USE: 1

## 2021-06-15 ASSESSMENT — ACTIVITIES OF DAILY LIVING (ADL): ADLS_ACUITY_SCORE: 20

## 2021-06-15 NOTE — BRIEF OP NOTE
[facility]    Brief Operative Note    Pre-operative diagnosis: Ascending aortic aneurysm (H) [I71.2]  Post-operative diagnosis Same as pre-operative diagnosis    Procedure: Procedure(s):  redo sternotomy, ascending aortic aneurysm repair.  Surgeon: Surgeon(s) and Role:     * Gokul Smith MD - Primary     * Magnolia Jordan MD - Assisting     * Elenita Rivera MD - Fellow - Assisting  Anesthesia: Combined General with Block   Estimated blood loss: * No values recorded between 6/15/2021 10:18 AM and 6/15/2021  4:05 PM *  Drains: Med x 2 ; left pleural x 1  Specimens:   ID Type Source Tests Collected by Time Destination   A : Pseudo aortic aneurysm Tissue Artery, Aortic SURGICAL PATHOLOGY EXAM Gokul Smith MD 6/15/2021  1:01 PM      Findings:   aortic ascending aneurysm.  Complications: None.  Implants:   Implant Name Type Inv. Item Serial No.  Lot No. LRB No. Used Action   IMP PATCH PERICARDIUM PHOTOFIX BOVINE [id number] - [id number] Bone/Tissue/Biologic IMP PATCH PERICARDIUM PHOTOFIX BOVINE [id number]  [facility] [id number] N/A 1 Implanted   GRAFT HEMASHIELD PLATINUM [id number] - [id number] Graft GRAFT HEMASHIELD PLATINUM [id number] [employer] [id number] N/A 1 Implanted

## 2021-06-15 NOTE — ANESTHESIA PROCEDURE NOTES
Airway       Patient location during procedure: OR  Staff -        Anesthesiologist:  Sharla Champagne MD       CRNA: Hanh Patel APRN CRNA       Performed By: CRNAIndications and Patient Condition       Indications for airway management: amanda-procedural       Induction type:intravenous       Mask difficulty assessment: 0 - not attempted    Final Airway Details       Final airway type: endotracheal airway       Successful airway: ETT - single  Endotracheal Airway Details        ETT size (mm): 8.0       Cuffed: yes       Successful intubation technique: direct laryngoscopy       DL Blade Type: Gerber 2       Grade View of Cords: 1       Adjucts: stylet       Position: Right       Measured from: gums/teeth       Secured at (cm): 22       Bite block used: None    Post intubation assessment        Placement verified by: capnometry, equal breath sounds and chest rise        Number of attempts at approach: 1       Number of other approaches attempted: 0       Secured with: pink tape       Ease of procedure: easy (vocal cords open and clear)       Dentition: Intact and Unchanged    Medication(s) Administered   Medication Administration Time: 6/15/2021 9:03 AM    Additional Comments       Patient became very nauseated after giving fenanyl. RSI without masking.

## 2021-06-15 NOTE — ANESTHESIA CARE TRANSFER NOTE
Patient: Racquel Emmanuel    Procedure(s):  redo sternotomy, ascending aortic aneurysm  repair Size 23mm hemashield graft, Lysis of adhesions, On-pump oxygenation, Circulatory arrest, Trans espohageal echocardiogram    Diagnosis: Ascending aortic aneurysm (H) [I71.2]  Diagnosis Additional Information: No value filed.    Anesthesia Type:   General     Note:    Oropharynx: endotracheal tube in place  Level of Consciousness: iatrogenic sedation  Patient oxygen source: vent.  Level of Supplemental Oxygen (L/min / FiO2): 100  Independent Airway: airway patency not satisfactory and stable  Dentition: dentition unchanged  Vital Signs Stable: post-procedure vital signs reviewed and stable  Report to RN Given: handoff report given  Patient transferred to: ICU  Comments: Patient transported to ICU with crna/Dr. Staley/cardiac student. VSS. Transport uneventful.   ICU Handoff: Call for PAUSE to initiate/utilize ICU HANDOFF, Identified Patient, Identified Responsible Provider, Reviewed the Pertinent Medical History, Discussed Surgical Course, Reviewed Intra-OP Anesthesia Management and Issues during Anesthesia, Set Expectations for Post Procedure Period and Allowed Opportunity for Questions and Acknowledgement of Understanding      Vitals: (Last set prior to Anesthesia Care Transfer)  CRNA VITALS  6/15/2021 1613 - 6/15/2021 1711      6/15/2021             Resp Rate (observed):  (!) 0        Electronically Signed By: CLEMENT Barba CRNA  Leeanne 15, 2021  5:11 PM

## 2021-06-15 NOTE — ANESTHESIA PROCEDURE NOTES
Perioperative ABEL Procedure Note    Staff -        Anesthesiologist:  Sharla Champagne MD       Resident/Fellow: Lainey Staley MD       Performed By: fellow  Preanesthesia Checklist:  Patient identified, IV assessed, risks and benefits discussed, monitors and equipment assessed, procedure being performed at surgeon's request and anesthesia consent obtained.    AEBL Probe Insertion  Probe Number: 11  Probe Status PRE Insertion: NO obvious damage  Probe type:  Adult 3D  Bite block used:   Oral Airway  Insertion Technique: Jaw Lift  Insertion complications: None obvious  Billing Report:ABEL report by Anesthesiologist (See Separate Report note)  Probe Status POST Removal: NO obvious damage    ABEL Report  General Procedure Information  Images for this study have been archived.  Modalities: 2D, 3D, CW Doppler, PW Doppler and Color flow mapping  Diagnostic indications for ABEL:   Thoracic aneurysm, unruptured  Echocardiographic and Doppler Measurements  Right Ventricle:  Cavity size normal.   Hypertrophy not present.   Thrombus not present.    Global function normal.     Left Ventricle:  Cavity size normal.   Hypertrophy not present.   Thrombus not present.   Global Function normal.       Ventricular Regional Function:  1- Basal Anteroseptal:  normal  2- Basal Anterior:  normal  3- Basal Anterolateral:  normal  4- Basal Inferolateral:  normal  5- Basal Inferior:  normal  6- Basal Inferoseptal:  normal  7- Mid Anteroseptal:  normal  8- Mid Anterior:  normal  9- Mid Anterolateral:  normal  10- Mid Inferolateral:  normal  11- Mid Inferior:  normal  12- Mid Inferoseptal:  normal  13- Apical Anterior:  normal  14- Apical Lateral:  normal  15- Apical Inferior:  normal  16- Apical Septal:  normal  17- [place]:  normal    Valves  Aortic Valve: Annulus bioprosthetic.  Stenosis not present.  Area: 1.8 cm .  Pressure Half-time: 425 ms.  Regurgitation +2.  Leaflet motions normal.    Mitral Valve: Annulus normal.  Stenosis not  present.  Regurgitation +1.  Leaflets normal.  Leaflet motions normal.    Tricuspid Valve: Annulus normal.  Stenosis not present.  Regurgitation +1.  Leaflets normal.  Leaflet motions normal.    Pulmonic Valve: Annulus normal.  Stenosis not present.  Regurgitation absent.      Aorta: Ascending Aorta: Size dilated.  Diameter 8 cm.  Dissection present.  Plaque thickness less than 3 mm.    Aortic Arch: Size normal.   Dissection not present.   Plaque thickness less than 3 mm.   Mobile plaque not present.    Descending Aorta: Size normal.   Dissection not present.   Plaque thickness less than 3 mm.   Mobile plaque not present.        Right Atrium:  Size normal.   Spontaneous echo contrast not present.   Thrombus not present.   Tumor not present.   Device not present.     Left Atrium: Size normal.  Spontaneous echo contrast not present.  Thrombus not present.  Tumor not present.  Device not present.    Left atrial appendage normal.     Atrial Septum: Intra-atrial septal morphology aneurysmal.     Ventricular Septum: Intra-ventricular septum morphology normal.       Other Findings:   Pericardium:  normal. Pleural Effusion:  none. Pulmonary Arteries:  normal. Cornoary sinus catheter present.   Post Intervention Findings  Procedure(s) performed:  Ascending Aorta Repair. Global function:  Unchanged. Regional wall motion: Unchanged. Surgeon(s) notified of all postintervention findings: Yes. Ascending Aorta:  Ascending aorta repaired with graft.   Post Intervention comments: Ascending aorta replaced with a graft. Post replacement, LV/ RV function looks normal. AI 2+ present (unchanged from pre-CPB). Post CPB PFO seen with left to right flow. No aortic dissection present..    Echocardiogram Comments

## 2021-06-15 NOTE — ANESTHESIA PROCEDURE NOTES
Arterial Line Procedure Note  Pre-Procedure   Staff -        Anesthesiologist:  Sharla Champagne MD       Resident/Fellow: Lainey Staley MD       Performed By: fellow       Location: OR       Pre-Anesthestic Checklist: patient identified, IV checked, risks and benefits discussed, informed consent, monitors and equipment checked, pre-op evaluation and at physician/surgeon's request  Timeout:       Correct Patient: Yes        Correct Procedure: Yes        Correct Site: Yes        Correct Position: Yes   Procedure   Procedure: arterial line       Laterality: left       Insertion Site: radial.  Sterile Prep        Standard elements of sterile barrier followed       Skin prep: Chloraprep  Insertion/Injection        Technique: ultrasound guided        1. Ultrasound was used to evaluate the access site.       2. Artery evaluated via ultrasound for patency/adequacy.       3. Using real-time ultrasound the needle/catheter was observed entering the artery/vein.       5. The visualized structures were anatomically normal.       6. There were no apparent abnormal pathologic findings.       Catheter Type/Size: 20 G, 12 cm  Narrative         Secured by: suture       Tegaderm dressing used.       Complications: None apparent,        Arterial waveform: Yes        IBP within 10% of NIBP: Yes

## 2021-06-15 NOTE — ANESTHESIA PREPROCEDURE EVALUATION
Anesthesia Pre-Procedure Evaluation    Patient: Racquel Emmanuel   MRN: [id number] : 1935        Preoperative Diagnosis: * No surgery found *   Procedure :      Past Medical History:   Diagnosis Date     Anemia 2021     Antiplatelet or antithrombotic long-term use      Aortic aneurysm (H) 3/24/2021     Aortic insufficiency 3/24/2021     Arthritis      Ascending aortic aneurysm (H)      Clinical diagnosis of COVID-19; 2021     Congestive heart failure (H)      Gastroesophageal reflux disease without esophagitis 2021     GI bleed; 2021     History of cholecystectomy 2021     History of lobectomy of lung; 2017 ([place]) 2021     History of lung cancer, right middle lobe; 2021     Hyperlipidemia LDL goal <130 2021     Hypertension      ICD (implantable cardioverter-defibrillator); 2021     Large B-cell lymphoma 2021     Macular degeneration 2021     Nonischemic cardiomyopathy (H) 3/24/2021     MIGUELANGEL (obstructive sleep apnea) 2021     Osteopenia 2021     Oxygen dependent     2.5 l via NC at night time only     Pacemaker      Paroxysmal atrial fibrillation (H) 2021     S/P AVR (22 mm bioprosthetic valve) 3/24/2021     Temporal giant cell arteritis (H) 2021      Past Surgical History:   Procedure Laterality Date     COLECTOMY PARTIAL       COLONOSCOPY       CV CORONARY ANGIOGRAM N/A 6/3/2021    Procedure: CV CORONARY ANGIOGRAM;  Surgeon: Codey Marks MD;  Location: [facility] CARDIAC CATH LAB     ENDOSCOPY       LOBECTOMY LUNG       REPAIR VALVE AORTIC        Allergies   Allergen Reactions     Amoxicillin      Atorvastatin      Codeine      Nsaids       Social History     Tobacco Use     Smoking status: Former Smoker     Quit date: 1990     Years since quittin.4     Smokeless tobacco: Never Used   Substance Use Topics     Alcohol use: Not Currently      Wt Readings from Last 1 Encounters:   06/15/21  "58.1 kg (128 lb 1.4 oz)        Anesthesia Evaluation   Pt has had prior anesthetic. Type: General and MAC (\"slow to wake\" and post op delirium ).        ROS/MED HX  ENT/Pulmonary: Comment: H/o COVID 7/2020- hospitalized. Cough, GI symptoms, subdural hematoma day 3 of hospitalization.  Reports still has cough that has improved, fatigue and memory isses    (+) sleep apnea (O2 2L at night), doesn't use CPAP, tobacco use, Past use,     Neurologic: Comment: Subdural hematoma, resolution on imaging 9/2020 per notes. patient reports she has left arm weakness at the time, but has now recovered       Cardiovascular: Comment: CTA angiogram 4/5/2021  IMPRESSION:   1.  There is a very large saccular aneurysm of the proximal ascending   aorta. The saccular aneurysm's depth at this level is 43.2 mm and the   maximal size of the saccular aneurysm is 81.0 mm. The aneursym's neck   length is 21.5 mm.   2.  A second, smaller saccular aneurysm also emerges from the anterior   aspect of the proximal ascending aorta.   3.  Mild, non-obstructive coronary artery disease in all three major   coronary territories without high-grade stenoses.   4.  Total Agatston coronary calcium score of 2111, placing the patient   in the 96th percentile when compared to an 84-year old    female, since no normative data exist for this patient's age group.   5.  Please review Radiology report for incidental noncardiac findings   that will follow separately.       (+) Dyslipidemia hypertension-----Taking blood thinners CHF pacemaker, ICD  type;[employer] inogen mini Dual lead ICD Settings DDD 50 - 130 bpm dysrhythmias (paroxysmal atrial fibrillation), Previous cardiac testing   Echo: Date: 3/2021 Results:    Stress Test: Date: Results:    ECG Reviewed: Date: Results:    Cath: Date: Results:      METS/Exercise Tolerance: 3 - Able to walk 1-2 blocks without stopping Comment: Housework, can walk >1 block, ascend 1 flight of stairs, but will get " SPRAGUE   Hematologic: Comments: H/o b-cell lymphoma    (+) anemia, history of blood transfusion, no previous transfusion reaction,     Musculoskeletal:  - neg musculoskeletal ROS     GI/Hepatic: Comment: H/o GI bleed in 2019    (+) GERD,     Renal/Genitourinary:     (+) renal disease, type: CRI,     Endo:  - neg endo ROS     Psychiatric/Substance Use:     (+) psychiatric history     Infectious Disease:  - neg infectious disease ROS     Malignancy:   (+) Malignancy, History of Lymphoma/Leukemia and Lung.  Lung CA Remission status post Surgery.  Lymph CA Remission status post Chemo.        Other:            Physical Exam    Airway        Mallampati: I   TM distance: > 3 FB   Neck ROM: full   Mouth opening: > 3 cm    Respiratory Devices and Support         Dental  no notable dental history         Cardiovascular          Rhythm and rate: regular and normal   (+) murmur   (-) no peripheral edema    Pulmonary   pulmonary exam normal        breath sounds clear to auscultation           OUTSIDE LABS:  CBC:   Lab Results   Component Value Date    WBC 7.2 06/03/2021    WBC 7.4 05/10/2021    HGB 10.3 (L) 06/15/2021    HGB 10.0 (L) 06/03/2021    HCT 29.5 (L) 06/03/2021    HCT 30.2 (L) 05/10/2021     06/03/2021     05/10/2021     BMP:   Lab Results   Component Value Date     06/03/2021     (H) 05/10/2021    POTASSIUM 3.8 06/15/2021    POTASSIUM 4.6 06/03/2021    CHLORIDE 108 06/03/2021    CHLORIDE 106 05/10/2021    CO2 28 06/03/2021    CO2 30 05/10/2021    BUN 33 (H) 06/03/2021    BUN 30 05/10/2021    CR 1.63 (H) 06/15/2021    CR 1.46 (H) 06/03/2021     (H) 06/03/2021     (H) 05/10/2021     COAGS:   Lab Results   Component Value Date    PTT 33 06/03/2021    INR 1.09 06/03/2021     POC:   Lab Results   Component Value Date     (H) 06/15/2021     HEPATIC:   Lab Results   Component Value Date    ALBUMIN 3.7 05/10/2021    PROTTOTAL 7.2 05/10/2021    ALT 24 05/10/2021    AST 13 05/10/2021     ALKPHOS 54 05/10/2021    BILITOTAL 0.7 05/10/2021     OTHER:   Lab Results   Component Value Date    CORNELIO 9.1 06/03/2021       Anesthesia Plan    ASA Status:  4   NPO Status:  NPO Appropriate    Anesthesia Type: General.     - Airway: ETT   Induction: Intravenous.   Maintenance: Balanced.   Techniques and Equipment:     - Lines/Monitors: ABEL, 2nd IV, Central Line, PAC, BIS, NIRS, Arterial Line            ABEL Absolute Contra-indication: NONE            ABEL Relative Contra-indication: NONE     - Blood: Blood in Room     - Drips/Meds: Dexmed. infusion, Norepi, Epinephrine     Consents    Anesthesia Plan(s) and associated risks, benefits, and realistic alternatives discussed. Questions answered and patient/representative(s) expressed understanding.     - Discussed with:  Patient      - Extended Intubation/Ventilatory Support Discussed: Yes.      - Patient is DNR/DNI Status: No    Use of blood products discussed: Yes.     - Discussed with: Patient.     - Consented: consented to blood products            Reason for refusal: other.     Postoperative Care    Pain management: IV analgesics.   PONV prophylaxis: Ondansetron (or other 5HT-3)     Comments:                PAC Discussion and Assessment    ASA Classification: 4  Case is suitable for: [place]  Anesthetic techniques and relevant risks discussed: GA                  PAC Resident/NP Anesthesia Assessment: Racquel Emmanuel is an 86 year old female scheduled for redo sternotomy, ascending aortic aneurysm repair on 5/6/21 by Dr. Smith and Dr. Jordan in treatment of Ascending aortic aneurysm repair.  PAC referral for risk assessment and optimization for anesthesia with comorbid conditions of dyslipidemia, paroxysmal atrial fibrillation, cardiomyopathy, MIGUELANGEL, anemia, GERD, h/o GI bleed in 2019, h/o lung cancer s/p lobectomy, temporal cell arteritis, large B-cell lymphoma:                      Pre-operative considerations:           1.  Cardiac:  Functional status- METS 3.    ~PAF using coumadin. Per surgery team, will bridge with lovenox.    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  EF was 55-60% on ECHO 3/91569.    ~h/o aortic insufficiency s/p AVR in 2015   ~now with ascending aortic aneurysm with the above procedure planned           ~ICD in place, interrogation completed today              2.  Pulm:  Airway feasible.  MIGUELANGEL not using CPAP. She reports she is now using 2L O2 at night  ~h/o COVID 7/2020, was hospitalized and reports cough, GI symptoms and subdural hematoma day 3 of hospitalization.  Reports still has cough that has improved, fatigue and memory issues that have not resolved.   ~h/o lung cancer s/p lobectomy. Follows with oncology in [place]. New nodules were seen on imaging. She reports her oncology team has discussed this finding with the cardiothoracic team and it should not be an issue for surgery.              3.  GI:  Risk of PONV score = 3.  If > 2, anti-emetic intervention recommended.          ~GERD using Prilosec   ~h/o GI bleed in 2019, denies recurrent symptoms since that time             4. heme: anemia using ferosul. hgb was 9.8 4/5/21.       5. neuro: h/o subdural hematoma last summer. Per recent cardiology note, repeat head CT 9/2020 showed resolution of subdural hematoma.       6. onc: h/o large B cell lymphoma s/p chemotherapy in 2015.      7. Anesthesia: reports a history of being slow to wake and postop delirium     8. : CRI. Cr today 1.58 (1.4 4/5/21). Reports she follows with nephrology at [facility] every 4-5 months, and kidney function has been stable.              VTE risk: 0.5%                 Patient was also evaluated by Dr. Francis and had a long discussion about anesthesia. She has many questions about surgery, possible complications and what her care would look like if she decides not to proceed with surgery. I will send a message to the cardiothoracic team so they can have a further discussion with the patient and her family about her  surgical concerns.     Patient has well documented medical history. Nothing that can be further optimized prior to surgery. As above, she has many questions she would like to address before deciding if she would like to proceed with surgery.                  **For further details of assessment, testing, and physical exam please see H and P completed on same date.                                 MIKA Miranda MD

## 2021-06-15 NOTE — H&P
CV ICU H&P  6/15/2021      CO-MORBIDITIES:   Patient Active Problem List   Diagnosis     Nonischemic cardiomyopathy (H)     Aortic insufficiency     S/P AVR (22 mm bioprosthetic bovine valve) 6/3/2015 ([place])     Aortic aneurysm (H)     Gastroesophageal reflux disease without esophagitis     Hyperlipidemia LDL goal <130     Temporal giant cell arteritis (H)     Large B-cell lymphoma 2014     Paroxysmal atrial fibrillation (H)     History of lung cancer, right middle lobe; 2/2017     GI bleed; 8/2019     Clinical diagnosis of COVID-19; 7/2020     Macular degeneration     Anemia     Osteopenia     ICD (implantable cardioverter-defibrillator); 2015     History of lobectomy of lung; 2/13/2017 ([place])     History of cholecystectomy     MIGUELANGEL (obstructive sleep apnea)     Ascending aortic aneurysm (H)     Other ill-defined heart diseases     Status post coronary angiogram       ASSESSMENT:   Racquel Emmanuel is a 86 year old female with PMH of NICM 2/2 lymphoma tx, aortic insufficiency (s/p bioprosthetic AVR 6/3/2015), HLD, pAfib (on warfarin), HTN, CKD, MIGUELANGEL, GERD (h/o GI bleed 2019), subdural hematoma, lung CA (s/p lobectomy 2017), temporal arteritis, DLBCL (2014), 8cm ascending aortic aneurysm now s/p  redo sternotomy ascending aortic aneurysm repair with Dr. Smith on 6/15/2021.      =============================================PLAN====================================================  Neuro/ pain/ sedation:  Post-op pain   Chronic back pain  H/O subdural hematoma (2020), no residual deficits  H/O of delirium  - PTA paroxetine  - ES catheters in place w/ ropivacaine infusion  - scheduled APAP q8h  - prn methocarbamol / prn oxycodone/ prn hydromorphone   - fentanyl infusion for pain  - propofol for sedation    Pulmonary care:   Postoperative respiratory insufficiency  H/O Lung SCC s/p RML lobectomy   MIGUELANGEL  - Mechanically ventilated  - Wean FiO2 for O2 sats >92%  - Daily CXR  - Keep chest tubes -20cm H2O  suction    Cardiovascular:  Non-ischemic cardiomyopathy (EF 55-60% 3/2021)  HFpEF  HTN/HLD  Post-operative cardiogenic vs vasoplegic shock  Paroxysmal atrial fibrillation s/p AICD  Hx Aortic Stenosis s/p bioprosthetic AVR (2015)  Ascending Aortic Aneurysm s/p repair with Dr. Smith 6/15/2021  - goal MAP >65, SBP <140   - wean epi as able  - Hold PTA warfarin, metoprolol, digoxin, losartan, statin, torsemide  - No aspirin products  - V Pacing wire, her Dual Lead ICD set at DDD 50      GI care:  IBS  GERD  Hx GI bleed, resolved  - NPO post-operatively while sedated, intubated  - OGT  - bowel regimen  - PPI daily  - prn antiemetics, given nausea with fentanyl    Renal/Fluid, Electrolytes, and nutrition:    CKD (baseline Cr 1.6)  - monitor intake and output.  - Trend BMP daily    Endocrine:    Stress hyperglycemia  - insulin infusion  - hypoglycemia protocol    ID/ Antibiotics:  - perioperative antibiotics: clindamycin, mupirocin, vancomycin  - Trend WBC and fever curve    Heme:  Anemia  H/O B Cell lymphoma  Acute blood loss anemia, EBL 1L  Pre-op Hgb 10.3, post-op 12.8  - Diffuse oozing intraop s/p  5u PRBC, 2u FFP, 2u Plt, 650 Cellsaver, 1g Kcentra, 1g Fibryga  - Monitor CBC    Prophylaxis:  - SCD  - PPI  - No chemical DVT prophylaxis POD0 due to high risk of bleeding    Lines/ tubes/ drains:  - CVL  - arterial line  - ETT  - Self  - NG  - Chest tubes  --- Anterior mediastinal x2  --- Pleural x 2    Left radial a line  Left internal jugular MAC (calcified other side), unable to float jaciel, hands free triple lumen    Disposition:  - CVICU     Patient seen, findings and plan discussed with CV ICU staff.     Vin Gloria, M4    =====================================    HPI:     Racquel Emmanuel is a 86 year old female with PMH of NICM 2/2 lymphoma tx, aortic insufficiency (s/p bioprosthetic AVR 6/3/2015), HLD, pAfib (on warfarin), HTN, MIGUELANGEL, GERD (h/o GI bleed 2019), subdural hematoma, lung CA (s/p lobectomy 2017), temporal  arteritis, DLBCL (2014), 8cm ascending aortic aneurysm now s/p  redo sternotomy ascending aortic aneurysm repair with Dr. Smith on 6/15/2021. Mrs. Emmanuel is followed closely by providers at [facility] and in [place].      She has had non-ischemic cardiomyopathy with diastolic dysfunction following chemotherapy for DLBCL treatment in 2015 and subsequently underwent AICD implantation. In 2016 she received bioprosthetic AVR for aortic stenosis. 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  She subsequently underwent RML lobectomy, her cancer is currently in remission.    Her ascending aortic saccular aneurysm (diameter to 8.6 cm) has been known to the patient and care providers since 10/2019 when it was incidentally diagnosed on enchocardiography follow-up at [facility] for her aortic valve replacement surgery in 2015. At the initial diagnosis, patient wanted time to think about her treatment options, including possible surgical intervention.  Follow-up CT in 02/2020 demonstrated interval enlargement of the aneurysm sac, now measuring up to 8.3 x 4.7 x 7.6 cm. Per outside provider, patient has reported fatigue and dyspnea and requires a wheelchair to move around despite being able to ambulate for short distances.     Preoperatively, patient had an episode of nausea during induction with fentanyl. Has known nausea with codeine. No aspiration. Intraoperatively, notable for EBL 1L and diffuse bleeding off pump. Unable to completely warm her prior to coming off pump. Given 5u PRBC, 2u FFP, 2u Plt, 650 Cellsaver, 1g Kcentra, 1g Fibryga, 2.2L crystalloid. TEG normal post products.    Cardiac cath w/ little occlusion 30% LAD and 30% RCA occlusion. TTE- initial normal LVEF and RV, mild MR, mild TR, mild to mod AI. Post CPB - circulatory arrest then return to pump.           PAST MEDICAL HISTORY:   Past Medical History:   Diagnosis Date     Anemia 4/5/2021     Antiplatelet or  antithrombotic long-term use      Aortic aneurysm (H) 3/24/2021     Aortic insufficiency 3/24/2021     Arthritis      Ascending aortic aneurysm (H)      Clinical diagnosis of COVID-19; 2021     Congestive heart failure (H)      Gastroesophageal reflux disease without esophagitis 2021     GI bleed; 2021     History of cholecystectomy 2021     History of lobectomy of lung; 2017 ([place]) 2021     History of lung cancer, right middle lobe; 2021     Hyperlipidemia LDL goal <130 2021     Hypertension      ICD (implantable cardioverter-defibrillator); 2021     Large B-cell lymphoma 2021     Macular degeneration 2021     Nonischemic cardiomyopathy (H) 3/24/2021     MIGUELANGEL (obstructive sleep apnea) 2021     Osteopenia 2021     Oxygen dependent     2.5 l via NC at night time only     Pacemaker      Paroxysmal atrial fibrillation (H) 2021     S/P AVR (22 mm bioprosthetic valve) 3/24/2021     Temporal giant cell arteritis (H) 2021       PAST SURGICAL HISTORY:   Past Surgical History:   Procedure Laterality Date     COLECTOMY PARTIAL       COLONOSCOPY       CV CORONARY ANGIOGRAM N/A 6/3/2021    Procedure: CV CORONARY ANGIOGRAM;  Surgeon: Codey Marks MD;  Location: [facility] CARDIAC CATH LAB     ENDOSCOPY       LOBECTOMY LUNG       REPAIR VALVE AORTIC       FAMILY HISTORY:   Family History   Problem Relation Age of Onset     Anesthesia Reaction No family hx of      Deep Vein Thrombosis (DVT) No family hx of      SOCIAL HISTORY:   Social History     Tobacco Use     Smoking status: Former Smoker     Quit date: 1990     Years since quittin.4     Smokeless tobacco: Never Used   Substance Use Topics     Alcohol use: Not Currently       Medication Details Provider Last Reconciliation Status   acetaminophen (TYLENOL) 500 MG tablet Take 500-1,000 mg by mouth every 8 hours as needed for mild pain Reported, Patient Needs  Review   calcitRIOL (ROCALTROL) 0.25 MCG capsule Take 0.25 mcg by mouth daily Reported, Patient Needs Review   calcium carbonate (TUMS) 500 MG chewable tablet Take 1 chew tab by mouth every 4 hours as needed for heartburn Reported, Patient Needs Review   digoxin (LANOXIN) 125 MCG tablet Take 125 mcg by mouth three times a week Take on Mon,  Wed, Fri Reported, Patient Needs Review   enoxaparin ANTICOAGULANT (LOVENOX) 80 MG/0.8ML syringe Last dose 6/13/21 PM Reported, Patient Needs Review   ferrous sulfate (FEROSUL) 325 (65 Fe) MG tablet Take 1 tablet by mouth twice daily on three days a week (Mon Wed and Fri only) Reported, Patient Needs Review   fexofenadine (ALLEGRA) 180 MG tablet Take 180 mg by mouth daily Reported, Patient Needs Review   losartan (COZAAR) 25 MG tablet Take 12.5 mg by mouth At Bedtime  Reported, Patient Needs Review   lovastatin (MEVACOR) 40 MG tablet Take 40 mg by mouth At Bedtime  Reported, Patient Needs Review   metoprolol succinate ER (TOPROL-XL) 25 MG 24 hr tablet Take 25 mg by mouth daily Reported, Patient Needs Review   multivitamin (OCUVITE) TABS tablet Take 1 tablet by mouth 2 times daily Reported, Patient Needs Review   omeprazole (PRILOSEC) 40 MG DR capsule Take 40 mg by mouth daily Reported, Patient Needs Review   PARoxetine (PAXIL) 10 MG tablet Take 10 mg by mouth every morning  Reported, Patient Needs Review   potassium chloride ER (K-TAB/KLOR-CON) 10 MEQ CR tablet Take 2 tablets by mouth daily (with dinner)  Reported, Patient Needs Review   torsemide (DEMADEX) 20 MG tablet Take 20 mg by mouth daily Reported, Patient Needs Review   triamcinolone (NASACORT) 55 MCG/ACT nasal aerosol Spray 2 sprays into both nostrils daily as needed Reported, Patient Needs Review   warfarin ANTICOAGULANT (COUMADIN) 2.5 MG tablet Take 2.5 mg on Wednesday and Take 5 mg on all other days of the week (or as directed). Takes in the evening. Reported, Patient Needs Review     OBJECTIVE:   1. VITAL SIGNS:    Temp:  [92.8  F (33.8  C)-97.8  F (36.6  C)] 93.6  F (34.2  C)  Pulse:  [59-68] 59  Resp:  [15-17] 15  BP: (130-164)/(69-79) 164/73  MAP:  [68 mmHg-83 mmHg] 80 mmHg  Arterial Line BP: ()/(54-63) 114/61  SpO2:  [97 %-100 %] 100 %  Ventilation Mode: CMV/AC  (Continuous Mandatory Ventilation/ Assist Control)  Rate Set (breaths/minute): 16 breaths/min  Tidal Volume Set (mL): 400 mL  PEEP (cm H2O): 5 cmH2O  Oxygen Concentration (%): 100 %  Resp: 15      2. INTAKE/ OUTPUT:   I/O last 3 completed shifts:  In: 2508 [I.V.:600]  Out: 1850 [Urine:1850]    3. PHYSICAL EXAMINATION:   General: NAD, cool to touch  Neuro: sedated  Resp:mechanically ventialted  CV: RRR, non-cyanotic  Chest tubes to suction, serosanguinous output, no air leak  Incisions: c/d/i  Abdomen: Soft, Non-distended  Self yoselyn urine  Extremities: warm and well perfused    4. INVESTIGATIONS:   Arterial Blood Gases   Recent Labs   Lab 06/15/21  1710 06/15/21  1546 06/15/21  1511 06/15/21  1437   PH 7.36 7.39 7.39 7.46*   PCO2 43 40 40 34*   PO2 263* 240* 325* 599*   HCO3 24 24 24 24     Complete Blood Count   Recent Labs   Lab 06/15/21  1710 06/15/21  1546 06/15/21  1513 06/15/21  1511 06/15/21  1437   WBC 15.5*  --   --   --   --    HGB 12.8 9.9*  --  10.0* 9.4*     --  243  --   --      Basic Metabolic Panel  Recent Labs   Lab 06/15/21  1710 06/15/21  1546 06/15/21  1511 06/15/21  1437 06/15/21  0643 06/15/21  0643   * 142 139 139   < >  --    POTASSIUM 3.2* 3.6 4.5 5.0   < > 3.8   CHLORIDE 110*  --   --   --   --   --    CO2 24  --   --   --   --   --    BUN 18  --   --   --   --   --    CR 1.01  --   --   --   --  1.63*   * 219* 254* 260*   < >  --     < > = values in this interval not displayed.     Liver Function Tests  Recent Labs   Lab 06/15/21  1710 06/15/21  1513   AST Canceled, Test credited  --    ALT 37  --    ALKPHOS 30*  --    BILITOTAL 1.2  --    ALBUMIN 2.3*  --    INR 1.44* 1.86*     Pancreatic Enzymes  No lab  results found in last 7 days.  Coagulation Profile  Recent Labs   Lab 06/15/21  1710 06/15/21  1513   INR 1.44* 1.86*   * >240*         5. RADIOLOGY:   Recent Results (from the past 24 hour(s))   POC US Guidance Needle Placement    Impression    B/l ES catheters   Cardiac Device Check - Inpatient   Result Value    Date Time Interrogation Session [id number]    Implantable Pulse Generator  [employer]    Implantable Pulse Generator Model D012 INOGEN MINI    Implantable Pulse Generator Serial Number [id number]    Type Interrogation Session In Clinic    Clinic Name [facility]    Implantable Pulse Generator Type Defibrillator    Implantable Pulse Generator Implant Date 20151020    Implantable Lead  [employer]    Implantable Lead Model 0295 Reliance 4-Site G    Implantable Lead Serial Number [id number]    Implantable Lead Implant Date 20151020    Implantable Lead Polarity Type Tripolar Lead    Implantable Lead Location Detail 1 UNKNOWN    Implantable Lead Location Right Ventricle    Implantable Lead  [employer]ant    Implantable Lead Model 4087 Flextend    Implantable Lead Serial Number [id number]    Implantable Lead Implant Date 20151020    Implantable Lead Polarity Type Bipolar Lead    Implantable Lead Location Detail 1 UNKNOWN    Implantable Lead Location Right Atrium    Saurabh Setting Mode (NBG Code) DDD    Saurabh Setting Lower Rate Limit 60    Saurabh Setting Maximum Tracking Rate 130    Saurabh Setting Maximum Sensor Rate 130    Saurabh Setting Hysterisis Rate Off    Saurabh Setting FELISA Delay Low 300.0    Saurabh Setting PAV Delay Low 300.0    Saurabh Setting PAV Delay High 220.0    Saurabh Setting FELISA Delay High 220.0    Saurabh Setting AT Mode Switch Rate 170    Saurabh Setting AT Mode Switch Mode VDIR    Lead Channel Setting Sensing Polarity Bipolar    Lead Channel Setting Sensing Sensitivity 0.25    Lead Channel Setting Sensing Adaptation Mode Adaptive    Lead  Channel Setting Sensing Polarity Bipolar    Lead Channel Setting Sensing Sensitivity 0.6    Lead Channel Setting Sensing Adaptation Mode Adaptive    Lead Channel Setting Pacing Polarity Bipolar    Lead Channel Setting Pacing Pulse Width 0.4    Lead Channel Setting Pacing Amplitude 2.8    Lead Channel Setting Pacing Capture Mode Fixed Pacing    Lead Channel Setting Pacing Polarity Bipolar    Lead Channel Setting Pacing Pulse Width 0.4    Lead Channel Setting Pacing Amplitude 2.0    Lead Channel Setting Pacing Capture Mode Fixed Pacing    Zone Setting Type Category VF    Zone Setting Vendor Type Category VF    Zone Setting Detection Interval 286.0    Zone Setting Type Category VT    Zone Setting Vendor Type Category VT    Zone Setting Detection Interval 333.0    Zone Setting Type Category VT    Zone Setting Vendor Type Category VT-1    Lead Channel Impedance Value 480.0    Lead Channel Sensing Intrinsic Amplitude 2.9    Lead Channel Pacing Threshold Amplitude 1.1000    Lead Channel Pacing Threshold Pulse Width 0.4    Lead Channel Impedance Value 682.0    Lead Channel Sensing Intrinsic Amplitude 25    Lead Channel Pacing Threshold Amplitude 0.8000    Lead Channel Pacing Threshold Pulse Width 0.4    Battery Status One Year Remaining    Battery Remaining Longevity 12    Capacitor Charge Type Reformation    Capacitor Last Charge Date Time Fri Mar 26 10:27:21 CDT 2021    Capacitor Charge Time 13.285    Capacitor Charge Type Shock    Capacitor Charge Time 0    Capacitor Charge Energy 0    Saurabh Statistic RA Percent Paced 1    Saurabh Statistic RV Percent Paced 1    Atrial Tachy Statistic AT/AF [place] Percent 1    Therapy Statistic Total Shocks Delivered 0    Therapy Statistic Total Shocks Aborted 1    Therapy Statistic Total ATP Delivered 3    Therapy Statistic Total  Date Time End [id number]    Therapy Statistic Recent Shocks Delivered 0    Therapy Statistic Recent Shocks Aborted 0    Therapy Statistic Recent ATP  Delivered 0    Therapy Statistic Recent Date Time Start Wed Dec 23 06:39:49 CST 2020    Therapy Statistic Recent Date Time End [id number]    Episode Statistic Total Count 33    Episode Statistic Type Category VT    Episode Statistic Vendor Type Category NSVT    Episode Statistic Total Count 4    Episode Statistic Type Category VF    Episode Statistic Vendor Type Category VF    Episode Statistic Total Count 0    Episode Statistic Type Category VF_VT_MONITOR    Episode Statistic Total Date Time End [id number]    Episode Statistic Total Date Time End 80809412225224    Episode Statistic Total Date Time End [id number]    Episode Statistic Recent Count 13    Episode Statistic Type Category AT/AF    Episode Statistic Vendor Type Category AF    Episode Statistic Recent Count 0    Episode Statistic Type Category VT    Episode Statistic Vendor Type Category NSVT    Episode Statistic Recent Count 0    Episode Statistic Type Category VF    Episode Statistic Vendor Type Category VF    Episode Statistic Recent Count 0    Episode Statistic Type Category VF_VT_MONITOR    Episode Statistic Recent Date Time Start Wed Dec 23 06:39:49 CST 2020    Episode Statistic Recent Date Time End [id number]    Episode Statistic Recent Date Time Start Wed Dec 23 06:39:49 CST 2020    Episode Statistic Recent Date Time End [id number]    Episode Statistic Recent Date Time Start Wed Dec 23 06:39:49 CST 2020    Episode Statistic Recent Date Time End [id number]    Episode Statistic Recent Date Time Start Wed Dec 23 06:39:49 CST 2020    Episode Statistic Recent Date Time End [id number]    Narrative    Patient seen on 3C pre-op for evaluation and iterative programming of their ICD per MD orders.     Device: [employer] D012 INOGEN MINI  Normal device function  Mode: DDD  bpm  AP: <1%  : <1%  Intrinsic rhythm: NSR 68 bpm  Lead Trends Appear Stable  Estimated battery longevity to RRT = 1 year.  Atrial Arrhythmia: 13  AT/AF episodes recorded - 5-8 seconds.  AF [place]: <1%  Ventricular Arrhythmia: 0  Setting Changes: LRL increased from 50 to 60 bpm. ICD tachy therapies programmed off. Programming changes made per Anesthesia MD orders.     PALOMA Kenney RN    Dual lead ICD    I have reviewed and interpreted the device interrogation, settings, programming and nurse's summary. The device is functioning within normal device parameters. I agree with the current findings, assessment and plan.   XR Chest Port 1 View    Impression    RESIDENT PRELIMINARY INTERPRETATION  IMPRESSION:   1. Post surgical changes of ascending aortic aneurysm repair with  right perihilar opacities likely related to postsurgical edema/blood  products and/or atelectasis.  2. Bibasilar streaky opacities, likely atelectasis.  3. Left basilar peripheral lucency may relate to loculated substance  emphysema.   XR Abdomen Port 1 View    Impression    RESIDENT PRELIMINARY INTERPRETATION  Impression:   1. Orogastric tube with tip and sidehole in stomach. Non obstructed  bowel gas pattern.  2. Left upper quadrant elliptical peripheral lucency which may  represent subcutaneous emphysema.       =========================================

## 2021-06-15 NOTE — ANESTHESIA PROCEDURE NOTES
Central Line/PA Catheter Placement  Pre-Procedure   Staff -        Anesthesiologist:  Sharla Champagne MD       Resident/Fellow: Lainey Staley MD       Performed By: fellow       Location: OR       Pre-Anesthestic Checklist: patient identified, IV checked, site marked, risks and benefits discussed, informed consent, monitors and equipment checked, pre-op evaluation and at physician/surgeon's request  Timeout:       Correct Patient: Yes        Correct Procedure: Yes        Correct Site: Yes        Correct Position: Yes        Correct Laterality: Yes     Procedure   Procedure: central line       Laterality: left       Insertion Site: internal jugular.       Patient Position: Trendelenburg  Sterile Prep        All elements of maximal sterile barrier technique followed       Patient Prep/Sterile Barriers: draped, hand hygiene, gloves , hat , mask , draped, gown, sterile gel and probe cover       Skin prep: Chloraprep  Insertion/Injection        Technique: ultrasound guided        1. Ultrasound was used to evaluate the access site.       2. Vein evaluated via ultrasound for patency/adequacy.       3. Using real-time ultrasound the needle/catheter was observed entering the artery/vein.       5. The visualized structures were anatomically normal.       6. There were no apparent abnormal pathologic findings.       Introducer Type: 9 Fr, 2-lumen MAC        Hands-off Catheter: Hands-off 3-lumen via introducer        PA Catheter Type: None  Narrative         Secured by: suture       Tegaderm and Biopatch dressing used.       Complications: None apparent,        blood aspirated from all lumens,        All lumens flushed: Yes       Verification method: Ultrasound

## 2021-06-15 NOTE — PROGRESS NOTES
SPIRITUAL HEALTH SERVICES  [facility] ([place] 3C   PRE-SURGERY VISIT    Had pre-surgery visit with pt and daughter.  Provided spiritual support, prayer.   Delroy Rodriguez M.Div ([place])., [facility]  Staff   Pager [phone]

## 2021-06-15 NOTE — PROGRESS NOTES
D: pt admitted to unit 4E from O.R. intubated with a 8.0 ETT secured 221 @ the lip  I: pt placed on a Drager ventilator with settings per M.D.  A: bilateral breath sounds   P: cont to monitor

## 2021-06-15 NOTE — ANESTHESIA PROCEDURE NOTES
Erector spinae Procedure Note  Pre-Procedure   Staff -        Anesthesiologist:  Dakota Avalos DO       Resident/Fellow: Sue Vanegas MD       Performed By: resident       Location: pre-op       Procedure Start/Stop Times: 6/15/2021 7:40 AM and 6/15/2021 8:55 AM       Pre-Anesthestic Checklist: patient identified, IV checked, site marked, risks and benefits discussed, informed consent, monitors and equipment checked, pre-op evaluation, at physician/surgeon's request and post-op pain management  Timeout:       Correct Patient: Yes        Correct Procedure: Yes        Correct Site: Yes        Correct Position: Yes        Correct Laterality: Yes        Site Marked: Yes  Procedure Documentation  Procedure: Erector spinae       Laterality: bilateral       Patient Position: prone       Patient Prep/Sterile Barriers: sterile gloves, mask       Skin prep: Chloraprep      Local skin infiltrated with mL of 1% lidocaine.        Insertion Site: T4-5.       Needle Type: Touhy needle       Needle Gauge: 20.        Needle Length (Inches): 4        Catheter: 18 G.         Catheter threaded easily.         Ultrasound guided       1. Ultrasound was used to identify targeted nerve, plexus, vascular marker, or fascial plane and place a needle adjacent to it in real-time.       2. Ultrasound was used to visualize the spread of anesthetic in close proximity to the above referenced structure.       3. A permanent image is entered into the patient's record.    Assessment/Narrative         The placement was negative for: blood aspirated, painful injection and site bleeding       Paresthesias: No.      Bolus given via. No blood aspirated via catheter.        Secured via Tegaderm and Dermabond.        Insertion/Infusion Method: Continuous Infusion       Complications: none

## 2021-06-15 NOTE — H&P
Cardiothoracic Surgery History and Physical Update    Racquel Emmanuel is a 86 year old female who presents today for redo-sternotomy, ascending aortic aneurysm repair with Dr. Gokul Smith.    There are no interval changes to patient's health history or physical exam since last exam on 5/10/2021. Specifically, no new medications, SOB, cough, fevers, or URI symptoms.  All surgical questions were answered and the patient is in agreement with the plan.  She is ready for the stated procedure today.     Cleveland Keyes PA-C  Cardiothoracic Surgery  p: [phone]

## 2021-06-15 NOTE — OR NURSING
"Device nurse notified at 0700:  \"3C pt Racquel Emmanuel has a ICP/pacemaker. pt is in pre op room 30. procedure start is 0800. Dina RN *[id number]\"    At 0724 PA present at bedside updating H&P and talking with pt and family.    Device nurse at bedside, ICD turned off at 0729. Lower rate increased to 60s  "

## 2021-06-16 ENCOUNTER — APPOINTMENT (OUTPATIENT)
Dept: GENERAL RADIOLOGY | Facility: CLINIC | Age: 86
DRG: 220 | End: 2021-06-16
Attending: SURGERY
Payer: MEDICARE

## 2021-06-16 LAB
ALBUMIN SERPL-MCNC: 2.5 G/DL (ref 3.4–5)
ALP SERPL-CCNC: 35 U/L (ref 40–150)
ALT SERPL W P-5'-P-CCNC: 48 U/L (ref 0–50)
ANION GAP SERPL CALCULATED.3IONS-SCNC: 11 MMOL/L (ref 3–14)
AST SERPL W P-5'-P-CCNC: 52 U/L (ref 0–45)
BASE DEFICIT BLDA-SCNC: 3.3 MMOL/L
BASE DEFICIT BLDA-SCNC: 3.8 MMOL/L
BILIRUB SERPL-MCNC: 0.5 MG/DL (ref 0.2–1.3)
BUN SERPL-MCNC: 24 MG/DL (ref 7–30)
CA-I BLD-MCNC: 4.1 MG/DL (ref 4.4–5.2)
CALCIUM SERPL-MCNC: 7.3 MG/DL (ref 8.5–10.1)
CHLORIDE SERPL-SCNC: 109 MMOL/L (ref 94–109)
CO2 SERPL-SCNC: 24 MMOL/L (ref 20–32)
CREAT SERPL-MCNC: 1.48 MG/DL (ref 0.52–1.04)
ERYTHROCYTE [DISTWIDTH] IN BLOOD BY AUTOMATED COUNT: 17.5 % (ref 10–15)
FIBRINOGEN PPP-MCNC: 217 MG/DL (ref 200–420)
GFR SERPL CREATININE-BSD FRML MDRD: 32 ML/MIN/{1.73_M2}
GLUCOSE BLD-MCNC: 109 MG/DL (ref 70–99)
GLUCOSE BLDC GLUCOMTR-MCNC: 106 MG/DL (ref 70–99)
GLUCOSE BLDC GLUCOMTR-MCNC: 109 MG/DL (ref 70–99)
GLUCOSE BLDC GLUCOMTR-MCNC: 113 MG/DL (ref 70–99)
GLUCOSE BLDC GLUCOMTR-MCNC: 118 MG/DL (ref 70–99)
GLUCOSE BLDC GLUCOMTR-MCNC: 123 MG/DL (ref 70–99)
GLUCOSE BLDC GLUCOMTR-MCNC: 125 MG/DL (ref 70–99)
GLUCOSE BLDC GLUCOMTR-MCNC: 130 MG/DL (ref 70–99)
GLUCOSE BLDC GLUCOMTR-MCNC: 139 MG/DL (ref 70–99)
GLUCOSE BLDC GLUCOMTR-MCNC: 141 MG/DL (ref 70–99)
GLUCOSE BLDC GLUCOMTR-MCNC: 144 MG/DL (ref 70–99)
GLUCOSE BLDC GLUCOMTR-MCNC: 171 MG/DL (ref 70–99)
GLUCOSE BLDC GLUCOMTR-MCNC: 176 MG/DL (ref 70–99)
GLUCOSE BLDC GLUCOMTR-MCNC: 97 MG/DL (ref 70–99)
GLUCOSE SERPL-MCNC: 172 MG/DL (ref 70–99)
HCO3 BLD-SCNC: 22 MMOL/L (ref 21–28)
HCO3 BLD-SCNC: 23 MMOL/L (ref 21–28)
HCT VFR BLD AUTO: 38.5 % (ref 35–47)
HGB BLD-MCNC: 13.1 G/DL (ref 11.7–15.7)
INR PPP: 1.26 (ref 0.86–1.14)
INTERPRETATION ECG - MUSE: NORMAL
INTERPRETATION ECG - MUSE: NORMAL
LACTATE BLD-SCNC: 3.5 MMOL/L (ref 0.7–2)
LACTATE BLD-SCNC: 3.7 MMOL/L (ref 0.7–2)
LACTATE BLD-SCNC: 4 MMOL/L (ref 0.7–2)
MAGNESIUM SERPL-MCNC: 3.1 MG/DL (ref 1.6–2.3)
MCH RBC QN AUTO: 31 PG (ref 26.5–33)
MCHC RBC AUTO-ENTMCNC: 34 G/DL (ref 31.5–36.5)
MCV RBC AUTO: 91 FL (ref 78–100)
O2/TOTAL GAS SETTING VFR VENT: 50 %
O2/TOTAL GAS SETTING VFR VENT: 50 %
OXYHGB MFR BLD: 96 % (ref 92–100)
PCO2 BLD: 43 MM HG (ref 35–45)
PCO2 BLD: 44 MM HG (ref 35–45)
PH BLD: 7.32 PH (ref 7.35–7.45)
PH BLD: 7.32 PH (ref 7.35–7.45)
PHOSPHATE SERPL-MCNC: 5.2 MG/DL (ref 2.5–4.5)
PLATELET # BLD AUTO: 196 10E9/L (ref 150–450)
PO2 BLD: 107 MM HG (ref 80–105)
PO2 BLD: 137 MM HG (ref 80–105)
POTASSIUM SERPL-SCNC: 3.9 MMOL/L (ref 3.4–5.3)
PROT SERPL-MCNC: 4.6 G/DL (ref 6.8–8.8)
RBC # BLD AUTO: 4.22 10E12/L (ref 3.8–5.2)
SODIUM SERPL-SCNC: 144 MMOL/L (ref 133–144)
WBC # BLD AUTO: 24.4 10E9/L (ref 4–11)

## 2021-06-16 PROCEDURE — 999N000155 HC STATISTIC RAPCV CVP MONITORING

## 2021-06-16 PROCEDURE — 200N000002 HC R&B ICU UMMC

## 2021-06-16 PROCEDURE — 83605 ASSAY OF LACTIC ACID: CPT | Performed by: STUDENT IN AN ORGANIZED HEALTH CARE EDUCATION/TRAINING PROGRAM

## 2021-06-16 PROCEDURE — 85027 COMPLETE CBC AUTOMATED: CPT | Performed by: STUDENT IN AN ORGANIZED HEALTH CARE EDUCATION/TRAINING PROGRAM

## 2021-06-16 PROCEDURE — 999N001017 HC STATISTIC GLUCOSE BY METER IP

## 2021-06-16 PROCEDURE — 3E0T3BZ INTRODUCTION OF ANESTHETIC AGENT INTO PERIPHERAL NERVES AND PLEXI, PERCUTANEOUS APPROACH: ICD-10-PCS | Performed by: ANESTHESIOLOGY

## 2021-06-16 PROCEDURE — 82330 ASSAY OF CALCIUM: CPT | Performed by: STUDENT IN AN ORGANIZED HEALTH CARE EDUCATION/TRAINING PROGRAM

## 2021-06-16 PROCEDURE — 82803 BLOOD GASES ANY COMBINATION: CPT | Performed by: STUDENT IN AN ORGANIZED HEALTH CARE EDUCATION/TRAINING PROGRAM

## 2021-06-16 PROCEDURE — 02UX08Z SUPPLEMENT THORACIC AORTA, ASCENDING/ARCH WITH ZOOPLASTIC TISSUE, OPEN APPROACH: ICD-10-PCS | Performed by: SURGERY

## 2021-06-16 PROCEDURE — 71045 X-RAY EXAM CHEST 1 VIEW: CPT

## 2021-06-16 PROCEDURE — 83735 ASSAY OF MAGNESIUM: CPT | Performed by: STUDENT IN AN ORGANIZED HEALTH CARE EDUCATION/TRAINING PROGRAM

## 2021-06-16 PROCEDURE — 258N000003 HC RX IP 258 OP 636: Performed by: STUDENT IN AN ORGANIZED HEALTH CARE EDUCATION/TRAINING PROGRAM

## 2021-06-16 PROCEDURE — 250N000013 HC RX MED GY IP 250 OP 250 PS 637: Performed by: STUDENT IN AN ORGANIZED HEALTH CARE EDUCATION/TRAINING PROGRAM

## 2021-06-16 PROCEDURE — 999N000015 HC STATISTIC ARTERIAL MONITORING DAILY

## 2021-06-16 PROCEDURE — 71045 X-RAY EXAM CHEST 1 VIEW: CPT | Mod: 26 | Performed by: RADIOLOGY

## 2021-06-16 PROCEDURE — 82805 BLOOD GASES W/O2 SATURATION: CPT | Performed by: SURGERY

## 2021-06-16 PROCEDURE — 250N000013 HC RX MED GY IP 250 OP 250 PS 637: Performed by: SURGERY

## 2021-06-16 PROCEDURE — 250N000009 HC RX 250: Performed by: SURGERY

## 2021-06-16 PROCEDURE — 250N000011 HC RX IP 250 OP 636: Performed by: SURGERY

## 2021-06-16 PROCEDURE — 250N000009 HC RX 250: Performed by: STUDENT IN AN ORGANIZED HEALTH CARE EDUCATION/TRAINING PROGRAM

## 2021-06-16 PROCEDURE — 999N000157 HC STATISTIC RCP TIME EA 10 MIN

## 2021-06-16 PROCEDURE — 5A1221Z PERFORMANCE OF CARDIAC OUTPUT, CONTINUOUS: ICD-10-PCS | Performed by: SURGERY

## 2021-06-16 PROCEDURE — 250N000009 HC RX 250

## 2021-06-16 PROCEDURE — 93010 ELECTROCARDIOGRAM REPORT: CPT | Mod: 59 | Performed by: INTERNAL MEDICINE

## 2021-06-16 PROCEDURE — 258N000003 HC RX IP 258 OP 636: Performed by: SURGERY

## 2021-06-16 PROCEDURE — 250N000013 HC RX MED GY IP 250 OP 250 PS 637: Performed by: NURSE PRACTITIONER

## 2021-06-16 PROCEDURE — 02RX0JZ REPLACEMENT OF THORACIC AORTA, ASCENDING/ARCH WITH SYNTHETIC SUBSTITUTE, OPEN APPROACH: ICD-10-PCS | Performed by: SURGERY

## 2021-06-16 PROCEDURE — 250N000011 HC RX IP 250 OP 636: Performed by: STUDENT IN AN ORGANIZED HEALTH CARE EDUCATION/TRAINING PROGRAM

## 2021-06-16 PROCEDURE — 84100 ASSAY OF PHOSPHORUS: CPT | Performed by: STUDENT IN AN ORGANIZED HEALTH CARE EDUCATION/TRAINING PROGRAM

## 2021-06-16 PROCEDURE — 83605 ASSAY OF LACTIC ACID: CPT | Performed by: SURGERY

## 2021-06-16 PROCEDURE — 80053 COMPREHEN METABOLIC PANEL: CPT | Performed by: STUDENT IN AN ORGANIZED HEALTH CARE EDUCATION/TRAINING PROGRAM

## 2021-06-16 PROCEDURE — 93005 ELECTROCARDIOGRAM TRACING: CPT

## 2021-06-16 PROCEDURE — 94003 VENT MGMT INPAT SUBQ DAY: CPT

## 2021-06-16 PROCEDURE — 82947 ASSAY GLUCOSE BLOOD QUANT: CPT | Performed by: STUDENT IN AN ORGANIZED HEALTH CARE EDUCATION/TRAINING PROGRAM

## 2021-06-16 RX ORDER — CALCIUM GLUCONATE 20 MG/ML
1 INJECTION, SOLUTION INTRAVENOUS ONCE
Status: COMPLETED | OUTPATIENT
Start: 2021-06-16 | End: 2021-06-16

## 2021-06-16 RX ORDER — ASPIRIN 81 MG/1
81 TABLET, CHEWABLE ORAL DAILY
Status: DISCONTINUED | OUTPATIENT
Start: 2021-06-16 | End: 2021-06-26

## 2021-06-16 RX ORDER — CALCIUM GLUCONATE 20 MG/ML
2 INJECTION, SOLUTION INTRAVENOUS ONCE
Status: COMPLETED | OUTPATIENT
Start: 2021-06-16 | End: 2021-06-16

## 2021-06-16 RX ADMIN — SODIUM CHLORIDE, POTASSIUM CHLORIDE, SODIUM LACTATE AND CALCIUM CHLORIDE 500 ML: 600; 310; 30; 20 INJECTION, SOLUTION INTRAVENOUS at 04:15

## 2021-06-16 RX ADMIN — CALCIUM GLUCONATE 1 G: 20 INJECTION, SOLUTION INTRAVENOUS at 05:21

## 2021-06-16 RX ADMIN — OXYCODONE HYDROCHLORIDE 5 MG: 5 TABLET ORAL at 15:26

## 2021-06-16 RX ADMIN — CLINDAMYCIN IN 5 PERCENT DEXTROSE 900 MG: 18 INJECTION, SOLUTION INTRAVENOUS at 18:24

## 2021-06-16 RX ADMIN — ACETAMINOPHEN 975 MG: 325 TABLET, FILM COATED ORAL at 17:45

## 2021-06-16 RX ADMIN — CLINDAMYCIN IN 5 PERCENT DEXTROSE 900 MG: 18 INJECTION, SOLUTION INTRAVENOUS at 04:15

## 2021-06-16 RX ADMIN — OXYCODONE HYDROCHLORIDE 5 MG: 5 TABLET ORAL at 21:09

## 2021-06-16 RX ADMIN — Medication: at 20:39

## 2021-06-16 RX ADMIN — ACETAMINOPHEN 975 MG: 325 TABLET, FILM COATED ORAL at 01:18

## 2021-06-16 RX ADMIN — DOCUSATE SODIUM 50 MG AND SENNOSIDES 8.6 MG 1 TABLET: 8.6; 5 TABLET, FILM COATED ORAL at 07:44

## 2021-06-16 RX ADMIN — MUPIROCIN 0.5 G: 20 OINTMENT TOPICAL at 21:04

## 2021-06-16 RX ADMIN — PANTOPRAZOLE SODIUM 40 MG: 40 TABLET, DELAYED RELEASE ORAL at 07:46

## 2021-06-16 RX ADMIN — Medication: at 20:38

## 2021-06-16 RX ADMIN — ONDANSETRON 4 MG: 2 INJECTION INTRAMUSCULAR; INTRAVENOUS at 15:26

## 2021-06-16 RX ADMIN — SODIUM CHLORIDE, POTASSIUM CHLORIDE, SODIUM LACTATE AND CALCIUM CHLORIDE 500 ML: 600; 310; 30; 20 INJECTION, SOLUTION INTRAVENOUS at 07:58

## 2021-06-16 RX ADMIN — DOCUSATE SODIUM 100 MG: 50 LIQUID ORAL at 09:44

## 2021-06-16 RX ADMIN — HEPARIN SODIUM 5000 UNITS: 5000 INJECTION, SOLUTION INTRAVENOUS; SUBCUTANEOUS at 12:25

## 2021-06-16 RX ADMIN — CALCIUM GLUCONATE 2 G: 20 INJECTION, SOLUTION INTRAVENOUS at 13:12

## 2021-06-16 RX ADMIN — ACETAMINOPHEN 975 MG: 325 TABLET, FILM COATED ORAL at 09:44

## 2021-06-16 RX ADMIN — DOCUSATE SODIUM 100 MG: 50 LIQUID ORAL at 21:09

## 2021-06-16 RX ADMIN — PROPOFOL 10 MCG/KG/MIN: 10 INJECTION, EMULSION INTRAVENOUS at 17:52

## 2021-06-16 RX ADMIN — SODIUM CHLORIDE, POTASSIUM CHLORIDE, SODIUM LACTATE AND CALCIUM CHLORIDE 250 ML: 600; 310; 30; 20 INJECTION, SOLUTION INTRAVENOUS at 09:50

## 2021-06-16 RX ADMIN — PAROXETINE HYDROCHLORIDE 10 MG: 10 TABLET, FILM COATED ORAL at 07:44

## 2021-06-16 RX ADMIN — HEPARIN SODIUM 5000 UNITS: 5000 INJECTION, SOLUTION INTRAVENOUS; SUBCUTANEOUS at 21:04

## 2021-06-16 RX ADMIN — HUMAN INSULIN 2 UNITS/HR: 100 INJECTION, SOLUTION SUBCUTANEOUS at 04:45

## 2021-06-16 RX ADMIN — DOCUSATE SODIUM 50 MG AND SENNOSIDES 8.6 MG 1 TABLET: 8.6; 5 TABLET, FILM COATED ORAL at 21:09

## 2021-06-16 RX ADMIN — MUPIROCIN 0.5 G: 20 OINTMENT TOPICAL at 07:45

## 2021-06-16 RX ADMIN — ASPIRIN 81 MG CHEWABLE TABLET 81 MG: 81 TABLET CHEWABLE at 12:25

## 2021-06-16 RX ADMIN — POLYETHYLENE GLYCOL 3350 17 G: 17 POWDER, FOR SOLUTION ORAL at 07:44

## 2021-06-16 RX ADMIN — HYDROMORPHONE HYDROCHLORIDE 0.2 MG: 0.2 INJECTION, SOLUTION INTRAMUSCULAR; INTRAVENOUS; SUBCUTANEOUS at 12:25

## 2021-06-16 ASSESSMENT — ACTIVITIES OF DAILY LIVING (ADL)
ADLS_ACUITY_SCORE: 22
ADLS_ACUITY_SCORE: 26
ADLS_ACUITY_SCORE: 22
ADLS_ACUITY_SCORE: 26
ADLS_ACUITY_SCORE: 22
ADLS_ACUITY_SCORE: 26

## 2021-06-16 ASSESSMENT — MIFFLIN-ST. JEOR: SCORE: 990.25

## 2021-06-16 NOTE — PROGRESS NOTES
CV ICU PROGRESS NOTE  June 16, 2021      CO-MORBIDITIES:   Ascending aortic aneurysm (H)  Ascending aortic aneurysm (H)  S/P AVR (22 mm bioprosthetic bovine valve) 6/3/2015 ([facility])    Past Medical History:   Diagnosis Date     Anemia 4/5/2021     Antiplatelet or antithrombotic long-term use      Aortic aneurysm (H) 3/24/2021     Aortic insufficiency 3/24/2021     Arthritis      Ascending aortic aneurysm (H)      Clinical diagnosis of COVID-19; 7/2020 4/5/2021     Congestive heart failure (H)      Gastroesophageal reflux disease without esophagitis 4/5/2021     GI bleed; 8/2019 4/5/2021     History of cholecystectomy 4/5/2021     History of lobectomy of lung; 2/13/2017 ([facility]) 4/5/2021     History of lung cancer, right middle lobe; 2/2017 4/5/2021     Hyperlipidemia LDL goal <130 4/5/2021     Hypertension      ICD (implantable cardioverter-defibrillator); 2015 4/5/2021     Large B-cell lymphoma 2014 4/5/2021     Macular degeneration 4/5/2021     Nonischemic cardiomyopathy (H) 3/24/2021     MIGUELANGEL (obstructive sleep apnea) 4/5/2021     Osteopenia 4/5/2021     Oxygen dependent     2.5 l via NC at night time only     Pacemaker      Paroxysmal atrial fibrillation (H) 4/5/2021     S/P AVR (22 mm bioprosthetic valve) 3/24/2021     Temporal giant cell arteritis (H) 4/5/2021     ASSESSMENT: Racquel Emmanuel is a 86 year old female with PMH of 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 Smith on 6/15/2021.    TODAY'S PROGRESS:   - wean sedation as able for extubation: precedex started overnight  - Daily, PST possible extubation if tolerated (failed pressure support today)  - Start low dose ASA  - Replete calcium as needed (calcium gluconate)   - Bolus LR  again this AM  - Monitor lactate,downtrending this afternoon  - SQH to start today      Neuro/ pain/ sedation:  Post-op pain   Chronic back pain  H/O subdural hematoma (2020), no residual deficits  H/O of delirium  H/O PONV  - PTA paroxetine  - ES catheters in place w/ ropivacaine infusion  - scheduled APAP q8h  - prn methocarbamol / prn oxycodone/ prn hydromorphone   - fentanyl infusion for pain- wean for possible extubation  - propofol and precedex for sedation, wean for possible extubation       Pulmonary care:   Postoperative respiratory insufficiency  H/O Lung SCC s/p RML lobectomy   MIGUELANGEL  - Mechanically ventilated:  Ventilation Mode: CMV/AC  (Continuous Mandatory Ventilation/ Assist Control)  FiO2 (%): 40 %  Rate Set (breaths/minute): 16 breaths/min  Tidal Volume Set (mL): 400 mL  PEEP (cm H2O): 5 cmH2O  Oxygen Concentration (%): 40 %  Resp: 16  - Wean FiO2 for O2 sats >92%  - Daily CXR  - Keep chest tubes -20cm H2O suction  - Daily, PST possible extubation if tolerated (failed pressure support today)    Cardiovascular:  Non-ischemic cardiomyopathy (EF 55-60% 3/2021)  HFpEF  HTN/HLD  Post-operative cardiogenic vs vasoplegic shock  Paroxysmal atrial fibrillation s/p AICD  Hx Aortic Stenosis s/p bioprosthetic AVR (2015)  Ascending Aortic Aneurysm s/p repair with Dr. Smith 6/15/2021  - goal MAP >65, SBP <140   - wean epi and norepi as able  - Hold PTA warfarin, metoprolol, digoxin, losartan, statin, torsemide  - Start aspirin today  - V Pacing wire, her Dual Lead ICD set at DDD 50    GI care:  IBS  GERD  Hx GI bleed, resolved  - NPO   - OGT  - bowel regimen  - PPI daily  - prn antiemetics, given nausea with opioids    Renal/Fluid, Electrolytes, and nutrition:  CKD (baseline Cr 1.6)  Lactic acidosis  - monitor intake and output.  - Trend BMP daily  - Replete calcium as needed (calcium gluconate)   - Bolus LR again this AM  - Monitor lactate,downtrending this afternoon     Endocrine:  Stress hyperglycemia  - insulin  infusion  - hypoglycemia protocol     ID/ Antibiotics:  Tmax 100.4 overnight, resolved  - Perioperative antibiotics: clindamycin, mupirocin, vancomycin  - Trend WBC and fever curve  - No active concerns for infection    Heme:  Anemia  H/O B Cell lymphoma  Acute blood loss anemia, EBL 1L  Leukocytosis, likely demargination and/or operative stress related  Pre-op Hgb 10.3, post-op 12.8  - Diffuse oozing intraop s/p  5u PRBC, 2u FFP, 2u Plt, 650 Cellsaver, 1g Kcentra, 1g Fibryga  - Monitor CBC       Prophylaxis:  - SCD  - PPI  - SQH     Lines/ tubes/ drains:  - CVL- Left internal jugular MAC (calcified other side), unable to float jaciel, hands free triple lumen  - left arterial line  - ETT  - Self  - OG  - Chest tubes  --- Anterior mediastinal x2  --- Pleural x 2        Disposition:  - CVICU      Vin Gloira, M4     Patient seen, findings and plan discussed with CV ICU staff, Dr. Patel.    Above note reviewed and edited by me.    Hilary Fong,   General Surgery, PGY3  [id number]      ====================================  SUBJECTIVE:   Patient stable overnight, elevated lactate to 4.0, given 500 mL LR, spontaneous awakening trial completed, minimal vent settings, no concerns about CT output today, patient failed PST today.    OBJECTIVE:   1. VITAL SIGNS:   Temp:  [92.8  F (33.8  C)-100.4  F (38  C)] 99.5  F (37.5  C)  Pulse:  [59-91] 85  Resp:  [15-16] 16  BP: (164)/(73) 164/73  MAP:  [61 mmHg-88 mmHg] 61 mmHg  Arterial Line BP: ()/(44-65) 98/47  FiO2 (%):  [40 %-70 %] 40 %  SpO2:  [95 %-100 %] 95 %  Ventilation Mode: CMV/AC  (Continuous Mandatory Ventilation/ Assist Control)  FiO2 (%): 40 %  Rate Set (breaths/minute): 16 breaths/min  Tidal Volume Set (mL): 400 mL  PEEP (cm H2O): 5 cmH2O  Oxygen Concentration (%): 40 %  Resp: 16    2. INTAKE/ OUTPUT:   I/O last 3 completed shifts:  In: 7694.5 [I.V.:3547.5; Other:1150; NG/GT:120; IV Piggyback:500]  Out: 2790 [Urine:2320; Chest Tube:470]    3. PHYSICAL  EXAMINATION:   General: NAD, intubated, following commands  Neuro: off sedation, responding to verbal stimuli, following commands.  Resp: mechanically ventilated  CV: RRR, non-cyanotic  Chest tubes to suction, serosanguinous output, no air leak  Incisions: c/d/i  Abdomen: Soft, Non-distended  Self yoselyn urine  Extremities: warm and well perfused    4. INVESTIGATIONS:   Arterial Blood Gases   Recent Labs   Lab 06/16/21 0302 06/16/21  0025 06/15/21  1929 06/15/21  1710   PH 7.32* 7.32* 7.37 7.36   PCO2 44 43 42 43   PO2 137* 107* 217* 263*   HCO3 23 22 24 24     Complete Blood Count   Recent Labs   Lab 06/16/21  0302 06/15/21  1929 06/15/21  1710 06/15/21  1546 06/15/21  1513   WBC 24.4* 21.4* 15.5*  --   --    HGB 13.1 12.5 12.8 9.9*  --     172 191  --  243     Basic Metabolic Panel  Recent Labs   Lab 06/16/21  0302 06/15/21  1929 06/15/21  1710 06/15/21  1546 06/15/21  0643 06/15/21  0643    145* 146* 142   < >  --    POTASSIUM 3.9 3.6 3.2* 3.6   < > 3.8   CHLORIDE 109 110* 110*  --   --   --    CO2 24 24 24  --   --   --    BUN 24 20 18  --   --   --    CR 1.48* 1.08* 1.01  --   --  1.63*   * 104* 149* 219*   < >  --     < > = values in this interval not displayed.     Liver Function Tests  Recent Labs   Lab 06/16/21  0302 06/15/21  2253 06/15/21  1710 06/15/21  1513   AST 52*  --  Canceled, Test credited  --    ALT 48  --  37  --    ALKPHOS 35*  --  30*  --    BILITOTAL 0.5  --  1.2  --    ALBUMIN 2.5*  --  2.3*  --    INR  --  1.26* 1.44* 1.86*     Pancreatic Enzymes  No lab results found in last 7 days.  Coagulation Profile  Recent Labs   Lab 06/15/21  2253 06/15/21  1710 06/15/21  1513   INR 1.26* 1.44* 1.86*   PTT  --  119* >240*     5. RADIOLOGY:   Recent Results (from the past 24 hour(s))   Cardiac Device Check - Inpatient   Result Value    Date Time Interrogation Session [id number]    Implantable Pulse Generator  [employer]    Implantable Pulse Generator Model  D012 CPG Soft MINI    Implantable Pulse Generator Serial Number [id number]    Type Interrogation Session In Clinic    Clinic Name [facility]    Implantable Pulse Generator Type Defibrillator    Implantable Pulse Generator Implant Date 20151020    Implantable Lead  [employer]    Implantable Lead Model 0295 Reliance 4-Site G    Implantable Lead Serial Number [id number]    Implantable Lead Implant Date 20151020    Implantable Lead Polarity Type Tripolar Lead    Implantable Lead Location Detail 1 UNKNOWN    Implantable Lead Location Right Ventricle    Implantable Lead  [employer]ant    Implantable Lead Model 4087 Flextend    Implantable Lead Serial Number [id number]    Implantable Lead Implant Date 20151020    Implantable Lead Polarity Type Bipolar Lead    Implantable Lead Location Detail 1 UNKNOWN    Implantable Lead Location Right Atrium    Saurabh Setting Mode (NBG Code) DDD    Saurabh Setting Lower Rate Limit 60    Saurabh Setting Maximum Tracking Rate 130    Saurabh Setting Maximum Sensor Rate 130    Saurabh Setting Hysterisis Rate Off    Saurabh Setting FELISA Delay Low 300.0    Saurabh Setting PAV Delay Low 300.0    Saurabh Setting PAV Delay High 220.0    Saurabh Setting FELISA Delay High 220.0    Saurabh Setting AT Mode Switch Rate 170    Saurabh Setting AT Mode Switch Mode VDIR    Lead Channel Setting Sensing Polarity Bipolar    Lead Channel Setting Sensing Sensitivity 0.25    Lead Channel Setting Sensing Adaptation Mode Adaptive    Lead Channel Setting Sensing Polarity Bipolar    Lead Channel Setting Sensing Sensitivity 0.6    Lead Channel Setting Sensing Adaptation Mode Adaptive    Lead Channel Setting Pacing Polarity Bipolar    Lead Channel Setting Pacing Pulse Width 0.4    Lead Channel Setting Pacing Amplitude 2.8    Lead Channel Setting Pacing Capture Mode Fixed Pacing    Lead Channel Setting Pacing Polarity Bipolar    Lead Channel Setting Pacing Pulse Width 0.4    Lead Channel Setting Pacing  Amplitude 2.0    Lead Channel Setting Pacing Capture Mode Fixed Pacing    Zone Setting Type Category VF    Zone Setting Vendor Type Category VF    Zone Setting Detection Interval 286.0    Zone Setting Type Category VT    Zone Setting Vendor Type Category VT    Zone Setting Detection Interval 333.0    Zone Setting Type Category VT    Zone Setting Vendor Type Category VT-1    Lead Channel Impedance Value 480.0    Lead Channel Sensing Intrinsic Amplitude 2.9    Lead Channel Pacing Threshold Amplitude 1.1000    Lead Channel Pacing Threshold Pulse Width 0.4    Lead Channel Impedance Value 682.0    Lead Channel Sensing Intrinsic Amplitude 25    Lead Channel Pacing Threshold Amplitude 0.8000    Lead Channel Pacing Threshold Pulse Width 0.4    Battery Status One Year Remaining    Battery Remaining Longevity 12    Capacitor Charge Type Reformation    Capacitor Last Charge Date Time Fri Mar 26 10:27:21 CDT 2021    Capacitor Charge Time 13.285    Capacitor Charge Type Shock    Capacitor Charge Time 0    Capacitor Charge Energy 0    Saurabh Statistic RA Percent Paced 1    Saurabh Statistic RV Percent Paced 1    Atrial Tachy Statistic AT/AF [place] Percent 1    Therapy Statistic Total Shocks Delivered 0    Therapy Statistic Total Shocks Aborted 1    Therapy Statistic Total ATP Delivered 3    Therapy Statistic Total  Date Time End [id number]    Therapy Statistic Recent Shocks Delivered 0    Therapy Statistic Recent Shocks Aborted 0    Therapy Statistic Recent ATP Delivered 0    Therapy Statistic Recent Date Time Start Wed Dec 23 06:39:49 CST 2020    Therapy Statistic Recent Date Time End [id number]    Episode Statistic Total Count 33    Episode Statistic Type Category VT    Episode Statistic Vendor Type Category NSVT    Episode Statistic Total Count 4    Episode Statistic Type Category VF    Episode Statistic Vendor Type Category VF    Episode Statistic Total Count 0    Episode Statistic Type Category VF_VT_MONITOR    Episode  Statistic Total Date Time End 20210615000000    Episode Statistic Total Date Time End 20210615000000    Episode Statistic Total Date Time End [id number]    Episode Statistic Recent Count 13    Episode Statistic Type Category AT/AF    Episode Statistic Vendor Type Category AF    Episode Statistic Recent Count 0    Episode Statistic Type Category VT    Episode Statistic Vendor Type Category NSVT    Episode Statistic Recent Count 0    Episode Statistic Type Category VF    Episode Statistic Vendor Type Category VF    Episode Statistic Recent Count 0    Episode Statistic Type Category VF_VT_MONITOR    Episode Statistic Recent Date Time Start Wed Dec 23 06:39:49 CST 2020    Episode Statistic Recent Date Time End [id number]    Episode Statistic Recent Date Time Start Wed Dec 23 06:39:49 CST 2020    Episode Statistic Recent Date Time End [id number]    Episode Statistic Recent Date Time Start Wed Dec 23 06:39:49 CST 2020    Episode Statistic Recent Date Time End [id number]    Episode Statistic Recent Date Time Start Wed Dec 23 06:39:49 CST 2020    Episode Statistic Recent Date Time End [id number]    Narrative    Patient seen on 3C pre-op for evaluation and iterative programming of their ICD per MD orders.     Device: [employer] D012 INOGEN MINI  Normal device function  Mode: DDD  bpm  AP: <1%  : <1%  Intrinsic rhythm: NSR 68 bpm  Lead Trends Appear Stable  Estimated battery longevity to RRT = 1 year.  Atrial Arrhythmia: 13 AT/AF episodes recorded - 5-8 seconds.  AF Bell: <1%  Ventricular Arrhythmia: 0  Setting Changes: LRL increased from 50 to 60 bpm. ICD tachy therapies programmed off. Programming changes made per Anesthesia MD orders.     PALOMA Kenney RN    Dual lead ICD    I have reviewed and interpreted the device interrogation, settings, programming and nurse's summary. The device is functioning within normal device parameters. I agree with the current findings, assessment and plan.    XR Chest Port 1 View    Narrative    EXAM: XR CHEST PORT 1 VIEW  6/15/2021 5:45 PM     HISTORY:  Post Op CVTS Surgery       COMPARISON:  CT 5/21/2021    FINDINGS:   Left chest wall cardiac device with right atrial and ventricular  leads. Mediastinal and left basilar drain. Endotracheal tube  terminates in the midthoracic trachea. Left IJ central venous catheter  terminates in the mid brachycephalic vein. Intact medial sternotomy  wires. Post surgical changes of right lung lobectomy. Postsurgical  changes of ascending aortic aneurysm repair. Aortic prosthetic valve.    The trachea is midline. The cardiomediastinal silhouette is borderline  enlarged. The pulmonary vasculature are prominent and indistinct.     Bibasilar streaky opacities. Right perihilar opacities. Left basilar  peripheral lucency felt to be superimposed soft tissues. No  significant pneumothorax.         Impression    IMPRESSION:   1. Post surgical changes of ascending aortic aneurysm repair with  right perihilar opacities likely related to postsurgical edema/blood  products and/or atelectasis.  2. Bibasilar streaky opacities, likely atelectasis.  3. Left basilar peripheral lucency likely superimposed soft tissues.  Attention on follow-up.    I have personally reviewed the examination and initial interpretation  and I agree with the findings.    CAROLYN YOON MD   XR Abdomen Port 1 View    Narrative    Examination:  XR ABDOMEN PORT 1 VIEWS 6/15/2021 5:47 PM     Comparison: 5/21/2021    History: eval OGT    Findings: Orogastric tube with tip and sidehole in the stomach. The  small intestine and colon are not distended. There are no abnormal  calcifications. There are no abnormal soft tissue densities. There is  no free air. No evidence of pneumatosis. No portal venous gas. Lung  bases are better characterized on the same day chest x-ray.  Redemonstration of left upper quadrant elliptical peripheral lucency      Impression    Impression:   1.  Orogastric tube with tip and sidehole in stomach. Non obstructed  bowel gas pattern.  2. Left upper quadrant elliptical peripheral lucency which may  represent subcutaneous emphysema.    I have personally reviewed the examination and initial interpretation  and I agree with the findings.    CAROLYN YOON MD   Cardiac Device Check - Inpatient   Result Value    Implantable Pulse Generator  [employer]    Implantable Pulse Generator Model D012 INOGEN MINI    Implantable Pulse Generator Serial Number [id number]    Type Interrogation Session In Clinic    Clinic Name [facility] [place]    Implantable Pulse Generator Type Defibrillator    Implantable Pulse Generator Implant Date 20151020    Implantable Lead  [employer]    Implantable Lead Model 0295 Reliance 4-Site G    Implantable Lead Serial Number [id number]    Implantable Lead Implant Date 20151020    Implantable Lead Polarity Type Tripolar Lead    Implantable Lead Location Detail 1 UNKNOWN    Implantable Lead Location Right Ventricle    Implantable Lead  [employer]ant    Implantable Lead Model 4087 Flextend    Implantable Lead Serial Number [id number]    Implantable Lead Implant Date 20151020    Implantable Lead Polarity Type Bipolar Lead    Implantable Lead Location Detail 1 UNKNOWN    Implantable Lead Location Right Atrium    Saurabh Setting Mode (NBG Code) DDD    Saurabh Setting Lower Rate Limit 50    Saurabh Setting Maximum Tracking Rate 130    Saurabh Setting Maximum Sensor Rate 130    Saurabh Setting Hysterisis Rate Off    Saurabh Setting FELISA Delay Low 300.0    Saurabh Setting PAV Delay Low 300.0    Saurabh Setting PAV Delay High 220.0    Saurabh Setting FELISA Delay High 220.0    Saurabh Setting AT Mode Switch Rate 170    Saurabh Setting AT Mode Switch Mode VDIR    Lead Channel Setting Sensing Polarity Bipolar    Lead Channel Setting Sensing Sensitivity 0.25    Lead Channel Setting Sensing Adaptation Mode Adaptive    Lead Channel Setting  Sensing Polarity Bipolar    Lead Channel Setting Sensing Sensitivity 0.6    Lead Channel Setting Sensing Adaptation Mode Adaptive    Lead Channel Setting Pacing Polarity Bipolar    Lead Channel Setting Pacing Pulse Width 0.4    Lead Channel Setting Pacing Amplitude 2.8    Lead Channel Setting Pacing Capture Mode Fixed Pacing    Lead Channel Setting Pacing Polarity Bipolar    Lead Channel Setting Pacing Pulse Width 0.4    Lead Channel Setting Pacing Amplitude 2.4    Lead Channel Setting Pacing Capture Mode Fixed Pacing    Zone Setting Type Category VF    Zone Setting Vendor Type Category VF    Zone Setting Detection Interval 286.0    Zone Setting Type Category VT    Zone Setting Vendor Type Category VT    Zone Setting Detection Interval 333.0    Zone Setting Type Category VT    Zone Setting Vendor Type Category VT-1    Lead Channel Impedance Value 430    Lead Channel Sensing Intrinsic Amplitude 2.7    Lead Channel Pacing Threshold Amplitude 1    Lead Channel Pacing Threshold Pulse Width 0.4    Lead Channel Impedance Value 682    Lead Channel Sensing Intrinsic Amplitude 25    Lead Channel Pacing Threshold Amplitude 1    Lead Channel Pacing Threshold Pulse Width 0.4    Battery Remaining Longevity 12    Capacitor Charge Type Shock    Capacitor Charge Type Reformation    Capacitor Charge Type FULL_ENERGY    Saurabh Statistic RA Percent Paced 1    Saurabh Statistic RV Percent Paced 1    Atrial Tachy Statistic AT/AF [place] Percent 1    Episode Statistic Recent Count 55    Episode Statistic Type Category AT/AF    Episode Statistic Vendor Type Category AF    Episode Statistic Recent Count 0    Episode Statistic Type Category VT    Episode Statistic Vendor Type Category NSVT    Episode Statistic Recent Count 0    Episode Statistic Type Category VF    Episode Statistic Vendor Type Category VF    Date Time Interrogation Session [id number]    Narrative    Patient seen on 4E for evaluation and iterative programming of their ICD  per MD orders.     Device: [employer] D012 INOGEN MINI  Normal device function  Mode: DDD  bpm  AP: <1%  : <1%  Intrinsic rhythm: NSR 68 bpm  Lead Trends Appear Stable  Estimated battery longevity to RRT = 1 years  Atrial Arrhythmia: 55 AHR episodes recorded during procedure due to noise on the atrial lead from electrocautery.  Ventricular Arrhythmia: 0  Setting Changes: ICD tachy therapies programmed back on. LRL decreased from 60 to 50 bpm. RV amplitude increased to 2.4 V. Programming changes made per ICU MD orders.    PALOMA Kenney RN    Dual lead ICD    I have reviewed and interpreted the device interrogation, settings, programming and nurse's summary. The device is functioning within normal device parameters. I agree with the current findings, assessment and plan.       =========================================

## 2021-06-16 NOTE — PROGRESS NOTES
"[facility] Nerve Block Daily Progress Note  06/16/21    24 Hour Events  - Remains intubated, on fentanyl drip, sedation weaned off this morning.  No acute events overnight  - Chest tubes x 3  - Has not been OOB postop  - Diet: NPO    Subjective  - Arouses, denies pain when asked  - Tolerating nerve block catheter infusion and fentanyl drip (see below)  -  Moves extremities.     Objective  Exam  Vitals:   BP (!) 145/98   Pulse 82   Temp 99.7  F (37.6  C)   Resp 16   Ht 1.575 m (5' 2\")   Wt 59.7 kg (131 lb 9.8 oz)   SpO2 98%   BMI 24.07 kg/m        General: Intubated, NAD  MSK/Neuro: moves upper and lower extremities.    Skin: bilateral erector spinae (ES) catheter sites with dressings c/d/i, no tenderness, erythema, heme, edema    Assessment  Racquel Emmanuel is a 86 year old female with PMH of 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 Smith on 6/15/2021 (POD# 1).  Prior to surgery RAPS placed bilateral T4-5 erector spinae (ES) catheters for analgesia. Pain adequately controlled with multimodal analgesia. Motor function intact and no objective evidence of adverse side effects related to local anesthetic continuous infusion.     Plan  - Catheter Day #1 bilateral T4-5 ES catheters/infusion:    Continue Ropivacaine 0.2% PIB (programmed intermittent bolus) infusion at 7 mL Q 60 min via each catheter, total infusion 14 mL/hr, plan to maintain catheters while chest tubes in place, max of 7 days      Patient can be evaluated to receive local anesthetic bolus if needed Q 12 hr for pain, bedside RN should page RAPS to request bolus    - Antithrombotics/Thrombolytics ordered: heparin ANTICOAGULANT injection 5,000 Units, " SC, Q8H    Okay to continue Heparin SQ as ordered by primary service  o Please contact RAPS jobcode pager [phone] before ordering or making any medication changes    RAPS will continue to follow and adjust as needed  Discussed with attending anesthesiologist    Analgesic Medications ordered:  Medications related to Pain Management (From now, onward)    Start     Dose/Rate Route Frequency Ordered Stop    06/18/21 0000  acetaminophen (TYLENOL) tablet 650 mg      650 mg Oral EVERY 4 HOURS PRN 06/15/21 1717 06/16/21 1200  aspirin (ASA) chewable tablet 81 mg      81 mg Oral or Feeding Tube DAILY 06/16/21 1147      06/16/21 0830  docusate (COLACE) 50 MG/5ML liquid 100 mg      100 mg Oral or Feeding Tube 2 TIMES DAILY 06/16/21 0819      06/16/21 0800  polyethylene glycol (MIRALAX) Packet 17 g      17 g Oral DAILY 06/15/21 1717      06/15/21 2000  senna-docusate (SENOKOT-S/PERICOLACE) 8.6-50 MG per tablet 1 tablet      1 tablet Oral 2 TIMES DAILY 06/15/21 1717      06/15/21 2000  dexmedetomidine (PRECEDEX) 400 mcg in 0.9% sodium chloride 100 mL      0.2-0.7 mcg/kg/hr × 58.1 kg (Dosing Weight)  2.9-10.2 mL/hr  Intravenous CONTINUOUS 06/15/21 1948      06/15/21 1730  ropivacaine 0.2% in NS perineural infusion simple       Perineural Continuous Nerve Block 06/15/21 1717      06/15/21 1730  ropivacaine 0.2% in NS perineural infusion simple       Perineural Continuous Nerve Block 06/15/21 1717      06/15/21 1730  acetaminophen (TYLENOL) tablet 975 mg      975 mg Oral EVERY 8 HOURS 06/15/21 1717 06/18/21 1729    06/15/21 1730  fentaNYL (SUBLIMAZE) infusion      25-50 mcg/hr  0.5-1 mL/hr  Intravenous CONTINUOUS 06/15/21 1718      06/15/21 1730  propofol (DIPRIVAN) infusion      5-75 mcg/kg/min × 58.1 kg (Dosing Weight)  1.7-26.1 mL/hr  Intravenous CONTINUOUS 06/15/21 1718      06/15/21 1716  oxyCODONE IR (ROXICODONE) half-tab 2.5 mg      2.5 mg Oral EVERY 4 HOURS PRN 06/15/21 1717      06/15/21 1716  oxyCODONE (ROXICODONE)  tablet 5 mg      5 mg Oral EVERY 4 HOURS PRN 06/15/21 1717      06/15/21 1716  diphenhydrAMINE (BENADRYL) solution 12.5 mg      12.5 mg Oral EVERY 6 HOURS PRN 06/15/21 1717      06/15/21 1716  diphenhydrAMINE (BENADRYL) injection 12.5 mg      12.5 mg Intravenous EVERY 6 HOURS PRN 06/15/21 1717      06/15/21 1716  magnesium hydroxide (MILK OF MAGNESIA) suspension 30 mL      30 mL Oral DAILY PRN 06/15/21 1717      06/15/21 1716  bisacodyl (DULCOLAX) Suppository 10 mg      10 mg Rectal DAILY PRN 06/15/21 1717      06/15/21 1716  sodium phosphate (FLEET ENEMA) 1 enema      1 enema Rectal ONCE PRN 06/15/21 1717      06/15/21 1716  HYDROmorphone (DILAUDID) injection 0.2 mg      0.2 mg Intravenous EVERY 2 HOURS PRN 06/15/21 1717      06/15/21 1716  methocarbamol (ROBAXIN) tablet 500 mg      500 mg Oral EVERY 6 HOURS PRN 06/15/21 1717      06/15/21 1716  lidocaine 1 % 0.1-1 mL      0.1-1 mL Other EVERY 1 HOUR PRN 06/15/21 1717      06/15/21 1716  lidocaine (LMX4) cream       Topical EVERY 1 HOUR PRN 06/15/21 1717                Labs/Diagnostics  Heme/INR:  Recent Labs   Lab Test 06/16/21  0302 06/15/21  1929   WBC 24.4* 21.4*   RBC 4.22 3.99   HGB 13.1 12.5   HCT 38.5 35.3   MCV 91 89   MCH 31.0 31.3   MCHC 34.0 35.4   RDW 17.5* 16.6*    172       Lab Results   Component Value Date    INR 1.26 06/15/2021    INR 1.44 06/15/2021    INR 1.86 06/15/2021         ---------------------------  [facility]  [facility]  6/16/2021 12:08 PM    RAPS Contact Info (24 hour job code pager is the last 4 digits) For in-house use only:   Job code ID: [place] [id number]   [place] 0599  Peds [phone]  [employer] phone: dial * * * [phone], enter jobcode ID, then enter call-back number.    Text: Use [employer] on the Intranet <Paging/Directory> tab and enter Jobcode ID.   If no call back at any time, contact the hospital  and ask for RAPS attending or backup

## 2021-06-16 NOTE — PLAN OF CARE
Pt received from OR at approx 5pm on min sedation and pressors. Warming prior to wake up with zaira churchill. Will assess and resume sedation throughout overnight. Minimal CT output. UO ok.  See flowsheets for data.

## 2021-06-16 NOTE — OP NOTE
Procedure Date: 06/15/2021    PREOPERATIVE DIAGNOSES:    1.  Ascending aortic aneurysm.  2.  Aortic regurgitation, status post aortic valve replacement.  3.  Chronic renal insufficiency.    POSTOPERATIVE DIAGNOSES:  1.  Ascending aortic aneurysm.  2.  Aortic regurgitation, status post aortic valve replacement.  3.  Chronic renal insufficiency.    PROCEDURE:   1.  Redo median sternotomy.  2.  Repair of ascending aortic aneurysm with 32 mm Hemashield platinum graft under deep hypothermic circulatory arrest.  3.  Peripheral cardiopulmonary bypass via the right femoral artery and vein cannulation.    NOTE: an additional 2-3 hours of operating time and required for this operation because of complexity of the operation as the patient had an extremely large aortic pseudoaneurysm with previous open heart surgery.    SURGEON:  Gokul Smith M.D.    COSURGEON:  Dr. Magnolia Jordan M.D. Note, the second attending surgeon was required for this operation because of complexity of the operation as the patient was elderly and had an extremely large pseudoaneurysm that was adjacent to the posterior wall of the sternum as well as had recent open heart surgery.     ASSISTANT:  Elenita Rivera.    INDICATIONS FOR PROCEDURE:  The patient is an 86-year-old female with a large pseudoaneurysm of the ascending aorta that was compressing the superior vena cava and in the distal right pulmonary artery as well as the right atrium.  A decision was made to proceed with high risk ascending aortic aneurysm repair.  I discussed the risks and benefits of surgery, the patient's family including risks of death, stroke, bleeding, wound infection, renal failure, arrhythmias.  She understands and wished to proceed with surgery.    OPERATIVE FINDINGS:  The patient's sternum was of adequate quality and may be mildly osteoporotic.  There were moderate adhesions.  There was extensive large ascending aortic aneurysm.  There is also significant calcification in  the ascending aorta.  The aortic valve looked grossly normal.    DESCRIPTION OF PROCEDURE:  The patient was brought to the operating room in stable condition for the administered general anesthesia, the patient's chest, abdomen, lower extremities prepped and draped in the usual sterile manner.  A right inguinal incision was made in the right femoral artery and vein were identified and proximal and distal vascular control obtained.  Heparin was administered in order for preparation for cardiopulmonary bypass with a CT-guided heparinization and the admission of [facility].  A 17-Jordanian arterial cannula was inserted in the right femoral artery, 25-Jordanian venous cannula was inserted in the right femoral vein.  Full cardiopulmonary bypass was initiated and cooling was begun.  A redo sternotomy was carefully performed with an oscillating saw. The adhesions were carefully dissected to expose the ascending aorta and the right atrium.  A retrograde cardioplegia cannula placed.  We placed a vent in the right pulmonary artery.  We dissected around the very distal ascending aorta and proximal arch.  The ascending aorta was cross clamped. At this point, we cooled the patient to 24 degrees C.  Two liters of cold blood cardioplegia administered by the retrograde route as well as with handheld cannula through the coronary ostia at a later period.  The mid portion of the ascending aorta was excised and the aneurysm and the pseudoaneurysm was opened into.  We excised the distal ascending aorta up to the clamp.  It was noted there was significant calcification around the clamp and we decided to do the procedure under circulatory arrest.  The clamp was removed and circulatory arrest was started.  We gave antegrade cerebral blood through a cannula placed in the ostium of the innominate artery.  Bovine pericardium strips were fashioned and placed around the inside and outside of the distal aortic suture margin, which was flushed with the  innominate take off.  A 32 mm Hemashield graft was sewn end-to-side to this with continuous 4-0 Prolene suture.  We then checked hemostasis at this by placing the catheter clamped distally and the graft.  The clamp was then repositioned proximal to the anastomosis.  Rewarming was begun.  We then turned our attention to the proximal aortic margin.  This had to be taken down even proximal to the previous aortotomy suture line as there was a large pseudoaneurysm involving the posterior wall of the aorta.  We had to carefully dissect off the roof of the left atrium off the proximal ascending aorta/root as well as portion of the right atrium.  Once this was achieved, we placed a bovine pericardial strip around  the proximal aortic margin and sewed the graft in  end-to-side to the proximal aortic margin with continuous 4-0 Prolene suture.  We then placed a root vent and the graft.  Warm dose of retrograde hot shot was given and with high suction on both vents crossclamp was released.  Organized rhythm resumed without the need for defibrillations.  Rewarming and reperfusion allowed.  During the period of rewarming, we noticed bleeding from the roof of left atrium, which was closed with interrupted 4-0 Prolene pledgeted sutures.  The patient was gradually weaned off cardiopulmonary bypass with low dose epinephrine for a total cardiopulmonary bypass LV function within normal limits.  There was normal functioning of the  prosthetic valve with mild valvular leak that was known previously.  We gave several units of fresh frozen plasma, platelets Kcentra to facilitate hemostasis.  Final hemostasis was achieved.  Anterior mediastinal chest tube and 1 left pleural chest tube placed.  Sternum was closed with surgical steel wires.  Two right ventricular pacing wire was also placed.  Fascia, subcutaneous tissue, skin of chest were closed in layers. Cannulas from the groin were all removed prior to administration of protamine and the  right femoral artery and vein repaired.  Groin wound was closed in layers.  The patient transferred to ICU in stable critical condition.    Gokul Smith MD        D: 2021   T: 2021   MT: karlene    Name:     ABRAM BALDERAS  MRN:      [id number]        Account:        [id number]   :      1935           Procedure Date: 06/15/2021     Document: [id number]

## 2021-06-16 NOTE — PROGRESS NOTES
CLINICAL NUTRITION SERVICES - BRIEF NOTE    Received provider consult for nutrition education with comments post op cardiovascular surgery (automatic consult on post-op order set). S/p redo sternotomy, ascending aortic aneurysm repair on 6/15. Nutrition education not indicated.    RD will follow per LOS protocol or if re-consulted.     Shayna Noble RD, LD  [id number]  Pgr: [phone]

## 2021-06-16 NOTE — PLAN OF CARE
Major Shift Events:  Patient pressure support trial done. Did not tolerate. Resp 40-60s. Patient following commands, alert. Sedation was off for most of today. Propofol and dex back on for night. Fent. Off. Gave 750 fluid bolus. Rechecked lactic x2 improved see labs. Weaned off norepi. Patient remains on epi. Pt A-line dampened ok by team to use cuff pressures. A-line will stay in place for blood draws. Family at bedside asked many questions.   Plan: Plan to awaken and pressure support in the AM.  For vital signs and complete assessments, please see documentation flowsheets.

## 2021-06-16 NOTE — PLAN OF CARE
OT 4E: Cancel - OT/CR orders received. Pt remains intubated/sedated and not appropriate to initiate therapies, will reschedule.

## 2021-06-16 NOTE — PLAN OF CARE
Major shift events: Patient able to follow commands and move all extremities when sedation weaned. PERRL 2. SR 80s w/occasional PVCs. Maintained MAP > 65 on levo and epi. Weaning pressors as tolerated. 500 ml LR bolus given for elevated lactic. 140 ml combined chest tube output since midnight. FiO2 weaned to 40% trending ABGs. Vent settings unchanged. Bloody sputum slowly clearing up. Lungs clear throughout. 20-40 ml/hr urine output through perez. OG in place with minimal residual. Pain controlled with fentanyl gtt and paravertebral blocks. BG controlled on insulin gtt. K+, phos, and Ca+ replaced. Tmax 100.2 and trending down. Plan to work towards extubation and wean pressors as able. Continue to monitor patient and update team with further changes. See flowsheet for details and assessment.

## 2021-06-17 ENCOUNTER — APPOINTMENT (OUTPATIENT)
Dept: GENERAL RADIOLOGY | Facility: CLINIC | Age: 86
DRG: 220 | End: 2021-06-17
Attending: SURGERY
Payer: MEDICARE

## 2021-06-17 LAB
ALBUMIN SERPL-MCNC: 2 G/DL (ref 3.4–5)
ALP SERPL-CCNC: 37 U/L (ref 40–150)
ALT SERPL W P-5'-P-CCNC: 26 U/L (ref 0–50)
ANION GAP SERPL CALCULATED.3IONS-SCNC: 4 MMOL/L (ref 3–14)
ANION GAP SERPL CALCULATED.3IONS-SCNC: 9 MMOL/L (ref 3–14)
AST SERPL W P-5'-P-CCNC: 22 U/L (ref 0–45)
BASE EXCESS BLDA CALC-SCNC: 1.7 MMOL/L
BASE EXCESS BLDV CALC-SCNC: 1.9 MMOL/L
BASE EXCESS BLDV CALC-SCNC: 2.2 MMOL/L
BILIRUB SERPL-MCNC: 0.9 MG/DL (ref 0.2–1.3)
BUN SERPL-MCNC: 32 MG/DL (ref 7–30)
BUN SERPL-MCNC: 36 MG/DL (ref 7–30)
CA-I BLD-MCNC: 4.4 MG/DL (ref 4.4–5.2)
CALCIUM SERPL-MCNC: 7.6 MG/DL (ref 8.5–10.1)
CALCIUM SERPL-MCNC: 7.7 MG/DL (ref 8.5–10.1)
CHLORIDE SERPL-SCNC: 108 MMOL/L (ref 94–109)
CHLORIDE SERPL-SCNC: 114 MMOL/L (ref 94–109)
CO2 SERPL-SCNC: 25 MMOL/L (ref 20–32)
CO2 SERPL-SCNC: 28 MMOL/L (ref 20–32)
CREAT SERPL-MCNC: 2.01 MG/DL (ref 0.52–1.04)
CREAT SERPL-MCNC: 2.08 MG/DL (ref 0.52–1.04)
ERYTHROCYTE [DISTWIDTH] IN BLOOD BY AUTOMATED COUNT: 18 % (ref 10–15)
GFR SERPL CREATININE-BSD FRML MDRD: 21 ML/MIN/{1.73_M2}
GFR SERPL CREATININE-BSD FRML MDRD: 22 ML/MIN/{1.73_M2}
GLUCOSE BLDC GLUCOMTR-MCNC: 104 MG/DL (ref 70–99)
GLUCOSE BLDC GLUCOMTR-MCNC: 116 MG/DL (ref 70–99)
GLUCOSE BLDC GLUCOMTR-MCNC: 119 MG/DL (ref 70–99)
GLUCOSE BLDC GLUCOMTR-MCNC: 132 MG/DL (ref 70–99)
GLUCOSE BLDC GLUCOMTR-MCNC: 142 MG/DL (ref 70–99)
GLUCOSE BLDC GLUCOMTR-MCNC: 148 MG/DL (ref 70–99)
GLUCOSE BLDC GLUCOMTR-MCNC: 148 MG/DL (ref 70–99)
GLUCOSE BLDC GLUCOMTR-MCNC: 91 MG/DL (ref 70–99)
GLUCOSE BLDC GLUCOMTR-MCNC: 92 MG/DL (ref 70–99)
GLUCOSE BLDC GLUCOMTR-MCNC: 92 MG/DL (ref 70–99)
GLUCOSE BLDC GLUCOMTR-MCNC: 93 MG/DL (ref 70–99)
GLUCOSE BLDC GLUCOMTR-MCNC: 98 MG/DL (ref 70–99)
GLUCOSE SERPL-MCNC: 125 MG/DL (ref 70–99)
GLUCOSE SERPL-MCNC: 143 MG/DL (ref 70–99)
HCO3 BLD-SCNC: 26 MMOL/L (ref 21–28)
HCO3 BLDV-SCNC: 27 MMOL/L (ref 21–28)
HCO3 BLDV-SCNC: 28 MMOL/L (ref 21–28)
HCT VFR BLD AUTO: 30.4 % (ref 35–47)
HGB BLD-MCNC: 10.3 G/DL (ref 11.7–15.7)
LACTATE BLD-SCNC: 0.9 MMOL/L (ref 0.7–2)
MAGNESIUM SERPL-MCNC: 2.7 MG/DL (ref 1.6–2.3)
MCH RBC QN AUTO: 31.2 PG (ref 26.5–33)
MCHC RBC AUTO-ENTMCNC: 33.9 G/DL (ref 31.5–36.5)
MCV RBC AUTO: 92 FL (ref 78–100)
O2/TOTAL GAS SETTING VFR VENT: 40 %
O2/TOTAL GAS SETTING VFR VENT: 40 %
O2/TOTAL GAS SETTING VFR VENT: ABNORMAL %
OXYHGB MFR BLD: 95 % (ref 92–100)
OXYHGB MFR BLDV: 39 %
OXYHGB MFR BLDV: 56 %
PCO2 BLD: 41 MM HG (ref 35–45)
PCO2 BLDV: 42 MM HG (ref 40–50)
PCO2 BLDV: 51 MM HG (ref 40–50)
PF4 HEPARIN CMPLX AB SER QL: NEGATIVE
PH BLD: 7.42 PH (ref 7.35–7.45)
PH BLDV: 7.35 PH (ref 7.32–7.43)
PH BLDV: 7.42 PH (ref 7.32–7.43)
PHOSPHATE SERPL-MCNC: 5.1 MG/DL (ref 2.5–4.5)
PLATELET # BLD AUTO: 84 10E9/L (ref 150–450)
PO2 BLD: 88 MM HG (ref 80–105)
PO2 BLDV: 24 MM HG (ref 25–47)
PO2 BLDV: 33 MM HG (ref 25–47)
POTASSIUM SERPL-SCNC: 3.3 MMOL/L (ref 3.4–5.3)
POTASSIUM SERPL-SCNC: 4.6 MMOL/L (ref 3.4–5.3)
PROT SERPL-MCNC: 4.3 G/DL (ref 6.8–8.8)
RBC # BLD AUTO: 3.3 10E12/L (ref 3.8–5.2)
SODIUM SERPL-SCNC: 141 MMOL/L (ref 133–144)
SODIUM SERPL-SCNC: 146 MMOL/L (ref 133–144)
WBC # BLD AUTO: 15.3 10E9/L (ref 4–11)

## 2021-06-17 PROCEDURE — 258N000003 HC RX IP 258 OP 636: Performed by: STUDENT IN AN ORGANIZED HEALTH CARE EDUCATION/TRAINING PROGRAM

## 2021-06-17 PROCEDURE — 250N000013 HC RX MED GY IP 250 OP 250 PS 637: Performed by: SURGERY

## 2021-06-17 PROCEDURE — 83735 ASSAY OF MAGNESIUM: CPT | Performed by: STUDENT IN AN ORGANIZED HEALTH CARE EDUCATION/TRAINING PROGRAM

## 2021-06-17 PROCEDURE — 999N000157 HC STATISTIC RCP TIME EA 10 MIN

## 2021-06-17 PROCEDURE — 71045 X-RAY EXAM CHEST 1 VIEW: CPT | Mod: 26 | Performed by: RADIOLOGY

## 2021-06-17 PROCEDURE — 250N000013 HC RX MED GY IP 250 OP 250 PS 637: Performed by: NURSE PRACTITIONER

## 2021-06-17 PROCEDURE — 250N000011 HC RX IP 250 OP 636: Performed by: STUDENT IN AN ORGANIZED HEALTH CARE EDUCATION/TRAINING PROGRAM

## 2021-06-17 PROCEDURE — 80048 BASIC METABOLIC PNL TOTAL CA: CPT | Performed by: SURGERY

## 2021-06-17 PROCEDURE — 85027 COMPLETE CBC AUTOMATED: CPT | Performed by: STUDENT IN AN ORGANIZED HEALTH CARE EDUCATION/TRAINING PROGRAM

## 2021-06-17 PROCEDURE — 200N000002 HC R&B ICU UMMC

## 2021-06-17 PROCEDURE — 83605 ASSAY OF LACTIC ACID: CPT | Performed by: STUDENT IN AN ORGANIZED HEALTH CARE EDUCATION/TRAINING PROGRAM

## 2021-06-17 PROCEDURE — 82805 BLOOD GASES W/O2 SATURATION: CPT | Performed by: STUDENT IN AN ORGANIZED HEALTH CARE EDUCATION/TRAINING PROGRAM

## 2021-06-17 PROCEDURE — 250N000009 HC RX 250: Performed by: STUDENT IN AN ORGANIZED HEALTH CARE EDUCATION/TRAINING PROGRAM

## 2021-06-17 PROCEDURE — 82330 ASSAY OF CALCIUM: CPT | Performed by: STUDENT IN AN ORGANIZED HEALTH CARE EDUCATION/TRAINING PROGRAM

## 2021-06-17 PROCEDURE — 250N000013 HC RX MED GY IP 250 OP 250 PS 637: Performed by: STUDENT IN AN ORGANIZED HEALTH CARE EDUCATION/TRAINING PROGRAM

## 2021-06-17 PROCEDURE — 999N001017 HC STATISTIC GLUCOSE BY METER IP

## 2021-06-17 PROCEDURE — 86022 PLATELET ANTIBODIES: CPT | Performed by: STUDENT IN AN ORGANIZED HEALTH CARE EDUCATION/TRAINING PROGRAM

## 2021-06-17 PROCEDURE — 82805 BLOOD GASES W/O2 SATURATION: CPT | Performed by: SURGERY

## 2021-06-17 PROCEDURE — 84100 ASSAY OF PHOSPHORUS: CPT | Performed by: STUDENT IN AN ORGANIZED HEALTH CARE EDUCATION/TRAINING PROGRAM

## 2021-06-17 PROCEDURE — 94003 VENT MGMT INPAT SUBQ DAY: CPT

## 2021-06-17 PROCEDURE — 250N000011 HC RX IP 250 OP 636: Performed by: SURGERY

## 2021-06-17 PROCEDURE — 999N000155 HC STATISTIC RAPCV CVP MONITORING

## 2021-06-17 PROCEDURE — 80053 COMPREHEN METABOLIC PANEL: CPT | Performed by: STUDENT IN AN ORGANIZED HEALTH CARE EDUCATION/TRAINING PROGRAM

## 2021-06-17 PROCEDURE — 71045 X-RAY EXAM CHEST 1 VIEW: CPT

## 2021-06-17 RX ORDER — QUETIAPINE FUMARATE 25 MG/1
25 TABLET, FILM COATED ORAL EVERY EVENING
Status: DISCONTINUED | OUTPATIENT
Start: 2021-06-17 | End: 2021-06-18

## 2021-06-17 RX ORDER — AMINO AC/PROTEIN HYDR/WHEY PRO 10G-100/30
1 LIQUID (ML) ORAL 3 TIMES DAILY
Status: DISCONTINUED | OUTPATIENT
Start: 2021-06-17 | End: 2021-06-18

## 2021-06-17 RX ORDER — HYDRALAZINE HYDROCHLORIDE 20 MG/ML
10 INJECTION INTRAMUSCULAR; INTRAVENOUS EVERY 30 MIN PRN
Status: DISCONTINUED | OUTPATIENT
Start: 2021-06-17 | End: 2021-06-21

## 2021-06-17 RX ORDER — POTASSIUM CHLORIDE 1.5 G/1.58G
60 POWDER, FOR SOLUTION ORAL ONCE
Status: COMPLETED | OUTPATIENT
Start: 2021-06-17 | End: 2021-06-17

## 2021-06-17 RX ORDER — POTASSIUM CHLORIDE 1.5 G/1.58G
20 POWDER, FOR SOLUTION ORAL ONCE
Status: COMPLETED | OUTPATIENT
Start: 2021-06-17 | End: 2021-06-17

## 2021-06-17 RX ORDER — FUROSEMIDE 10 MG/ML
40 INJECTION INTRAMUSCULAR; INTRAVENOUS ONCE
Status: COMPLETED | OUTPATIENT
Start: 2021-06-17 | End: 2021-06-17

## 2021-06-17 RX ORDER — HEPARIN SODIUM 5000 [USP'U]/.5ML
5000 INJECTION, SOLUTION INTRAVENOUS; SUBCUTANEOUS EVERY 8 HOURS
Status: DISCONTINUED | OUTPATIENT
Start: 2021-06-17 | End: 2021-06-26

## 2021-06-17 RX ORDER — POTASSIUM CHLORIDE 7.45 MG/ML
10 INJECTION INTRAVENOUS
Status: DISCONTINUED | OUTPATIENT
Start: 2021-06-17 | End: 2021-06-17

## 2021-06-17 RX ORDER — FUROSEMIDE 10 MG/ML
20 INJECTION INTRAMUSCULAR; INTRAVENOUS ONCE
Status: COMPLETED | OUTPATIENT
Start: 2021-06-17 | End: 2021-06-17

## 2021-06-17 RX ORDER — DEXTROSE MONOHYDRATE 100 MG/ML
INJECTION, SOLUTION INTRAVENOUS CONTINUOUS PRN
Status: DISCONTINUED | OUTPATIENT
Start: 2021-06-17 | End: 2021-06-26 | Stop reason: HOSPADM

## 2021-06-17 RX ADMIN — PAROXETINE HYDROCHLORIDE 10 MG: 10 TABLET, FILM COATED ORAL at 08:50

## 2021-06-17 RX ADMIN — MUPIROCIN 0.5 G: 20 OINTMENT TOPICAL at 08:51

## 2021-06-17 RX ADMIN — HYDROMORPHONE HYDROCHLORIDE 0.2 MG: 0.2 INJECTION, SOLUTION INTRAMUSCULAR; INTRAVENOUS; SUBCUTANEOUS at 14:29

## 2021-06-17 RX ADMIN — DEXMEDETOMIDINE 0.7 MCG/KG/HR: 100 INJECTION, SOLUTION, CONCENTRATE INTRAVENOUS at 12:17

## 2021-06-17 RX ADMIN — HEPARIN SODIUM 5000 UNITS: 5000 INJECTION, SOLUTION INTRAVENOUS; SUBCUTANEOUS at 23:32

## 2021-06-17 RX ADMIN — OXYCODONE HYDROCHLORIDE 5 MG: 5 TABLET ORAL at 01:09

## 2021-06-17 RX ADMIN — ACETAMINOPHEN 975 MG: 325 TABLET, FILM COATED ORAL at 08:50

## 2021-06-17 RX ADMIN — POTASSIUM CHLORIDE 60 MEQ: 1.5 POWDER, FOR SOLUTION ORAL at 10:24

## 2021-06-17 RX ADMIN — DOCUSATE SODIUM 100 MG: 50 LIQUID ORAL at 08:50

## 2021-06-17 RX ADMIN — ASPIRIN 81 MG CHEWABLE TABLET 81 MG: 81 TABLET CHEWABLE at 08:50

## 2021-06-17 RX ADMIN — HYDRALAZINE HYDROCHLORIDE 10 MG: 20 INJECTION INTRAMUSCULAR; INTRAVENOUS at 23:36

## 2021-06-17 RX ADMIN — HEPARIN SODIUM 5000 UNITS: 5000 INJECTION, SOLUTION INTRAVENOUS; SUBCUTANEOUS at 03:34

## 2021-06-17 RX ADMIN — ACETAMINOPHEN 975 MG: 325 TABLET, FILM COATED ORAL at 03:34

## 2021-06-17 RX ADMIN — DOCUSATE SODIUM 50 MG AND SENNOSIDES 8.6 MG 1 TABLET: 8.6; 5 TABLET, FILM COATED ORAL at 20:28

## 2021-06-17 RX ADMIN — MUPIROCIN 0.5 G: 20 OINTMENT TOPICAL at 20:09

## 2021-06-17 RX ADMIN — POTASSIUM CHLORIDE 20 MEQ: 1.5 POWDER, FOR SOLUTION ORAL at 08:50

## 2021-06-17 RX ADMIN — OXYCODONE HYDROCHLORIDE 5 MG: 5 TABLET ORAL at 08:50

## 2021-06-17 RX ADMIN — EPINEPHRINE 0.06 MCG/KG/MIN: 1 INJECTION PARENTERAL at 03:40

## 2021-06-17 RX ADMIN — Medication 1 PACKET: at 20:09

## 2021-06-17 RX ADMIN — PROPOFOL 15 MCG/KG/MIN: 10 INJECTION, EMULSION INTRAVENOUS at 06:28

## 2021-06-17 RX ADMIN — DOCUSATE SODIUM 50 MG AND SENNOSIDES 8.6 MG 1 TABLET: 8.6; 5 TABLET, FILM COATED ORAL at 08:50

## 2021-06-17 RX ADMIN — ONDANSETRON 4 MG: 4 TABLET, ORALLY DISINTEGRATING ORAL at 23:31

## 2021-06-17 RX ADMIN — QUETIAPINE FUMARATE 25 MG: 25 TABLET ORAL at 20:28

## 2021-06-17 RX ADMIN — DEXMEDETOMIDINE 0.6 MCG/KG/HR: 100 INJECTION, SOLUTION, CONCENTRATE INTRAVENOUS at 06:31

## 2021-06-17 RX ADMIN — OXYCODONE HYDROCHLORIDE 5 MG: 5 TABLET ORAL at 03:35

## 2021-06-17 RX ADMIN — HEPARIN SODIUM 5000 UNITS: 5000 INJECTION, SOLUTION INTRAVENOUS; SUBCUTANEOUS at 15:56

## 2021-06-17 RX ADMIN — Medication 10 MG: at 20:28

## 2021-06-17 RX ADMIN — PANTOPRAZOLE SODIUM 40 MG: 40 TABLET, DELAYED RELEASE ORAL at 08:50

## 2021-06-17 RX ADMIN — FUROSEMIDE 40 MG: 10 INJECTION, SOLUTION INTRAVENOUS at 17:29

## 2021-06-17 RX ADMIN — FUROSEMIDE 20 MG: 10 INJECTION, SOLUTION INTRAMUSCULAR; INTRAVENOUS at 10:24

## 2021-06-17 RX ADMIN — ACETAMINOPHEN 975 MG: 325 TABLET, FILM COATED ORAL at 17:29

## 2021-06-17 ASSESSMENT — ACTIVITIES OF DAILY LIVING (ADL)
ADLS_ACUITY_SCORE: 22

## 2021-06-17 ASSESSMENT — MIFFLIN-ST. JEOR: SCORE: 1023.25

## 2021-06-17 NOTE — PROGRESS NOTES
CV ICU PROGRESS NOTE  June 17, 2021      CO-MORBIDITIES:  Ascending aortic aneurysm (H)  Ascending aortic aneurysm (H)  S/P AVR (22 mm bioprosthetic bovine valve) 6/3/2015 ([place])    Past Medical History:   Diagnosis Date     Anemia 4/5/2021     Antiplatelet or antithrombotic long-term use      Aortic aneurysm (H) 3/24/2021     Aortic insufficiency 3/24/2021     Arthritis      Ascending aortic aneurysm (H)      Clinical diagnosis of COVID-19; 7/2020 4/5/2021     Congestive heart failure (H)      Gastroesophageal reflux disease without esophagitis 4/5/2021     GI bleed; 8/2019 4/5/2021     History of cholecystectomy 4/5/2021     History of lobectomy of lung; 2/13/2017 ([place]) 4/5/2021     History of lung cancer, right middle lobe; 2/2017 4/5/2021     Hyperlipidemia LDL goal <130 4/5/2021     Hypertension      ICD (implantable cardioverter-defibrillator); 2015 4/5/2021     Large B-cell lymphoma 2014 4/5/2021     Macular degeneration 4/5/2021     Nonischemic cardiomyopathy (H) 3/24/2021     MIGUELANGEL (obstructive sleep apnea) 4/5/2021     Osteopenia 4/5/2021     Oxygen dependent     2.5 l via NC at night time only     Pacemaker      Paroxysmal atrial fibrillation (H) 4/5/2021     S/P AVR (22 mm bioprosthetic valve) 3/24/2021     Temporal giant cell arteritis (H) 4/5/2021     ASSESSMENT: Racquel Emmanuel is a 86 year old female with PMH of 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 Smith on 6/15/2021.    TODAY'S PROGRESS:   - Wean Fentanyl, Propofol, and Precedex for extubation  - start seroquel 25mg at bedtime for delirium  - Delirium precautions  - Melatonin at bedtime  - wean epi as able using cuff pressures  - Remove A-line  - SBP goal <120  -  weaned off NE  - Replace K 60meq  - Lasix 20IV + 40IV today  - HIT Screen- negative, Resume SQH  - consider starting tube feeds if unable to extubate      Neuro/pain/sedation:  Post-op pain   Chronic back pain  H/O subdural hematoma (2020), no residual deficits  H/O of delirium  H/O PONV  - PTA paroxetine  - ES catheters in place w/ ropivacaine infusion, RAPS folowing  - scheduled APAP q8h  - Prn methocarbamol / oxycodone/ hydromorphone   - Wean Fentanyl, Propofol, and Precedex for extubation  - Seroquel at bedtime for delirium  - Scheduled melatonin at bedtime  - Delirium precautions     Pulmonary care:   Postoperative respiratory insufficiency  H/O Lung SCC s/p RML lobectomy   MIGUELANGEL  - Mechanically ventilated; extubated 6/17  - Wean SpO2 for O2 sats >92%  - Keep chest tubes -20 cm H2O suction    Cardiovascular:  Non-ischemic cardiomyopathy (EF 55-60% 3/2021)  HFpEF  HTN/HLD  Post-operative cardiogenic vs vasoplegic shock  Paroxysmal atrial fibrillation s/p AICD  Hx Aortic Stenosis s/p bioprosthetic AVR (2015)  Ascending Aortic Aneurysm s/p repair with Dr. Smith 6/15/2021  - goal MAP >65, SBP <120  - Continue hold PTA warfarin, metoprolol, digoxin, losartan, statin, torsemide  - V Pacing wire, her Dual Lead ICD set at DDD 50  - Aspirin daily  - weaned off NE  - wean epi as able using cuff pressures  - Remove A-line, nonfunctional      GI care:  IBS  GERD  Hx GI bleed, resolved  - NPO   - OGT  - bowel regimen  - PPI daily  - Antiemetics (zofran, compazine) PRN, given nausea with opioids  - consider starting tube feeds if unable to extubate    Renal/Fluid, Electrolytes, and nutrition:  CKD (baseline Cr 1.6)  Acute kidney injury  Lactic acidosis, resolved  - Lactate normalized  - Monitor intake and output.  - Trend BMP daily  - Replace K 60meq  - Lasix 20IV + 40IV  - Holding PTA Torsemide    Endocrine:  Stress hyperglycemia  - insulin infusion  - hypoglycemia protocol     ID/ Antibiotics:  Tmax 100.6 overnight,  resolved  - Perioperative antibiotics: clindamycin, vancomycin complete  - Continue mupirocin to complete 5-day course (day 3)  - Trend WBC and fever curve      Heme:  Anemia  H/O B Cell lymphoma  Acute blood loss anemia, EBL 1L  Leukocytosis, improving  Thrombocytopenia, c/f HIT  Pre-op Hgb 10.3, post-op 12.8, now: 10.3  - Diffuse oozing intraop s/p  5u PRBC, 2u FFP, 2u Plt, 650 Cellsaver, 1g Kcentra, 1g Fibryga  - Monitor CBC  - 6/17 HIT Screen- negative, Resume SQH    Prophylaxis:  - SCD  - PPI  - SQH     Lines/ tubes/ drains:  - CVL- Left internal jugular MAC (calcified other side), unable to float jaciel, hands free triple lumen  - left arterial line: removed today  - ETT- removed  - Self  - OGT- removed  - Chest tubes  --- Anterior mediastinal x2  --- Pleural x 2     Disposition:  - CVICU      Vin Gloria, M4     Patient seen, findings and plan discussed with CV ICU staff, Dr. Patel.    Above note reviewed and edited by me.  Hilary Fong,   General Surgery, PGY3  [id number]      ====================================  SUBJECTIVE:  Stable overnight, following commands, responding to stimuli. Apneic with PST. Anxiety. Delirium/hallucinations.    OBJECTIVE:   1. VITAL SIGNS:   Temp:  [97.9  F (36.6  C)-100.6  F (38.1  C)] 97.9  F (36.6  C)  Pulse:  [] 78  Resp:  [16-30] 16  BP: ()/() 110/50  MAP:  [58 mmHg-98 mmHg] 92 mmHg  Arterial Line BP: ()/(42-85) 100/85  FiO2 (%):  [40 %] 40 %  SpO2:  [94 %-100 %] 98 %  Ventilation Mode: (S) CMV/AC  (Continuous Mandatory Ventilation/ Assist Control)  FiO2 (%): 40 %  Rate Set (breaths/minute): 16 breaths/min  Tidal Volume Set (mL): 400 mL  PEEP (cm H2O): 5 cmH2O  Pressure Support (cm H2O): 10 cmH2O  Oxygen Concentration (%): 40 %  Resp: 16    2. INTAKE/ OUTPUT:   I/O last 3 completed shifts:  In: 1838.73 [I.V.:818.73; NG/GT:270; IV Piggyback:750]  Out: 1333 [Urine:813; Chest Tube:520]    3. PHYSICAL EXAMINATION:   General: NAD  Neuro: sedated,  responding to verbal stimuli, following commands; mild unequal pupils  Resp: mechanically ventilated  CV: RRR, non-cyanotic  Chest tubes to suction, serosanguinous output, no air leak  Incisions: c/d/i  Abdomen: Soft, Non-distended  Self yoselyn urine  Extremities: warm and well perfused    4. INVESTIGATIONS:   Arterial Blood Gases   Recent Labs   Lab 06/17/21 0441 06/16/21 0302 06/16/21  0025 06/15/21  1929   PH 7.42 7.32* 7.32* 7.37   PCO2 41 44 43 42   PO2 88 137* 107* 217*   HCO3 26 23 22 24     Complete Blood Count   Recent Labs   Lab 06/17/21  0441 06/16/21  0302 06/15/21  1929 06/15/21  1710   WBC 15.3* 24.4* 21.4* 15.5*   HGB 10.3* 13.1 12.5 12.8   PLT 84* 196 172 191     Basic Metabolic Panel  Recent Labs   Lab 06/17/21 0441 06/16/21  1428 06/16/21  0302 06/15/21  1929 06/15/21  1710     --  144 145* 146*   POTASSIUM 3.3*  --  3.9 3.6 3.2*   CHLORIDE 108  --  109 110* 110*   CO2 25  --  24 24 24   BUN 32*  --  24 20 18   CR 2.08*  --  1.48* 1.08* 1.01   * 109* 172* 104* 149*     Liver Function Tests  Recent Labs   Lab 06/17/21  0441 06/16/21  0302 06/15/21  2253 06/15/21  1710 06/15/21  1513   AST 22 52*  --  Canceled, Test credited  --    ALT 26 48  --  37  --    ALKPHOS 37* 35*  --  30*  --    BILITOTAL 0.9 0.5  --  1.2  --    ALBUMIN 2.0* 2.5*  --  2.3*  --    INR  --   --  1.26* 1.44* 1.86*     Pancreatic Enzymes  No lab results found in last 7 days.  Coagulation Profile  Recent Labs   Lab 06/15/21  2253 06/15/21  1710 06/15/21  1513   INR 1.26* 1.44* 1.86*   PTT  --  119* >240*     5. RADIOLOGY:   Recent Results (from the past 24 hour(s))   XR Chest Port 1 View    Narrative    EXAM: XR CHEST PORT 1 VIEW  6/17/2021 1:00 AM      HISTORY: Post Op CVTS Surgery    COMPARISON: Previous day    TECHNIQUE: AP chest radiograph    FINDINGS:   Postoperative chest, stable support devices. Trachea is midline.  Cardiomediastinal silhouette is enlarged, stable. Stable bibasilar  pulmonary opacities.  Stable intersitial and scattered airspace  opacities. No pleural effusions. No pneumothorax. No acute abdominal  pathology.      Impression    IMPRESSION:  1.  Stable pulmonary opacities, likely pulmonary interstitial edema  versus atelectasis, less likely infection.  2.  Stable bibasilar opacities, likely atelectasis, less likely  infectious consolidation.    I have personally reviewed the examination and initial interpretation  and I agree with the findings.    BRAYAN ORTIZ MD       =========================================

## 2021-06-17 NOTE — PLAN OF CARE
Major Shift Events: pt extubated 1345 to nasal cannula 4L. A&Ox4, following all commands. Afebrile. Precedex for anxiety, currently off. Epi weaned off. Lasix given twice, UOP ~70ml/hr. Passed bedside swallow, advanced to clears. No BM this shift. Up to chair once.  and Daughter updated by RN and MD at bedside.   Plan: Continue to monitor respiratory status, pulmonary toileting, advance diet as tolerated and work on rehab. Notify MD with any concerns.   For vital signs and complete assessments, please see documentation flowsheets.

## 2021-06-17 NOTE — PLAN OF CARE
OT/4E: Cancel. OT/CR orders received and appreciated. Pt preoccupied w/ medical/respiratory needs today. Will reschedule OT eval for tomorrow.

## 2021-06-17 NOTE — PLAN OF CARE
Nights:  DARRYL  Pt comfortably sedated overnight.  Perlla, follows commands and responds to light stimuli. HR 80's, MAP 70's.  Epi @ 0.05mcg/kg/min.  Lungs clear and afebrile.  Urine 30cc/hr.  No bm yet.  CMV 40%/16/400/5.  Pt of propofol and Dex for sedation.  BG < 150 with insulin gtt.  Lactic is normal this am.    P:  Pressure support and work toward extubation.  Update MD with changes.

## 2021-06-17 NOTE — PROGRESS NOTES
"SPIRITUAL HEALTH SERVICES  SPIRITUAL ASSESSMENT Progress Note  [facility] ([place]) 4E     REFERRAL SOURCE: I had seen pt before her surgery on Tuesday. This was a follow-up  visit with pt, daughter and  on 4E.    I visited with pt's daughter briefly in morning while pt still intubated, with pt and  in afternoon after pt extubated. Pt said \"it feels so much better!\" Pt quite tired, but said \"they want me to stay awake for a while, so I'm trying hard to not sleep...\" Pt and  grateful that surgery went well, hopeful for continued healing.  Prayer was welcomed.    PLAN: continue to follow while pt on unit.    Delroy Thompson) Soy Rodriguez M.Div., [facility]  Staff   Pager [phone]      * [facility] remains available 24/7 for emergent requests/referrals, either by having the switchboard page the on-call  or by entering an ASAP/STAT consult in Epic (this will also page the on-call ). Routine Epic consults receive an initial response within 24 hours.*      "

## 2021-06-17 NOTE — PROGRESS NOTES
"CLINICAL NUTRITION SERVICES - ASSESSMENT NOTE     Nutrition Prescription    Malnutrition Status:    Pt does not meet criteria for malnutrition     Interventions ordered by Registered Dietitian (RD):  - Osmolite @ 15 ml/hr via OGT. Pending electrolytes, adv by 15 ml q8h to eventual goal @ 45 ml/hr + Prosource (3 pkts/day)   - FWF of 30 ml q4h for tube patency   - Multivitamin w/ minerals daily    Goal TF provisions: Osmolite 1.5 @ 45ml/hr (1080ml/day) + Prosource (1 pkt TID) will provide: 1740 kcals (30 kcal/kg), 100 g PRO (1.7 g/kg), 822 ml free H20, 219 g CHO, and 0 g fiber daily.       REASON FOR ASSESSMENT  Racquel Emmanuel is a 86 year old female assessed by dietitian for Provider Order - RD to Assess and Order TF. 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 Presented with ascending aortic aneurysm s/p redo sternotomy ascending aortic aneurysm repair on 6/15.    NUTRITION HISTORY  Unable to obtain nutrition history from pt.     CURRENT NUTRITION ORDERS  Diet: NPO x 2 days (intubated)     LABS  Labs reviewed  K 3.3   Cr 2.08     MEDICATIONS  Medications reviewed  Insulin gtt   Lasix 20 mg   KCl pkt, 60 mEq  Bowel regimen     ANTHROPOMETRICS  Height: 157.5 cm (5' 2\")  Most Recent Weight: 63 kg (138 lb 14.2 oz)    IBW: 50 kg  BMI: Normal BMI based on admit wt.  Weight History: Admitted at 128 lb (58.1 kg), will use for dosing weight. Has been stable near ~130 since April.  Wt Readings from Last 10 Encounters:   06/17/21 63 kg (138 lb 14.2 oz)   06/03/21 59 kg (130 lb)   05/10/21 59 kg (130 lb)   04/05/21 59.3 kg (130 lb 12.8 oz)       Dosing Weight: 58 kg (actual, based on admit 6/15)     ASSESSED NUTRITION NEEDS  Estimated Energy Needs: 1450 - 1750 kcals/day (25 - 30 kcals/kg)  Justification: Maintenance  Estimated Protein Needs: 75 - " 105 grams protein/day (1.3 - 1.8 grams of pro/kg)  Justification: Increased needs post-op  Estimated Fluid Needs: (1 mL/kcal)   Justification: Maintenance    PHYSICAL FINDINGS  See malnutrition section below.    MALNUTRITION  % Intake: Unable to assess intake PTA  \% Weight Loss: None noted  Subcutaneous Fat Loss: None observed  Muscle Loss: Does not meet criteria, muscle loss appears consistent with age.  Fluid Accumulation/Edema: None noted  Malnutrition Diagnosis: Patient does not meet two of the established criteria necessary for diagnosing malnutrition    NUTRITION DIAGNOSIS  Inadequate protein-energy intake related to NPO (intubated) as evidenced by currently meeting 0% protein-energy needs.      INTERVENTIONS  See interventions at top of progress note.     Goals  Total avg nutritional intake to meet a minimum of 25 kcal/kg and 1.3 g PRO/kg daily (per dosing wt 58 kg).     Monitoring/Evaluation  Progress toward goals will be monitored and evaluated per protocol.  Shayna Noble RD, LD  [id number]  Pgr: [phone]

## 2021-06-17 NOTE — PROGRESS NOTES
[facility], Procedure Note          Extubation:       Racquel Emmanuel  MRN# [id number]   June 17, 2021, 2:00 PM         Patient extubated at: June 17, 2021, 2:00 PM   Supplemental Oxygen: Via nasal cannula at 4 liters per minute   Cough: The cough is good   Secretion Mode: Able to clear   Secretion Amount: Small amount, moderately thick and white / yellow in color   Respiratory Exam:: Breath sounds: equal and clear     Location: bilaterally   Skin Exam:: Patient color: natural   Patient Status: Currently appears comfortable   Arterial Blood Gasses: pH Arterial (pH)   Date Value   06/17/2021 7.42     pO2 Arterial (mm Hg)   Date Value   06/17/2021 88     pCO2 Arterial (mm Hg)   Date Value   06/17/2021 41     Bicarbonate Arterial (mmol/L)   Date Value   06/17/2021 26            Recorded by Laura Oneal

## 2021-06-17 NOTE — PHARMACY-CONSULT NOTE
Pharmacy Tube Feeding Consult    Medication reviewed for administration by feeding tube and for potential food/drug interactions.    Recommendation: No changes are needed at this time.     Pharmacy will continue to follow as new medications are ordered.    Ervin FelixD  CVICU and Advanced Heart Failure Pharmacist  Pager [phone]

## 2021-06-17 NOTE — PROGRESS NOTES
REGIONAL ANESTHESIA PAIN SERVICE CONTINUOUS NERVE INFUSION NOTE  June 17, 2021    Racquel Emmanuel is a 86 year old female with history of 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 Smith on 6/15/2021.  Prior to surgery RAPS placed bilateral T4-5 erector spinae (ES) catheters on 6/15/21 for analgesia.       Subjective and Interval History: Overnight events: failed pressure support trial.  Seen at 1230.  Patient is still intubated and sedated.  Failed pressure support trial this morning as well.    Antithrombotic/Thrombolytic Therapy ordered:  heparin ANTICOAGULANT injection 5,000 Units, SC, Q8H    Analgesic Medications:   Medications related to Pain Management (From now, onward)    Start     Dose/Rate Route Frequency Ordered Stop    06/18/21 0000  acetaminophen (TYLENOL) tablet 650 mg      650 mg Oral EVERY 4 HOURS PRN 06/15/21 1717      06/16/21 1200  aspirin (ASA) chewable tablet 81 mg      81 mg Oral or Feeding Tube DAILY 06/16/21 1147      06/16/21 0830  docusate (COLACE) 50 MG/5ML liquid 100 mg      100 mg Oral or Feeding Tube 2 TIMES DAILY 06/16/21 0819      06/16/21 0800  polyethylene glycol (MIRALAX) Packet 17 g      17 g Oral DAILY 06/15/21 1717      06/15/21 2000  senna-docusate (SENOKOT-S/PERICOLACE) 8.6-50 MG per tablet 1 tablet      1 tablet Oral 2 TIMES DAILY 06/15/21 1717      06/15/21 2000  dexmedetomidine (PRECEDEX) 400 mcg in 0.9% sodium chloride 100 mL      0.2-0.7 mcg/kg/hr × 58.1 kg (Dosing Weight)  2.9-10.2 mL/hr  Intravenous CONTINUOUS 06/15/21 1948      06/15/21 1730  ropivacaine 0.2% in NS perineural infusion simple       Perineural Continuous Nerve Block 06/15/21 1717      06/15/21 1730  ropivacaine 0.2% in NS  perineural infusion simple       Perineural Continuous Nerve Block 06/15/21 1717      06/15/21 1730  acetaminophen (TYLENOL) tablet 975 mg      975 mg Oral EVERY 8 HOURS 06/15/21 1717 06/18/21 1729    06/15/21 1730  fentaNYL (SUBLIMAZE) infusion      25-50 mcg/hr  0.5-1 mL/hr  Intravenous CONTINUOUS 06/15/21 1718      06/15/21 1730  propofol (DIPRIVAN) infusion      5-75 mcg/kg/min × 58.1 kg (Dosing Weight)  1.7-26.1 mL/hr  Intravenous CONTINUOUS 06/15/21 1718      06/15/21 1716  oxyCODONE IR (ROXICODONE) half-tab 2.5 mg      2.5 mg Oral EVERY 4 HOURS PRN 06/15/21 1717      06/15/21 1716  oxyCODONE (ROXICODONE) tablet 5 mg      5 mg Oral EVERY 4 HOURS PRN 06/15/21 1717      06/15/21 1716  diphenhydrAMINE (BENADRYL) solution 12.5 mg      12.5 mg Oral EVERY 6 HOURS PRN 06/15/21 1717      06/15/21 1716  diphenhydrAMINE (BENADRYL) injection 12.5 mg      12.5 mg Intravenous EVERY 6 HOURS PRN 06/15/21 1717      06/15/21 1716  magnesium hydroxide (MILK OF MAGNESIA) suspension 30 mL      30 mL Oral DAILY PRN 06/15/21 1717      06/15/21 1716  bisacodyl (DULCOLAX) Suppository 10 mg      10 mg Rectal DAILY PRN 06/15/21 1717      06/15/21 1716  sodium phosphate (FLEET ENEMA) 1 enema      1 enema Rectal ONCE PRN 06/15/21 1717      06/15/21 1716  HYDROmorphone (DILAUDID) injection 0.2 mg      0.2 mg Intravenous EVERY 2 HOURS PRN 06/15/21 1717      06/15/21 1716  methocarbamol (ROBAXIN) tablet 500 mg      500 mg Oral EVERY 6 HOURS PRN 06/15/21 1717      06/15/21 1716  lidocaine 1 % 0.1-1 mL      0.1-1 mL Other EVERY 1 HOUR PRN 06/15/21 1717      06/15/21 1716  lidocaine (LMX4) cream       Topical EVERY 1 HOUR PRN 06/15/21 1717             Objective:  Lab results  Recent Labs   Lab Test 06/17/21  0441   WBC 15.3*   RBC 3.30*   HGB 10.3*   HCT 30.4*   MCV 92   MCH 31.2   MCHC 33.9   RDW 18.0*   PLT 84*       Lab Results   Component Value Date    INR 1.26 06/15/2021    INR 1.44 06/15/2021    INR 1.86 06/15/2021       Vitals:    Temp:   [98.1  F (36.7  C)-100.6  F (38.1  C)] 98.1  F (36.7  C)  Pulse:  [] 83  Resp:  [16-30] 16  BP: ()/() 123/60  MAP:  [58 mmHg-108 mmHg] 92 mmHg  Arterial Line BP: ()/(42-85) 100/85  FiO2 (%):  [40 %] 40 %  SpO2:  [94 %-99 %] 97 %    Exam:   GEN: pt is critically ill.  Intubated and sedated.  SKIN: did not evaluate insertion sites.  RN to assess insertion sites during repositioning and report to RAPS if any concerns.    Assessment and Plan:     ASSESSMENT  Patient is receiving adequate analgesia with current multimodal therapy including 0.2% ropivacaine PIB (programmed intermittent bolus) at 14 mL Q 60 min at 7 mL/hr via each catheter. Pt is intubated and sedated.    PLAN  - Catheter Day #2 bilateral T4-5 ES catheters/infusion:    Continue Ropivacaine 0.2% PIB (programmed intermittent bolus) infusion at 7 mL Q 60 min via each catheter, total infusion 14 mL/hr, plan to maintain catheters while chest tubes in place, max of 7 days       Patient can be evaluated to receive local anesthetic bolus if needed Q 12 hr for pain, bedside RN should page RAPS to request bolus     - Antithrombotics/Thrombolytics ordered: heparin ANTICOAGULANT injection 5,000 Units, SC, Q8H    Okay to continue Heparin SQ as ordered by primary service  ? Please contact RAPS jobcode pager [phone] before ordering or making any medication changes  Follow up:      RAPS will continue to follow and adjust as needed    - discussed plan with attending anesthesiologist    Gavi Hogue PA-C  Regional Anesthesia Pain Service  6/17/2021 8:11 AM    RAPS Contact Info (24 hour job code pager is the last 4 digits) For in-house use only:   Job code ID: [place] [id number]   [place] [employer] 0507  Peds [phone]  [employer] phone: dial * * * [phone], enter jobcode ID, then enter call-back number.    Text: Use [employer] on the Intranet <Paging/Directory> tab and enter Jobcode ID.   If no call back at any time, contact the hospital  and ask for RAPS attending or  backup

## 2021-06-18 ENCOUNTER — APPOINTMENT (OUTPATIENT)
Dept: OCCUPATIONAL THERAPY | Facility: CLINIC | Age: 86
DRG: 220 | End: 2021-06-18
Attending: SURGERY
Payer: MEDICARE

## 2021-06-18 ENCOUNTER — APPOINTMENT (OUTPATIENT)
Dept: GENERAL RADIOLOGY | Facility: CLINIC | Age: 86
DRG: 220 | End: 2021-06-18
Attending: SURGERY
Payer: MEDICARE

## 2021-06-18 ENCOUNTER — APPOINTMENT (OUTPATIENT)
Dept: SPEECH THERAPY | Facility: CLINIC | Age: 86
DRG: 220 | End: 2021-06-18
Attending: SURGERY
Payer: MEDICARE

## 2021-06-18 LAB
ALBUMIN SERPL-MCNC: 2.1 G/DL (ref 3.4–5)
ALP SERPL-CCNC: 45 U/L (ref 40–150)
ALT SERPL W P-5'-P-CCNC: 24 U/L (ref 0–50)
ANION GAP SERPL CALCULATED.3IONS-SCNC: 5 MMOL/L (ref 3–14)
AST SERPL W P-5'-P-CCNC: 16 U/L (ref 0–45)
BASE EXCESS BLDV CALC-SCNC: 2.9 MMOL/L
BILIRUB SERPL-MCNC: 0.6 MG/DL (ref 0.2–1.3)
BUN SERPL-MCNC: 36 MG/DL (ref 7–30)
CA-I BLD-MCNC: 4.3 MG/DL (ref 4.4–5.2)
CALCIUM SERPL-MCNC: 7.6 MG/DL (ref 8.5–10.1)
CHLORIDE SERPL-SCNC: 108 MMOL/L (ref 94–109)
CO2 SERPL-SCNC: 29 MMOL/L (ref 20–32)
CREAT SERPL-MCNC: 1.99 MG/DL (ref 0.52–1.04)
ERYTHROCYTE [DISTWIDTH] IN BLOOD BY AUTOMATED COUNT: 18.1 % (ref 10–15)
GFR SERPL CREATININE-BSD FRML MDRD: 22 ML/MIN/{1.73_M2}
GLUCOSE BLDC GLUCOMTR-MCNC: 106 MG/DL (ref 70–99)
GLUCOSE BLDC GLUCOMTR-MCNC: 111 MG/DL (ref 70–99)
GLUCOSE BLDC GLUCOMTR-MCNC: 156 MG/DL (ref 70–99)
GLUCOSE BLDC GLUCOMTR-MCNC: 62 MG/DL (ref 70–99)
GLUCOSE SERPL-MCNC: 87 MG/DL (ref 70–99)
HCO3 BLDV-SCNC: 29 MMOL/L (ref 21–28)
HCT VFR BLD AUTO: 27.1 % (ref 35–47)
HGB BLD-MCNC: 8.8 G/DL (ref 11.7–15.7)
LACTATE BLD-SCNC: 0.6 MMOL/L (ref 0.7–2)
MAGNESIUM SERPL-MCNC: 2.4 MG/DL (ref 1.6–2.3)
MCH RBC QN AUTO: 30.7 PG (ref 26.5–33)
MCHC RBC AUTO-ENTMCNC: 32.5 G/DL (ref 31.5–36.5)
MCV RBC AUTO: 94 FL (ref 78–100)
O2/TOTAL GAS SETTING VFR VENT: 21 %
OXYHGB MFR BLDV: 67 %
PCO2 BLDV: 50 MM HG (ref 40–50)
PH BLDV: 7.37 PH (ref 7.32–7.43)
PHOSPHATE SERPL-MCNC: 5 MG/DL (ref 2.5–4.5)
PLATELET # BLD AUTO: 61 10E9/L (ref 150–450)
PO2 BLDV: 38 MM HG (ref 25–47)
POTASSIUM SERPL-SCNC: 4.1 MMOL/L (ref 3.4–5.3)
PROT SERPL-MCNC: 4.7 G/DL (ref 6.8–8.8)
RBC # BLD AUTO: 2.87 10E12/L (ref 3.8–5.2)
SODIUM SERPL-SCNC: 143 MMOL/L (ref 133–144)
WBC # BLD AUTO: 9.8 10E9/L (ref 4–11)

## 2021-06-18 PROCEDURE — 258N000001 HC RX 258: Performed by: SURGERY

## 2021-06-18 PROCEDURE — 250N000013 HC RX MED GY IP 250 OP 250 PS 637: Performed by: STUDENT IN AN ORGANIZED HEALTH CARE EDUCATION/TRAINING PROGRAM

## 2021-06-18 PROCEDURE — 85049 AUTOMATED PLATELET COUNT: CPT | Performed by: SURGERY

## 2021-06-18 PROCEDURE — 82805 BLOOD GASES W/O2 SATURATION: CPT | Performed by: SURGERY

## 2021-06-18 PROCEDURE — 97165 OT EVAL LOW COMPLEX 30 MIN: CPT | Mod: GO

## 2021-06-18 PROCEDURE — 83605 ASSAY OF LACTIC ACID: CPT | Performed by: STUDENT IN AN ORGANIZED HEALTH CARE EDUCATION/TRAINING PROGRAM

## 2021-06-18 PROCEDURE — 250N000013 HC RX MED GY IP 250 OP 250 PS 637: Performed by: SURGERY

## 2021-06-18 PROCEDURE — 97530 THERAPEUTIC ACTIVITIES: CPT | Mod: GO

## 2021-06-18 PROCEDURE — 83735 ASSAY OF MAGNESIUM: CPT | Performed by: STUDENT IN AN ORGANIZED HEALTH CARE EDUCATION/TRAINING PROGRAM

## 2021-06-18 PROCEDURE — 92526 ORAL FUNCTION THERAPY: CPT | Mod: GN

## 2021-06-18 PROCEDURE — 82330 ASSAY OF CALCIUM: CPT | Performed by: STUDENT IN AN ORGANIZED HEALTH CARE EDUCATION/TRAINING PROGRAM

## 2021-06-18 PROCEDURE — 999N001017 HC STATISTIC GLUCOSE BY METER IP

## 2021-06-18 PROCEDURE — 93010 ELECTROCARDIOGRAM REPORT: CPT | Performed by: INTERNAL MEDICINE

## 2021-06-18 PROCEDURE — 71045 X-RAY EXAM CHEST 1 VIEW: CPT | Mod: 26 | Performed by: RADIOLOGY

## 2021-06-18 PROCEDURE — 92610 EVALUATE SWALLOWING FUNCTION: CPT | Mod: GN

## 2021-06-18 PROCEDURE — 93005 ELECTROCARDIOGRAM TRACING: CPT

## 2021-06-18 PROCEDURE — 84100 ASSAY OF PHOSPHORUS: CPT | Performed by: STUDENT IN AN ORGANIZED HEALTH CARE EDUCATION/TRAINING PROGRAM

## 2021-06-18 PROCEDURE — 250N000011 HC RX IP 250 OP 636: Performed by: STUDENT IN AN ORGANIZED HEALTH CARE EDUCATION/TRAINING PROGRAM

## 2021-06-18 PROCEDURE — 250N000011 HC RX IP 250 OP 636: Performed by: SURGERY

## 2021-06-18 PROCEDURE — 250N000013 HC RX MED GY IP 250 OP 250 PS 637: Performed by: NURSE PRACTITIONER

## 2021-06-18 PROCEDURE — 85027 COMPLETE CBC AUTOMATED: CPT | Performed by: STUDENT IN AN ORGANIZED HEALTH CARE EDUCATION/TRAINING PROGRAM

## 2021-06-18 PROCEDURE — 200N000002 HC R&B ICU UMMC

## 2021-06-18 PROCEDURE — 71045 X-RAY EXAM CHEST 1 VIEW: CPT

## 2021-06-18 PROCEDURE — 250N000009 HC RX 250

## 2021-06-18 PROCEDURE — 80053 COMPREHEN METABOLIC PANEL: CPT | Performed by: STUDENT IN AN ORGANIZED HEALTH CARE EDUCATION/TRAINING PROGRAM

## 2021-06-18 RX ORDER — QUETIAPINE FUMARATE 50 MG/1
50 TABLET, FILM COATED ORAL EVERY EVENING
Status: DISCONTINUED | OUTPATIENT
Start: 2021-06-18 | End: 2021-06-23

## 2021-06-18 RX ORDER — DOCUSATE SODIUM 100 MG/1
100 CAPSULE, LIQUID FILLED ORAL 2 TIMES DAILY
Status: DISCONTINUED | OUTPATIENT
Start: 2021-06-18 | End: 2021-06-26 | Stop reason: HOSPADM

## 2021-06-18 RX ORDER — TORSEMIDE 20 MG/1
20 TABLET ORAL DAILY
Status: DISCONTINUED | OUTPATIENT
Start: 2021-06-18 | End: 2021-06-19

## 2021-06-18 RX ORDER — LOVASTATIN 20 MG
40 TABLET ORAL AT BEDTIME
Status: DISCONTINUED | OUTPATIENT
Start: 2021-06-18 | End: 2021-06-26 | Stop reason: HOSPADM

## 2021-06-18 RX ORDER — MULTIPLE VITAMINS W/ MINERALS TAB 9MG-400MCG
1 TAB ORAL DAILY
Status: DISCONTINUED | OUTPATIENT
Start: 2021-06-18 | End: 2021-06-26 | Stop reason: HOSPADM

## 2021-06-18 RX ADMIN — DOCUSATE SODIUM 50 MG AND SENNOSIDES 8.6 MG 1 TABLET: 8.6; 5 TABLET, FILM COATED ORAL at 20:33

## 2021-06-18 RX ADMIN — MUPIROCIN 0.5 G: 20 OINTMENT TOPICAL at 20:39

## 2021-06-18 RX ADMIN — HEPARIN SODIUM 5000 UNITS: 5000 INJECTION, SOLUTION INTRAVENOUS; SUBCUTANEOUS at 08:26

## 2021-06-18 RX ADMIN — ASPIRIN 81 MG CHEWABLE TABLET 81 MG: 81 TABLET CHEWABLE at 08:26

## 2021-06-18 RX ADMIN — DOCUSATE SODIUM 100 MG: 100 CAPSULE, LIQUID FILLED ORAL at 20:35

## 2021-06-18 RX ADMIN — OXYCODONE HYDROCHLORIDE 5 MG: 5 TABLET ORAL at 01:12

## 2021-06-18 RX ADMIN — Medication 10 MG: at 20:41

## 2021-06-18 RX ADMIN — Medication 12.5 MG: at 20:32

## 2021-06-18 RX ADMIN — HYDRALAZINE HYDROCHLORIDE 10 MG: 20 INJECTION INTRAMUSCULAR; INTRAVENOUS at 20:42

## 2021-06-18 RX ADMIN — TORSEMIDE 20 MG: 20 TABLET ORAL at 16:32

## 2021-06-18 RX ADMIN — ONDANSETRON 4 MG: 2 INJECTION INTRAMUSCULAR; INTRAVENOUS at 20:32

## 2021-06-18 RX ADMIN — ACETAMINOPHEN 650 MG: 325 TABLET, FILM COATED ORAL at 09:48

## 2021-06-18 RX ADMIN — HYDRALAZINE HYDROCHLORIDE 10 MG: 20 INJECTION INTRAMUSCULAR; INTRAVENOUS at 10:44

## 2021-06-18 RX ADMIN — HYDRALAZINE HYDROCHLORIDE 10 MG: 20 INJECTION INTRAMUSCULAR; INTRAVENOUS at 16:31

## 2021-06-18 RX ADMIN — MUPIROCIN 0.5 G: 20 OINTMENT TOPICAL at 08:28

## 2021-06-18 RX ADMIN — ACETAMINOPHEN 650 MG: 325 TABLET, FILM COATED ORAL at 20:34

## 2021-06-18 RX ADMIN — POLYETHYLENE GLYCOL 3350 17 G: 17 POWDER, FOR SOLUTION ORAL at 08:27

## 2021-06-18 RX ADMIN — Medication: at 09:49

## 2021-06-18 RX ADMIN — Medication 12.5 MG: at 12:05

## 2021-06-18 RX ADMIN — OXYCODONE HYDROCHLORIDE 5 MG: 5 TABLET ORAL at 20:37

## 2021-06-18 RX ADMIN — HEPARIN SODIUM 5000 UNITS: 5000 INJECTION, SOLUTION INTRAVENOUS; SUBCUTANEOUS at 16:32

## 2021-06-18 RX ADMIN — PANTOPRAZOLE SODIUM 40 MG: 40 TABLET, DELAYED RELEASE ORAL at 08:26

## 2021-06-18 RX ADMIN — PAROXETINE HYDROCHLORIDE 10 MG: 10 TABLET, FILM COATED ORAL at 08:26

## 2021-06-18 RX ADMIN — QUETIAPINE 50 MG: 50 TABLET ORAL at 20:38

## 2021-06-18 RX ADMIN — HEPARIN SODIUM 5000 UNITS: 5000 INJECTION, SOLUTION INTRAVENOUS; SUBCUTANEOUS at 23:00

## 2021-06-18 RX ADMIN — METHOCARBAMOL 500 MG: 500 TABLET, FILM COATED ORAL at 20:33

## 2021-06-18 RX ADMIN — LOVASTATIN 40 MG: 20 TABLET ORAL at 22:52

## 2021-06-18 RX ADMIN — DIPHENHYDRAMINE HYDROCHLORIDE 12.5 MG: 50 INJECTION, SOLUTION INTRAMUSCULAR; INTRAVENOUS at 20:35

## 2021-06-18 RX ADMIN — DEXTROSE MONOHYDRATE 50 ML: 500 INJECTION PARENTERAL at 03:55

## 2021-06-18 ASSESSMENT — ACTIVITIES OF DAILY LIVING (ADL)
ADLS_ACUITY_SCORE: 19
PREVIOUS_RESPONSIBILITIES: MEAL PREP;HOUSEKEEPING;LAUNDRY;SHOPPING;MEDICATION MANAGEMENT
ADLS_ACUITY_SCORE: 19
ADLS_ACUITY_SCORE: 19
ADLS_ACUITY_SCORE: 22
ADLS_ACUITY_SCORE: 21
ADLS_ACUITY_SCORE: 22

## 2021-06-18 ASSESSMENT — MIFFLIN-ST. JEOR
SCORE: 1043.25
SCORE: 1046.25

## 2021-06-18 NOTE — PHARMACY-CONSULT NOTE
Pharmacy Delirium Chart Review    Upon chart review, the following medications may contribute to possible patient delirium: none.  Please consult unit pharmacist with further questions.    Loraine Gongora MELISSA  Phone/Pager: [phone]

## 2021-06-18 NOTE — PROGRESS NOTES
06/18/21 0900   Quick Adds   Type of Visit Initial Occupational Therapy Evaluation   Living Environment   People in home spouse   Current Living Arrangements house   Home Accessibility no concerns   Transportation Anticipated family or friend will provide   Living Environment Comments Pt lives with her  in a ranch style home w/ laundry on the main level. Pt has a tub shower w/ grab bars.   Self-Care   Usual Activity Tolerance moderate   Current Activity Tolerance poor   Equipment Currently Used at Home shower chair;grab bar, tub/shower   Activity/Exercise/Self-Care Comment Pt reports IND with I/ADLs and mobility at baseline.   Disability/Function   Hearing Difficulty or Deaf no   Wear Glasses or Blind yes   Concentrating, Remembering or Making Decisions Difficulty no   Difficulty Communicating no   Difficulty Eating/Swallowing no   Walking or Climbing Stairs Difficulty no   Dressing/Bathing Difficulty no   Toileting issues no   Doing Errands Independently Difficulty (such as shopping) no   Fall history within last six months no   Change in Functional Status Since Onset of Current Illness/Injury yes   General Information   Onset of Illness/Injury or Date of Surgery 06/15/21   Referring Physician Elenita Rivera MD   Patient/Family Therapy Goal Statement (OT) To return home and to PLOF   Additional Occupational Profile Info/Pertinent History of Current Problem Racquel Emmanuel is a 86 year old female with PMH of 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 Smith on 6/15/2021.   Existing Precautions/Restrictions fall;sternal   Left Upper Extremity (Weight-bearing Status) other (see comments)   Right Upper Extremity  (Weight-bearing Status)   (10# limit)   Left Lower Extremity (Weight-bearing Status) other (see comments)  (10# limit)   General Observations and Info Activity: amb w/ A   Cognitive Status Examination   Orientation Status orientation to person, place and time   Affect/Mental Status (Cognitive) WFL   Follows Commands follows one-step commands;follows two-step commands;75-90% accuracy   Safety Deficit impulsivity   Cognitive Status Comments Pt slightly impulsive during mobility and requiring VC to correct. Will continue to monitor and assess   Sensory   Sensory Quick Adds No deficits were identified   Integumentary/Edema   Integumentary/Edema no deficits were identifed   Posture   Posture forward head position;protracted shoulders   Range of Motion Comprehensive   Comment, General Range of Motion Observed to be WFL   Strength Comprehensive (MMT)   Comment, General Manual Muscle Testing (MMT) Assessment Not formally tested 2/2 to sternal precautions   Bed Mobility   Bed Mobility supine-sit   Supine-Sit [place] (Bed Mobility) moderate assist (50% patient effort);2 person assist;1 person to manage equipment   Transfers   Transfers toilet transfer   Toilet Transfer   Type (Toilet Transfer) stand pivot/stand step   [place] Level (Toilet Transfer) moderate assist (50% patient effort);2 person assist;1 person to manage equipment   Assistive Device (Toilet Transfer) commode chair   Instrumental Activities of Daily Living (IADL)   Previous Responsibilities meal prep;housekeeping;laundry;shopping;medication management   Clinical Impression   Criteria for Skilled Therapeutic Interventions Met (OT) yes;skilled treatment is necessary   OT Diagnosis Decreased I/ADL IND and func mobility   OT Problem List-Impairments impacting ADL problems related to;activity tolerance impaired;balance;mobility;strength;post-surgical precautions   Assessment of Occupational Performance 5 or more Performance Deficits   Identified  Performance Deficits dressing, bathing, toileting, amb, transfers, bed mobility, home mgmt   Planned Therapy Interventions (OT) ADL retraining;IADL retraining;bed mobility training;strengthening;transfer training;home program guidelines;progressive activity/exercise;risk factor education   Clinical Decision Making Complexity (OT) low complexity   Therapy Frequency (OT) 6x/week   Predicted Duration of Therapy 2 weeks   Risk & Benefits of therapy have been explained evaluation/treatment results reviewed;care plan/treatment goals reviewed;risks/benefits reviewed;current/potential barriers reviewed;participants included;patient   Comment-Clinical Impression Pt would benefit from continued skilled OT and CR to progress I/ADL IND and cardiopulmonary strength/endurance.   OT Discharge Planning    OT Discharge Recommendation (DC Rec) Transitional Care Facility   OT Rationale for DC Rec Pt is far below func baseline and anticipate pt will require rehab stay to progress IADL IND and func mobility. At this time, pt would not like to d/c to TCU and would prefer to go home w/ A from daughter.   OT Brief overview of current status  A x 2 with FWW for short pivots to/from chair/commode   Total Evaluation Time (Minutes)   Total Evaluation Time (Minutes) 5

## 2021-06-18 NOTE — PLAN OF CARE
Major Shift Events:  hydralazine given x2 today to keep SBP below 120. Pt up with mid-mod assist of 2, perez removed. Voided post removal. 2 BMs today. Passed swallow eval with speech, pt had some soup for dinner, dim appetite. More output from med chest tubes, minimal from pleural.  and daughter at bedside today.    Plan: transfer to  tomorrow? Continue working with therapy.     For vital signs and complete assessments, please see documentation flowsheets.

## 2021-06-18 NOTE — PROGRESS NOTES
06/18/21 1435   General Information   Onset of Illness/Injury or Date of Surgery 06/15/21   Referring Physician John Pink MD   Patient/Family Therapy Goal Statement (SLP) Pt is a 86 year old female with PMH of 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 Smtih on 6/15/2021. Extubated 6/17.   Past History of Dysphagia None per EMR review or per pt report   Type of Evaluation   Type of Evaluation Swallow Evaluation   Oral Motor   Oral Musculature generally intact   Structural Abnormalities none present   Mucosal Quality dry   Dentition (Oral Motor)   Dentition (Oral Motor) adequate dentition   Facial Symmetry (Oral Motor)   Facial Symmetry (Oral Motor) WNL   Lip Function (Oral Motor)   Lip Range of Motion (Oral Motor) WNL   Tongue Function (Oral Motor)   Tongue ROM (Oral Motor) WNL   Cough/Swallow/Gag Reflex (Oral Motor)   Volitional Throat Clear/Cough (Oral Motor) WNL   Volitional Swallow (Oral Motor) mildly delayed   Vocal Quality/Secretion Management (Oral Motor)   Vocal Quality (Oral Motor) hoarse   Secretion Management (Oral Motor) WNL   General Swallowing Observations   Current Diet/Method of Nutritional Intake (General Swallowing Observations, NIS) clear liquid diet   Respiratory Support (General Swallowing Observations) nasal cannula   Swallowing Evaluation Clinical swallow evaluation   Clinical Swallow Evaluation   Feeding Assistance no assistance needed   Additional evaluation(s) completed today No   Clinical Swallow Evaluation Textures Trialed Thin Liquids;Puree Textures;Solid Foods   Clinical Swallow Eval: Thin Liquid Texture Trial   Mode of Presentation, Thin Liquids straw;self-fed   Volume of Liquid or Food  Presented 6 oz   Oral Phase of Swallow WFL   Pharyngeal Phase of Swallow intact   Diagnostic Statement Adequate oral phase with no overt s/s aspiration    Clinical Swallow Evaluation: Puree Solid Texture Trial   Mode of Presentation, Puree spoon;self-fed   Volume of Puree Presented 2 oz   Oral Phase, Puree WFL   Pharyngeal Phase, Puree intact   Diagnostic Statement Adequate oral phase with no overt s/s aspiration    Clinical Swallow Evaluation: Solid Food Texture Trial   Mode of Presentation, Solid self-fed   Volume of Solid Food Presented 1 cracker   Oral Phase, Solid WFL   Pharyngeal Phase, Solid intact   Diagnostic Statement Adequate oral phase with no overt s/s aspiration    Esophageal Phase of Swallow   Patient reports or presents with symptoms of esophageal dysphagia No   Swallowing Recommendations   Diet Consistency Recommendations regular diet;thin liquids   Supervision Level for Intake patient independent   Mode of Delivery Recommendations bolus size, small   Swallowing Maneuver Recommendations alternate food and liquid intake   Monitoring/Assistance Required (Eating/Swallowing) stop eating activities when fatigue is present;monitor for cough or change in vocal quality with intake   Recommended Feeding/Eating Techniques (Swallow Eval) maintain upright sitting position for eating   Medication Administration Recommendations, Swallowing (SLP) whole with puree or thin liquid per pt preference   Instrumental Assessment Recommendations instrumental evaluation not recommended at this time   General Therapy Interventions   Planned Therapy Interventions Dysphagia Treatment   Dysphagia treatment Instruction of safe swallow strategies   SLP Therapy Assessment/Plan   Criteria for Skilled Therapeutic Interventions Met (SLP Eval) yes;treatment indicated   SLP Diagnosis functional oropharyngeal swallow function    Rehab Potential (SLP Eval) good, to achieve stated therapy goals   Therapy Frequency (SLP Eval) 5 times/wk    Predicted Duration of Therapy Intervention (SLP Eval) 1-2 follow up sessions to ensure diet tolerance   Comment, Therapy Assessment/Plan (SLP) SLP: Bedside swallow evaluation completed per MD orders. Oral mechanism was unremarkable. Pt presents with functional oropharyngeal swallowing mechanism and regular/thin liquid diet is recommended. Pt was assessed with regular and puree solids and thin liquids. Pt demonstrated functional and complete mastication, adequate bolus control, no oral residuals, was able to clear bolus with single swallow, no report of sticking sensation in throat, no change in vocal quality following PO trials, and no overt s/s aspiration noted. Speech therapy will follow up 1-2x to ensure diet tolerance however do not suspect ongoing speech therapy will be warranted.   Therapy Plan Review/Discharge Plan (SLP)   Therapy Plan Review (SLP) evaluation/treatment results reviewed;care plan/treatment goals reviewed;participants voiced agreement with care plan;participants included;patient   SLP Discharge Planning    SLP Discharge Recommendation (DC Rec) home   SLP Rationale for DC Rec functional oropharyngeal swallowing function , anticipate pt will meet swallowing goals prior to discharge   SLP Brief overview of current status  Recommend pt advance to regular solids/thin liquids. Pt should be fully awake, alert, and sitting upright. Pt okay for meds whole in puree or thin liquids. Speech therapy will follow up 1-2 sessions to ensure continued diet tolerance and review safe swallow strategies.     Total Evaluation Time   Total Evaluation Time (Minutes) 15

## 2021-06-18 NOTE — PLAN OF CARE
Nights:  DARRYL  Pt restless much of night and did not sleep well.  Frequent requests on call light.  Pain is moderate and controlled with oxy and tylenol.  Afebrile with dim lungs.  Urine average 75cc/hr.  CT output about 30cc/hr combined.    P  Increase activity, pulm toilet.  Advance diet as stephen.  Assess anxiety and mood.

## 2021-06-18 NOTE — PROGRESS NOTES
CV ICU PROGRESS NOTE  June 18, 2021      CO-MORBIDITIES:  Ascending aortic aneurysm (H)  Ascending aortic aneurysm (H)  S/P AVR (22 mm bioprosthetic bovine valve) 6/3/2015 ([place])    Past Medical History:   Diagnosis Date     Anemia 4/5/2021     Antiplatelet or antithrombotic long-term use      Aortic aneurysm (H) 3/24/2021     Aortic insufficiency 3/24/2021     Arthritis      Ascending aortic aneurysm (H)      Clinical diagnosis of COVID-19; 7/2020 4/5/2021     Congestive heart failure (H)      Gastroesophageal reflux disease without esophagitis 4/5/2021     GI bleed; 8/2019 4/5/2021     History of cholecystectomy 4/5/2021     History of lobectomy of lung; 2/13/2017 ([place]) 4/5/2021     History of lung cancer, right middle lobe; 2/2017 4/5/2021     Hyperlipidemia LDL goal <130 4/5/2021     Hypertension      ICD (implantable cardioverter-defibrillator); 2015 4/5/2021     Large B-cell lymphoma 2014 4/5/2021     Macular degeneration 4/5/2021     Nonischemic cardiomyopathy (H) 3/24/2021     MIGUELANGEL (obstructive sleep apnea) 4/5/2021     Osteopenia 4/5/2021     Oxygen dependent     2.5 l via NC at night time only     Pacemaker      Paroxysmal atrial fibrillation (H) 4/5/2021     S/P AVR (22 mm bioprosthetic valve) 3/24/2021     Temporal giant cell arteritis (H) 4/5/2021     ASSESSMENT: Racquel Emmanuel is a 86 year old female with PMH of 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 Smith on 6/15/2021. Extubated 6/17.     TODAY'S PROGRESS:   - Increase Seroquel 50mg at bedtime  - Start Metoprolol 12.5mg BID (lower than PTA dose)  - Resume PTA lovastatin 40mg daily  - Discontinue epinephrine gtt  - Hold off on Pleural Chest tube removal given  thrombocytopenia  - Speech consult to eval swallow  - Discontinue Protein and liquid vitamins  - Resume PTA Torsemide  - Remove Self today  - Discontinue insulin gtt, hypoglycemic overnight        Neuro/pain/sedation:  Post-op pain   Chronic back pain  H/O subdural hematoma (2020), no residual deficits  H/O of delirium  H/O PONV  - PTA paroxetine  - ES catheters in place w/ ropivacaine infusion, RAPS folowing  - scheduled APAP q8h  - Prn methocarbamol / oxycodone/ hydromorphone   - Increase Seroquel 50mg at bedtime  - Scheduled melatonin at bedtime  - Delirium precautions     Pulmonary care:   Postoperative respiratory insufficiency  H/O Lung SCC s/p RML lobectomy   MIGUELANGEL  - Mechanically ventilated; extubated 6/17  - Wean SpO2 for O2 sats >92%  - Keep chest tubes -20 cm H2O suction  - Hold off on Pleural Chest tube removal given thrombocytopenia    Cardiovascular:  Non-ischemic cardiomyopathy (EF 55-60% 3/2021)  HFpEF  HTN/HLD  Post-operative cardiogenic vs vasoplegic shock  Paroxysmal atrial fibrillation s/p AICD  Hx Aortic Stenosis s/p bioprosthetic AVR (2015)  Ascending Aortic Aneurysm s/p repair with Dr. Smith 6/15/2021  - goal MAP >65, SBP <120  - Continue hold PTA warfarin, metoprolol, digoxin, losartan, statin, torsemide  - V Pacing wire, her Dual Lead ICD set at DDD 50  - Aspirin daily  - Start Metoprolol 12.5mg BID (lower than PTA dose)  - Resume PTA lovastatin 40mg daily  - Discontinue epinephrine gtt      GI care:  IBS  GERD  Hx GI bleed, resolved  - NPO   - bowel regimen  - PPI daily  - Antiemetics (zofran, compazine) PRN, given nausea with opioids  - Speech consult to eval swallow  - Discontinue Protein and liquid vitamins    Renal/Fluid, Electrolytes, and nutrition:  CKD (baseline Cr 1.6)  Acute kidney injury  Lactic acidosis, resolved  - Lactate normalized  - Monitor intake and output.  - Trend BMP daily  - Resume PTA Torsemide  - Remove Self today    Endocrine:  Stress hyperglycemia  - hypoglycemia  protocol  - Discontinue insulin gtt, hypoglycemic overnight     ID/ Antibiotics:  - Perioperative antibiotics: clindamycin, vancomycin complete  - Continue mupirocin to complete 5-day course (day 3)  - Trend WBC and fever curve      Heme:  Anemia  H/O B Cell lymphoma  Acute blood loss anemia, EBL 1L  Leukocytosis, improving  Thrombocytopenia, c/f HIT  Pre-op Hgb 10.3, post-op 12.8, now: 10.3  - Diffuse oozing intraop s/p  5u PRBC, 2u FFP, 2u Plt, 650 Cellsaver, 1g Kcentra, 1g Fibryga  - Monitor CBC  - 6/17 HIT Screen- negative    Prophylaxis:  - SCD  - PPI  - SQH     Lines/ tubes/ drains:  - CVL- Left internal jugular MAC (calcified other side), unable to float jaciel, hands free triple lumen  - Self- remove  - Chest tubes  --- Anterior mediastinal x2  --- Pleural x 2     Disposition:  - CVICU        Patient seen, findings and plan discussed with CV ICU staff, Dr. Patel.    Hilary Fong,   General Surgery, PGY3  [id number]      ====================================  SUBJECTIVE:  Hypoglycemic overnight. Weaned off pressors overnight. Some hallucinations. Didn't sleep well overnight.     OBJECTIVE:   1. VITAL SIGNS:   Temp:  [97.6  F (36.4  C)-98.8  F (37.1  C)] 97.6  F (36.4  C)  Pulse:  [] 105  Resp:  [16-28] 18  BP: ()/(39-82) 117/57  SpO2:  [86 %-99 %] 97 %  Ventilation Mode: (S) CMV/AC  (Continuous Mandatory Ventilation/ Assist Control)  FiO2 (%): 40 %  Rate Set (breaths/minute): 16 breaths/min  Tidal Volume Set (mL): 400 mL  PEEP (cm H2O): 5 cmH2O  Pressure Support (cm H2O): 10 cmH2O  Oxygen Concentration (%): 40 %  Resp: 18    2. INTAKE/ OUTPUT:   I/O last 3 completed shifts:  In: 1939.42 [P.O.:1480; I.V.:274.42; NG/GT:185]  Out: 2517 [Urine:1817; Chest Tube:700]    3. PHYSICAL EXAMINATION:   General: NAD  Neuro: awake, follows commands  Resp: nonlabored on supplemental O2  CV: RRR, non-cyanotic  Chest tubes to suction, serosanguinous output, no air leak  Incisions: c/d/i  Abdomen: Soft,  Non-distended  Extremities: warm and well perfused    4. INVESTIGATIONS:   Arterial Blood Gases   Recent Labs   Lab 06/17/21 0441 06/16/21  0302 06/16/21  0025 06/15/21  1929   PH 7.42 7.32* 7.32* 7.37   PCO2 41 44 43 42   PO2 88 137* 107* 217*   HCO3 26 23 22 24     Complete Blood Count   Recent Labs   Lab 06/18/21  0303 06/17/21  0441 06/16/21  0302 06/15/21  1929   WBC 9.8 15.3* 24.4* 21.4*   HGB 8.8* 10.3* 13.1 12.5   PLT 61* 84* 196 172     Basic Metabolic Panel  Recent Labs   Lab 06/18/21 0303 06/17/21  1222 06/17/21 0441 06/16/21  1428 06/16/21 0302    146* 141  --  144   POTASSIUM 4.1 4.6 3.3*  --  3.9   CHLORIDE 108 114* 108  --  109   CO2 29 28 25  --  24   BUN 36* 36* 32*  --  24   CR 1.99* 2.01* 2.08*  --  1.48*   GLC 87 125* 143* 109* 172*     Liver Function Tests  Recent Labs   Lab 06/18/21  0303 06/17/21  0441 06/16/21  0302 06/15/21  2253 06/15/21  1710 06/15/21  1513   AST 16 22 52*  --  Canceled, Test credited  --    ALT 24 26 48  --  37  --    ALKPHOS 45 37* 35*  --  30*  --    BILITOTAL 0.6 0.9 0.5  --  1.2  --    ALBUMIN 2.1* 2.0* 2.5*  --  2.3*  --    INR  --   --   --  1.26* 1.44* 1.86*     Pancreatic Enzymes  No lab results found in last 7 days.  Coagulation Profile  Recent Labs   Lab 06/15/21  2253 06/15/21  1710 06/15/21  1513   INR 1.26* 1.44* 1.86*   PTT  --  119* >240*     5. RADIOLOGY:   No results found for this or any previous visit (from the past 24 hour(s)).    =========================================

## 2021-06-18 NOTE — PROGRESS NOTES
REGIONAL ANESTHESIA PAIN SERVICE CONTINUOUS NERVE INFUSION NOTE  June 18, 2021    Racquel Emmanuel is a 86 year old female with history of 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 Smith on 6/15/2021.  Prior to surgery RAPS placed bilateral T4-5 erector spinae (ES) catheters on 6/15/21 for analgesia.     Subjective and Interval History: Overnight events: extubated, now on nasal cannula.  Seen at 0910 and 0950.  Patient reports no pain. Adequate pain control with current nerve block infusion and analgesics (see below). Sitting up in chair.   Denies weakness, paresthesias, circumoral numbness, metallic taste or tinnitus.     Antithrombotic/Thrombolytic Therapy ordered:  heparin ANTICOAGULANT injection 5,000 Units, SC, Q8H       Analgesic Medications:   Medications related to Pain Management (From now, onward)    Start     Dose/Rate Route Frequency Ordered Stop    06/18/21 0000  acetaminophen (TYLENOL) tablet 650 mg      650 mg Oral EVERY 4 HOURS PRN 06/15/21 1717      06/16/21 1200  aspirin (ASA) chewable tablet 81 mg      81 mg Oral or Feeding Tube DAILY 06/16/21 1147      06/16/21 0830  docusate (COLACE) 50 MG/5ML liquid 100 mg      100 mg Oral or Feeding Tube 2 TIMES DAILY 06/16/21 0819      06/16/21 0800  polyethylene glycol (MIRALAX) Packet 17 g      17 g Oral DAILY 06/15/21 1717      06/15/21 2000  senna-docusate (SENOKOT-S/PERICOLACE) 8.6-50 MG per tablet 1 tablet      1 tablet Oral 2 TIMES DAILY 06/15/21 1717      06/15/21 2000  dexmedetomidine (PRECEDEX) 400 mcg in 0.9% sodium chloride 100 mL      0.2-1.2 mcg/kg/hr × 58.1 kg (Dosing Weight)  2.9-17.4 mL/hr  Intravenous CONTINUOUS 06/15/21 1948      06/15/21 8324  ropivacaine 0.2% in  NS perineural infusion simple       Perineural Continuous Nerve Block 06/15/21 1717      06/15/21 1730  ropivacaine 0.2% in NS perineural infusion simple       Perineural Continuous Nerve Block 06/15/21 1717      06/15/21 1730  acetaminophen (TYLENOL) tablet 975 mg      975 mg Oral EVERY 8 HOURS 06/15/21 1717 06/18/21 1729    06/15/21 1716  oxyCODONE IR (ROXICODONE) half-tab 2.5 mg      2.5 mg Oral EVERY 4 HOURS PRN 06/15/21 1717      06/15/21 1716  oxyCODONE (ROXICODONE) tablet 5 mg      5 mg Oral EVERY 4 HOURS PRN 06/15/21 1717      06/15/21 1716  diphenhydrAMINE (BENADRYL) solution 12.5 mg      12.5 mg Oral EVERY 6 HOURS PRN 06/15/21 1717      06/15/21 1716  diphenhydrAMINE (BENADRYL) injection 12.5 mg      12.5 mg Intravenous EVERY 6 HOURS PRN 06/15/21 1717      06/15/21 1716  magnesium hydroxide (MILK OF MAGNESIA) suspension 30 mL      30 mL Oral DAILY PRN 06/15/21 1717      06/15/21 1716  bisacodyl (DULCOLAX) Suppository 10 mg      10 mg Rectal DAILY PRN 06/15/21 1717      06/15/21 1716  sodium phosphate (FLEET ENEMA) 1 enema      1 enema Rectal ONCE PRN 06/15/21 1717      06/15/21 1716  HYDROmorphone (DILAUDID) injection 0.2 mg      0.2 mg Intravenous EVERY 2 HOURS PRN 06/15/21 1717      06/15/21 1716  methocarbamol (ROBAXIN) tablet 500 mg      500 mg Oral EVERY 6 HOURS PRN 06/15/21 1717      06/15/21 1716  lidocaine 1 % 0.1-1 mL      0.1-1 mL Other EVERY 1 HOUR PRN 06/15/21 1717      06/15/21 1716  lidocaine (LMX4) cream       Topical EVERY 1 HOUR PRN 06/15/21 1717             Objective:  Lab results  Recent Labs   Lab Test 06/18/21  0303   WBC 9.8   RBC 2.87*   HGB 8.8*   HCT 27.1*   MCV 94   MCH 30.7   MCHC 32.5   RDW 18.1*   PLT 61*       Lab Results   Component Value Date    INR 1.26 06/15/2021    INR 1.44 06/15/2021    INR 1.86 06/15/2021       Vitals:    Temp:  [97.9  F (36.6  C)-98.8  F (37.1  C)] 98.6  F (37  C)  Pulse:  [] 112  Resp:  [16-28] 20  BP: ()/(39-82) 115/61  FiO2 (%):  [40 %]  40 %  SpO2:  [86 %-100 %] 95 %    Exam:   GEN: alert and no distress  NEURO/MSK: Moving UE and LE.  Strength LE intact and overall symmetric  SKIN: bilateral erector spinae (ES) catheter sites with dressing c/d/i, no tenderness, erythema, heme, edema    Assessment and Plan:     ASSESSMENT  Patient is receiving adequate analgesia with current multimodal therapy including 0.2% ropivacaine PIB (programmed intermittent bolus) at 14 mL Q 60 min at 7 mL/hr via each catheter. Pt is intubated and sedated.     PLAN  - Catheter Day #3 bilateral T4-5 ES catheters/infusion:    Continue Ropivacaine 0.2% PIB (programmed intermittent bolus) infusion at 7 mL Q 60 min via each catheter, total infusion 14 mL/hr, plan to maintain catheters while chest tubes in place, max of 7 days       Patient can be evaluated to receive local anesthetic bolus if needed Q 12 hr for pain, bedside RN should page RAPS to request bolus    Monitor platelet, yesterday 6/17/21 platelet count 84, 6/18/21 platelet count 61     - Antithrombotics/Thrombolytics ordered: heparin ANTICOAGULANT injection 5,000 Units, SC, Q8H    Okay to continue Heparin SQ as ordered by primary service  ? Please contact RAPS jobcode pager [phone] before ordering or making any medication changes      Follow up:      RAPS will continue to follow and adjust as needed    - discussed plan with attending anesthesiologist    Gavi Hogue PA-C  [facility]  6/18/2021 7:29 AM    RAPS Contact Info (24 hour job code pager is the last 4 digits) For in-house use only:   Job code ID: [id number]   [facility] 0599  [facility] 0602  [place] phone: dial * * * [phone], enter jobcode ID, then enter call-back number.    Text: Use [employer] on the Intranet <Paging/Directory> tab and enter Jobcode ID.   If no call back at any time, contact the hospital  and ask for RAPS attending or backup

## 2021-06-18 NOTE — PROGRESS NOTES
SPIRITUAL HEALTH SERVICES  SPIRITUAL ASSESSMENT Progress Note  [facility] ([place]) 4E     Follow-up visit with pt, who said she feels like she is making good progress. We talked about how involved the surgery was - pt very grateful for the care she has received. Prayer was shared.    PLAN: I let pt know I will be gone next week, but  support always available if desired.    Delroy Thompson) Soy Rodriguez M.Div., [facility]  Staff   Pager [phone]      * [facility] remains available 24/7 for emergent requests/referrals, either by having the switchboard page the on-call  or by entering an ASAP/STAT consult in Epic (this will also page the on-call ). Routine Epic consults receive an initial response within 24 hours.*

## 2021-06-19 ENCOUNTER — APPOINTMENT (OUTPATIENT)
Dept: OCCUPATIONAL THERAPY | Facility: CLINIC | Age: 86
DRG: 220 | End: 2021-06-19
Attending: SURGERY
Payer: MEDICARE

## 2021-06-19 ENCOUNTER — APPOINTMENT (OUTPATIENT)
Dept: PHYSICAL THERAPY | Facility: CLINIC | Age: 86
DRG: 220 | End: 2021-06-19
Attending: SURGERY
Payer: MEDICARE

## 2021-06-19 ENCOUNTER — APPOINTMENT (OUTPATIENT)
Dept: GENERAL RADIOLOGY | Facility: CLINIC | Age: 86
DRG: 220 | End: 2021-06-19
Attending: SURGERY
Payer: MEDICARE

## 2021-06-19 LAB
ALBUMIN SERPL-MCNC: 2.2 G/DL (ref 3.4–5)
ALP SERPL-CCNC: 53 U/L (ref 40–150)
ALT SERPL W P-5'-P-CCNC: 19 U/L (ref 0–50)
ANION GAP SERPL CALCULATED.3IONS-SCNC: 6 MMOL/L (ref 3–14)
AST SERPL W P-5'-P-CCNC: 24 U/L (ref 0–45)
BILIRUB SERPL-MCNC: 0.5 MG/DL (ref 0.2–1.3)
BUN SERPL-MCNC: 38 MG/DL (ref 7–30)
CA-I BLD-MCNC: 4.3 MG/DL (ref 4.4–5.2)
CALCIUM SERPL-MCNC: 7.9 MG/DL (ref 8.5–10.1)
CHLORIDE SERPL-SCNC: 108 MMOL/L (ref 94–109)
CO2 SERPL-SCNC: 28 MMOL/L (ref 20–32)
CREAT SERPL-MCNC: 1.58 MG/DL (ref 0.52–1.04)
ERYTHROCYTE [DISTWIDTH] IN BLOOD BY AUTOMATED COUNT: 17.9 % (ref 10–15)
GFR SERPL CREATININE-BSD FRML MDRD: 29 ML/MIN/{1.73_M2}
GLUCOSE SERPL-MCNC: 105 MG/DL (ref 70–99)
HCT VFR BLD AUTO: 28.3 % (ref 35–47)
HGB BLD-MCNC: 9.1 G/DL (ref 11.7–15.7)
MAGNESIUM SERPL-MCNC: 2.6 MG/DL (ref 1.6–2.3)
MCH RBC QN AUTO: 30.8 PG (ref 26.5–33)
MCHC RBC AUTO-ENTMCNC: 32.2 G/DL (ref 31.5–36.5)
MCV RBC AUTO: 96 FL (ref 78–100)
PHOSPHATE SERPL-MCNC: 3.9 MG/DL (ref 2.5–4.5)
PLATELET # BLD AUTO: 66 10E9/L (ref 150–450)
PLATELET # BLD AUTO: 78 10E9/L (ref 150–450)
POTASSIUM SERPL-SCNC: 3.8 MMOL/L (ref 3.4–5.3)
PROT SERPL-MCNC: 5.3 G/DL (ref 6.8–8.8)
RBC # BLD AUTO: 2.95 10E12/L (ref 3.8–5.2)
SODIUM SERPL-SCNC: 142 MMOL/L (ref 133–144)
WBC # BLD AUTO: 9.6 10E9/L (ref 4–11)

## 2021-06-19 PROCEDURE — 97535 SELF CARE MNGMENT TRAINING: CPT | Mod: GO | Performed by: OCCUPATIONAL THERAPIST

## 2021-06-19 PROCEDURE — 250N000013 HC RX MED GY IP 250 OP 250 PS 637: Performed by: STUDENT IN AN ORGANIZED HEALTH CARE EDUCATION/TRAINING PROGRAM

## 2021-06-19 PROCEDURE — 250N000013 HC RX MED GY IP 250 OP 250 PS 637: Performed by: PHYSICIAN ASSISTANT

## 2021-06-19 PROCEDURE — 250N000011 HC RX IP 250 OP 636: Performed by: PHYSICIAN ASSISTANT

## 2021-06-19 PROCEDURE — 80053 COMPREHEN METABOLIC PANEL: CPT | Performed by: STUDENT IN AN ORGANIZED HEALTH CARE EDUCATION/TRAINING PROGRAM

## 2021-06-19 PROCEDURE — 71045 X-RAY EXAM CHEST 1 VIEW: CPT

## 2021-06-19 PROCEDURE — 97110 THERAPEUTIC EXERCISES: CPT | Mod: GO | Performed by: OCCUPATIONAL THERAPIST

## 2021-06-19 PROCEDURE — 250N000009 HC RX 250: Performed by: STUDENT IN AN ORGANIZED HEALTH CARE EDUCATION/TRAINING PROGRAM

## 2021-06-19 PROCEDURE — 97530 THERAPEUTIC ACTIVITIES: CPT | Mod: GP

## 2021-06-19 PROCEDURE — 71045 X-RAY EXAM CHEST 1 VIEW: CPT | Mod: 26 | Performed by: RADIOLOGY

## 2021-06-19 PROCEDURE — 85027 COMPLETE CBC AUTOMATED: CPT | Performed by: STUDENT IN AN ORGANIZED HEALTH CARE EDUCATION/TRAINING PROGRAM

## 2021-06-19 PROCEDURE — 214N000001 HC R&B CCU UMMC

## 2021-06-19 PROCEDURE — 250N000013 HC RX MED GY IP 250 OP 250 PS 637: Performed by: NURSE PRACTITIONER

## 2021-06-19 PROCEDURE — 250N000011 HC RX IP 250 OP 636: Performed by: STUDENT IN AN ORGANIZED HEALTH CARE EDUCATION/TRAINING PROGRAM

## 2021-06-19 PROCEDURE — 250N000009 HC RX 250

## 2021-06-19 PROCEDURE — 250N000013 HC RX MED GY IP 250 OP 250 PS 637: Performed by: SURGERY

## 2021-06-19 PROCEDURE — 97162 PT EVAL MOD COMPLEX 30 MIN: CPT | Mod: GP

## 2021-06-19 PROCEDURE — 84100 ASSAY OF PHOSPHORUS: CPT | Performed by: STUDENT IN AN ORGANIZED HEALTH CARE EDUCATION/TRAINING PROGRAM

## 2021-06-19 PROCEDURE — 83735 ASSAY OF MAGNESIUM: CPT | Performed by: STUDENT IN AN ORGANIZED HEALTH CARE EDUCATION/TRAINING PROGRAM

## 2021-06-19 PROCEDURE — 82330 ASSAY OF CALCIUM: CPT | Performed by: STUDENT IN AN ORGANIZED HEALTH CARE EDUCATION/TRAINING PROGRAM

## 2021-06-19 RX ORDER — METOPROLOL TARTRATE 25 MG/1
25 TABLET, FILM COATED ORAL 2 TIMES DAILY
Status: DISCONTINUED | OUTPATIENT
Start: 2021-06-19 | End: 2021-06-21

## 2021-06-19 RX ORDER — POTASSIUM CHLORIDE 750 MG/1
20 TABLET, EXTENDED RELEASE ORAL ONCE
Status: COMPLETED | OUTPATIENT
Start: 2021-06-19 | End: 2021-06-19

## 2021-06-19 RX ORDER — CALCIUM GLUCONATE 20 MG/ML
2 INJECTION, SOLUTION INTRAVENOUS ONCE
Status: COMPLETED | OUTPATIENT
Start: 2021-06-19 | End: 2021-06-19

## 2021-06-19 RX ORDER — FUROSEMIDE 10 MG/ML
40 INJECTION INTRAMUSCULAR; INTRAVENOUS ONCE
Status: COMPLETED | OUTPATIENT
Start: 2021-06-19 | End: 2021-06-19

## 2021-06-19 RX ORDER — POTASSIUM CHLORIDE 1.5 G/1.58G
20 POWDER, FOR SOLUTION ORAL ONCE
Status: DISCONTINUED | OUTPATIENT
Start: 2021-06-19 | End: 2021-06-19

## 2021-06-19 RX ADMIN — Medication 12.5 MG: at 10:00

## 2021-06-19 RX ADMIN — TORSEMIDE 20 MG: 20 TABLET ORAL at 08:25

## 2021-06-19 RX ADMIN — POTASSIUM CHLORIDE 20 MEQ: 750 TABLET, EXTENDED RELEASE ORAL at 10:51

## 2021-06-19 RX ADMIN — MUPIROCIN 0.5 G: 20 OINTMENT TOPICAL at 08:26

## 2021-06-19 RX ADMIN — ACETAMINOPHEN 650 MG: 325 TABLET, FILM COATED ORAL at 08:26

## 2021-06-19 RX ADMIN — Medication 2 MG/ML: at 21:33

## 2021-06-19 RX ADMIN — Medication 2 MG/ML: at 21:36

## 2021-06-19 RX ADMIN — POTASSIUM CHLORIDE 20 MEQ: 750 TABLET, EXTENDED RELEASE ORAL at 17:18

## 2021-06-19 RX ADMIN — FUROSEMIDE 40 MG: 10 INJECTION, SOLUTION INTRAVENOUS at 17:18

## 2021-06-19 RX ADMIN — MUPIROCIN 0.5 G: 20 OINTMENT TOPICAL at 21:24

## 2021-06-19 RX ADMIN — Medication 10 MG: at 21:00

## 2021-06-19 RX ADMIN — HEPARIN SODIUM 5000 UNITS: 5000 INJECTION, SOLUTION INTRAVENOUS; SUBCUTANEOUS at 08:24

## 2021-06-19 RX ADMIN — HEPARIN SODIUM 5000 UNITS: 5000 INJECTION, SOLUTION INTRAVENOUS; SUBCUTANEOUS at 17:18

## 2021-06-19 RX ADMIN — Medication 1 TABLET: at 08:24

## 2021-06-19 RX ADMIN — QUETIAPINE 50 MG: 50 TABLET ORAL at 20:59

## 2021-06-19 RX ADMIN — CALCIUM GLUCONATE 2 G: 20 INJECTION, SOLUTION INTRAVENOUS at 08:25

## 2021-06-19 RX ADMIN — HYDRALAZINE HYDROCHLORIDE 10 MG: 20 INJECTION INTRAMUSCULAR; INTRAVENOUS at 19:43

## 2021-06-19 RX ADMIN — PAROXETINE HYDROCHLORIDE 10 MG: 10 TABLET, FILM COATED ORAL at 08:24

## 2021-06-19 RX ADMIN — Medication 12.5 MG: at 08:24

## 2021-06-19 RX ADMIN — METOPROLOL TARTRATE 25 MG: 25 TABLET, FILM COATED ORAL at 20:59

## 2021-06-19 RX ADMIN — ASPIRIN 81 MG CHEWABLE TABLET 81 MG: 81 TABLET CHEWABLE at 08:24

## 2021-06-19 RX ADMIN — PANTOPRAZOLE SODIUM 40 MG: 40 TABLET, DELAYED RELEASE ORAL at 08:24

## 2021-06-19 ASSESSMENT — ACTIVITIES OF DAILY LIVING (ADL)
ADLS_ACUITY_SCORE: 18
ADLS_ACUITY_SCORE: 18
ADLS_ACUITY_SCORE: 17
ADLS_ACUITY_SCORE: 17
ADLS_ACUITY_SCORE: 19
ADLS_ACUITY_SCORE: 17

## 2021-06-19 NOTE — PLAN OF CARE
Transfer  Transferred from: 4E  Via:bed  Reason for transfer:Pt appropriate for 6C- improved patient condition  Family: Aware of transfer  Belongings: Sent with pt  Chart: Sent with pt  Medications: Meds from bin sent with pt  Report called from: LEXII Edouard on 4E

## 2021-06-19 NOTE — PROGRESS NOTES
Transferred to: Unit 6C at (1445)  Belongings: with pt  Self removed? Yes  Central line removed? Yes  Chart and medications sent with patient Yes  Family notified: Yes    Pt sent to 6C on bed with float float on cardiac monitor. Report given to 6C nurse Pearl.

## 2021-06-19 NOTE — PROGRESS NOTES
REGIONAL ANESTHESIA PAIN SERVICE CONTINUOUS NERVE INFUSION NOTE  June 18, 2021    Racquel Emmanuel is a 86 year old female with history of 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 Smith on 6/15/2021.  Prior to surgery RAPS placed bilateral T4-5 erector spinae (ES) catheters on 6/15/21 for analgesia.     Subjective and Interval History: Overnight events: extubated, now on nasal cannula.  Seen at 0910 and 0950.  Patient reports no pain. Adequate pain control with current nerve block infusion and analgesics (see below). Sitting up in chair.   Denies weakness, paresthesias, circumoral numbness, metallic taste or tinnitus.     Antithrombotic/Thrombolytic Therapy ordered:  heparin ANTICOAGULANT injection 5,000 Units, SC, Q8H       Analgesic Medications:   Medications related to Pain Management (From now, onward)    Start     Dose/Rate Route Frequency Ordered Stop    06/18/21 0000  acetaminophen (TYLENOL) tablet 650 mg      650 mg Oral EVERY 4 HOURS PRN 06/15/21 1717      06/16/21 1200  aspirin (ASA) chewable tablet 81 mg      81 mg Oral or Feeding Tube DAILY 06/16/21 1147      06/16/21 0830  docusate (COLACE) 50 MG/5ML liquid 100 mg      100 mg Oral or Feeding Tube 2 TIMES DAILY 06/16/21 0819      06/16/21 0800  polyethylene glycol (MIRALAX) Packet 17 g      17 g Oral DAILY 06/15/21 1717      06/15/21 2000  senna-docusate (SENOKOT-S/PERICOLACE) 8.6-50 MG per tablet 1 tablet      1 tablet Oral 2 TIMES DAILY 06/15/21 1717      06/15/21 2000  dexmedetomidine (PRECEDEX) 400 mcg in 0.9% sodium chloride 100 mL      0.2-1.2 mcg/kg/hr × 58.1 kg (Dosing Weight)  2.9-17.4 mL/hr  Intravenous CONTINUOUS 06/15/21 1948      06/15/21 4573  ropivacaine 0.2% in  NS perineural infusion simple       Perineural Continuous Nerve Block 06/15/21 1717      06/15/21 1730  ropivacaine 0.2% in NS perineural infusion simple       Perineural Continuous Nerve Block 06/15/21 1717      06/15/21 1730  acetaminophen (TYLENOL) tablet 975 mg      975 mg Oral EVERY 8 HOURS 06/15/21 1717 06/18/21 1729    06/15/21 1716  oxyCODONE IR (ROXICODONE) half-tab 2.5 mg      2.5 mg Oral EVERY 4 HOURS PRN 06/15/21 1717      06/15/21 1716  oxyCODONE (ROXICODONE) tablet 5 mg      5 mg Oral EVERY 4 HOURS PRN 06/15/21 1717      06/15/21 1716  diphenhydrAMINE (BENADRYL) solution 12.5 mg      12.5 mg Oral EVERY 6 HOURS PRN 06/15/21 1717      06/15/21 1716  diphenhydrAMINE (BENADRYL) injection 12.5 mg      12.5 mg Intravenous EVERY 6 HOURS PRN 06/15/21 1717      06/15/21 1716  magnesium hydroxide (MILK OF MAGNESIA) suspension 30 mL      30 mL Oral DAILY PRN 06/15/21 1717      06/15/21 1716  bisacodyl (DULCOLAX) Suppository 10 mg      10 mg Rectal DAILY PRN 06/15/21 1717      06/15/21 1716  sodium phosphate (FLEET ENEMA) 1 enema      1 enema Rectal ONCE PRN 06/15/21 1717      06/15/21 1716  HYDROmorphone (DILAUDID) injection 0.2 mg      0.2 mg Intravenous EVERY 2 HOURS PRN 06/15/21 1717      06/15/21 1716  methocarbamol (ROBAXIN) tablet 500 mg      500 mg Oral EVERY 6 HOURS PRN 06/15/21 1717      06/15/21 1716  lidocaine 1 % 0.1-1 mL      0.1-1 mL Other EVERY 1 HOUR PRN 06/15/21 1717      06/15/21 1716  lidocaine (LMX4) cream       Topical EVERY 1 HOUR PRN 06/15/21 1717             Objective:  Lab results  Recent Labs   Lab Test 06/18/21  0303   WBC 9.8   RBC 2.87*   HGB 8.8*   HCT 27.1*   MCV 94   MCH 30.7   MCHC 32.5   RDW 18.1*   PLT 61*       Lab Results   Component Value Date    INR 1.26 06/15/2021    INR 1.44 06/15/2021    INR 1.86 06/15/2021       Vitals:    Temp:  [97.6  F (36.4  C)-98.6  F (37  C)] 97.8  F (36.6  C)  Pulse:  [] 101  Resp:  [16-22] 18  BP: ()/(41-82) 128/64  SpO2:  [86 %-99 %]  96 %    Exam:   GEN: alert and no distress  NEURO/MSK: Moving UE and LE.  Strength LE intact and overall symmetric  SKIN: bilateral erector spinae (ES) catheter sites with dressing c/d/i, no tenderness, erythema, heme, edema    Assessment and Plan:     ASSESSMENT  Patient is receiving adequate analgesia with current multimodal therapy including 0.2% ropivacaine PIB (programmed intermittent bolus) at 14 mL Q 60 min at 7 mL/hr via each catheter. Pt is intubated and sedated.     PLAN  - Catheter Day #3 bilateral T4-5 ES catheters/infusion:    Continue Ropivacaine 0.2% PIB (programmed intermittent bolus) infusion at 7 mL Q 60 min via each catheter, total infusion 14 mL/hr, plan to maintain catheters while chest tubes in place, max of 7 days       Patient can be evaluated to receive local anesthetic bolus if needed Q 12 hr for pain, bedside RN should page RAPS to request bolus    Monitor platelet, yesterday 6/17/21 platelet count 84, 6/18/21 platelet count 61     - Antithrombotics/Thrombolytics ordered: heparin ANTICOAGULANT injection 5,000 Units, SC, Q8H    Okay to continue Heparin SQ as ordered by primary service  ? Please contact RAPS jobcode pager [phone] before ordering or making any medication changes      Follow up:      RAPS will continue to follow and adjust as needed    Jose Jurado MD    Department of Anesthesiology  Pain Management Division

## 2021-06-19 NOTE — PROGRESS NOTES
06/19/21 1500   Quick Adds   Type of Visit Initial PT Evaluation   Living Environment   People in home spouse   Current Living Arrangements house   Home Accessibility no concerns   Transportation Anticipated family or friend will provide   Living Environment Comments Pt lives with her  in a ranch style home w/ laundry on the main level. Pt has a tub shower w/ grab bars.   Self-Care   Usual Activity Tolerance good   Current Activity Tolerance moderate   Regular Exercise No   Equipment Currently Used at Home none   Activity/Exercise/Self-Care Comment has SEC at home   Disability/Function   Hearing Difficulty or Deaf no   Patient's preferred means of communication verbal   Wear Glasses or Blind yes   Concentrating, Remembering or Making Decisions Difficulty no   Difficulty Communicating no   Difficulty Eating/Swallowing no   Walking or Climbing Stairs Difficulty no   Dressing/Bathing Difficulty no   Toileting issues no   Doing Errands Independently Difficulty (such as shopping) no   Fall history within last six months no   Change in Functional Status Since Onset of Current Illness/Injury yes   General Information   Onset of Illness/Injury or Date of Surgery 06/15/21   Referring Physician Elenita Rivera MD   Patient/Family Therapy Goals Statement (PT) return home, avoid TCU   Pertinent History of Current Problem (include personal factors and/or comorbidities that impact the POC) 86 year old female with PMH of 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 Smith on 6/15/2021. Extubated 6/17.    Existing Precautions/Restrictions sternal   Heart Disease Risk Factors Age;Medical history   General Observations very pleasant and  agreeable to PT   Cognition   Orientation Status (Cognition) oriented x 3   Affect/Mental Status (Cognition) WFL   Follows Commands (Cognition) WFL   Posture    Posture Kyphosis   Range of Motion (ROM)   ROM Quick Adds ROM WFL   Strength   Manual Muscle Testing Quick Adds Strength WFL   Bed Mobility   Comment (Bed Mobility) supine to sit with IND, HOB elevated ~30 deg   Transfers   Transfer Safety Comments STS x2 with FWW and CGAx1.     Gait/Stairs (Locomotion)   Comment (Gait/Stairs) pregait performed forward and back x3 ft with FWW and CGA   Clinical Impression   Criteria for Skilled Therapeutic Intervention yes, treatment indicated   PT Diagnosis (PT) impaired functional mobility   Influenced by the following impairments prolonged bed rest, fatigue, impaired balance, generalized weakness   Clinical Presentation Stable/Uncomplicated   Clinical Presentation Rationale clinical judgement   Clinical Decision Making (Complexity) low complexity   Therapy Frequency (PT) 6x/week   Predicted Duration of Therapy Intervention (days/wks) 1 wk   Planned Therapy Interventions (PT) balance training;bed mobility training;gait training;home exercise program;neuromuscular re-education;stair training;strengthening;transfer training   Anticipated Equipment Needs at Discharge (PT) walker, rolling   Risk & Benefits of therapy have been explained evaluation/treatment results reviewed;care plan/treatment goals reviewed;risks/benefits reviewed;current/potential barriers reviewed;participants voiced agreement with care plan;participants included;patient   PT Discharge Planning    PT Discharge Recommendation (DC Rec) home with outpatient physical therapy;home with assist   PT Rationale for DC Rec pt is mobilizing below baseline but anticipate with continued therapy progress, pt will be able to d/c home with OP CR.    PT Brief overview of current status  transfer in room with Ax1 and FWW   Total Evaluation Time   Total Evaluation Time  (Minutes) 10

## 2021-06-19 NOTE — PROGRESS NOTES
CV ICU PROGRESS NOTE  June 19, 2021      CO-MORBIDITIES:  Ascending aortic aneurysm (H)  Ascending aortic aneurysm (H)  S/P AVR (22 mm bioprosthetic bovine valve) 6/3/2015 ([place])    Past Medical History:   Diagnosis Date     Anemia 4/5/2021     Antiplatelet or antithrombotic long-term use      Aortic aneurysm (H) 3/24/2021     Aortic insufficiency 3/24/2021     Arthritis      Ascending aortic aneurysm (H)      Clinical diagnosis of COVID-19; 7/2020 4/5/2021     Congestive heart failure (H)      Gastroesophageal reflux disease without esophagitis 4/5/2021     GI bleed; 8/2019 4/5/2021     History of cholecystectomy 4/5/2021     History of lobectomy of lung; 2/13/2017 ([place]) 4/5/2021     History of lung cancer, right middle lobe; 2/2017 4/5/2021     Hyperlipidemia LDL goal <130 4/5/2021     Hypertension      ICD (implantable cardioverter-defibrillator); 2015 4/5/2021     Large B-cell lymphoma 2014 4/5/2021     Macular degeneration 4/5/2021     Nonischemic cardiomyopathy (H) 3/24/2021     MIGUELANGEL (obstructive sleep apnea) 4/5/2021     Osteopenia 4/5/2021     Oxygen dependent     2.5 l via NC at night time only     Pacemaker      Paroxysmal atrial fibrillation (H) 4/5/2021     S/P AVR (22 mm bioprosthetic valve) 3/24/2021     Temporal giant cell arteritis (H) 4/5/2021     ASSESSMENT: Racquel Emmanuel is a 86 year old female with PMH of 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 Smith on 6/15/2021. Extubated 6/17.     TODAY'S PROGRESS:   - Increase to metop 25 BID  - Replace Calcium and Potassium today  - Remove MAC  - Transfer to floor        Neuro/pain/sedation:  Post-op pain   Chronic back pain  H/O subdural hematoma (2020), no residual  deficits  H/O of delirium  H/O PONV  - PTA paroxetine  - ES catheters in place w/ ropivacaine infusion, RAPS folowing  - scheduled APAP q8h  - Prn methocarbamol / oxycodone/ hydromorphone   - Seroquel 50mg at bedtime  - Scheduled melatonin at bedtime  - Delirium precautions     Pulmonary care:   Postoperative respiratory insufficiency  H/O Lung SCC s/p RML lobectomy   MIGUELANGEL  - Extubated 6/17  - Wean SpO2 for O2 sats >92%  - Keep chest tubes -20 cm H2O suction  - encourage incentive spirometer and acapella    Cardiovascular:  Non-ischemic cardiomyopathy (EF 55-60% 3/2021)  HFpEF  HTN/HLD  Post-operative cardiogenic vs vasoplegic shock  Paroxysmal atrial fibrillation s/p AICD  Hx Aortic Stenosis s/p bioprosthetic AVR (2015)  Ascending Aortic Aneurysm s/p repair with Dr. Smith 6/15/2021  - goal MAP >65, SBP <120  - Continue hold PTA warfarin, metoprolol, digoxin, losartan, statin, torsemide  - V Pacing wire, her Dual Lead ICD set at DDD 50  - Aspirin daily  - Increase Metoprolol 25mg BID  - Resume PTA lovastatin 40mg daily        GI care:  IBS  GERD  Hx GI bleed, resolved  - SLP evaluated- Regular diet  - bowel regimen  - PPI daily  - Antiemetics (zofran, compazine) PRN, given nausea with opioids      Renal/Fluid, Electrolytes, and nutrition:  CKD (baseline Cr 1.6)  Acute kidney injury  Lactic acidosis, resolved  - Lactate normalized  - Monitor intake and output.  - Trend BMP daily  - PTA Torsemide 20mg every day  - Replace Calcium and Potassium today      Endocrine:  Stress hyperglycemia  - hypoglycemia protocol  - Off insulin, hypoglycemic POD 2     ID/ Antibiotics:  - Perioperative antibiotics: clindamycin, vancomycin completed  - Continue mupirocin to complete 5-day course   - Trend WBC and fever curve      Heme:  Anemia  H/O B Cell lymphoma  Acute blood loss anemia, EBL 1L  Leukocytosis, improving  Thrombocytopenia, c/f HIT  Pre-op Hgb 10.3, post-op 12.8, now: 10.3  - Diffuse oozing intraop s/p  5u PRBC, 2u FFP, 2u  Plt, 650 Cellsaver, 1g Kcentra, 1g Fibryga  - Monitor CBC  - 6/17 HIT Screen- negative    Prophylaxis:  - SCD  - PPI  - SQH     Lines/ tubes/ drains:  - CVL- Left internal jugular MAC- REMOVE  - Chest tubes  --- Anterior mediastinal x2  --- Pleural x 2     Disposition:  - CVICU, Transfer to floor       Patient seen, findings and plan discussed with CV ICU staff, Dr. Patel.    Hilary Fong,   General Surgery, PGY3  [id number]      ====================================  SUBJECTIVE:  No acute issues overnight. Remains afebrile. Tolerating diet. Voiding adequately with diuretics. Stooling.     OBJECTIVE:   1. VITAL SIGNS:   Temp:  [97.6  F (36.4  C)-98.2  F (36.8  C)] 98.2  F (36.8  C)  Pulse:  [] 88  Resp:  [15-25] 18  BP: ()/(42-87) 118/85  SpO2:  [83 %-99 %] 97 %  Resp: 18    2. INTAKE/ OUTPUT:   I/O last 3 completed shifts:  In: 1010 [P.O.:960; I.V.:50]  Out: 1785 [Urine:1155; Other:250; Chest Tube:380]    3. PHYSICAL EXAMINATION:   General: NAD  Neuro: awake, follows commands  Resp: nonlabored on supplemental O2  CV: tachycardic  Chest tubes to suction, serosanguinous output, no air leak  Incisions: c/d/i  Abdomen: Soft, Non-distended  Extremities: warm and well perfused    4. INVESTIGATIONS:   Arterial Blood Gases   Recent Labs   Lab 06/17/21  0441 06/16/21  0302 06/16/21  0025 06/15/21  1929   PH 7.42 7.32* 7.32* 7.37   PCO2 41 44 43 42   PO2 88 137* 107* 217*   HCO3 26 23 22 24     Complete Blood Count   Recent Labs   Lab 06/19/21  0620 06/18/21  2311 06/18/21  0303 06/17/21  0441 06/16/21  0302   WBC 9.6  --  9.8 15.3* 24.4*   HGB 9.1*  --  8.8* 10.3* 13.1   PLT 78* 66* 61* 84* 196     Basic Metabolic Panel  Recent Labs   Lab 06/18/21  0303 06/17/21  1222 06/17/21  0441 06/16/21  1428 06/16/21  0302    146* 141  --  144   POTASSIUM 4.1 4.6 3.3*  --  3.9   CHLORIDE 108 114* 108  --  109   CO2 29 28 25  --  24   BUN 36* 36* 32*  --  24   CR 1.99* 2.01* 2.08*  --  1.48*   GLC 87 125* 143*  109* 172*     Liver Function Tests  Recent Labs   Lab 06/18/21  0303 06/17/21  0441 06/16/21  0302 06/15/21  2253 06/15/21  1710 06/15/21  1513   AST 16 22 52*  --  Canceled, Test credited  --    ALT 24 26 48  --  37  --    ALKPHOS 45 37* 35*  --  30*  --    BILITOTAL 0.6 0.9 0.5  --  1.2  --    ALBUMIN 2.1* 2.0* 2.5*  --  2.3*  --    INR  --   --   --  1.26* 1.44* 1.86*     Pancreatic Enzymes  No lab results found in last 7 days.  Coagulation Profile  Recent Labs   Lab 06/15/21  2253 06/15/21  1710 06/15/21  1513   INR 1.26* 1.44* 1.86*   PTT  --  119* >240*     5. RADIOLOGY:   Recent Results (from the past 24 hour(s))   XR Chest Port 1 View    Narrative    EXAM: XR CHEST PORT 1 VIEW  6/19/2021 3:33 AM      HISTORY: Post Op CVTS Surgery    COMPARISON: Previous day    TECHNIQUE: AP chest radiograph    FINDINGS:   Postoperative chest, stable support devices. Unchanged mediastinal  silhouette. Stable mixed interstitial and patchy airspace opacities.  Stable left greater than right pleural effusions overlying opacities.  No pneumothorax.      Impression    IMPRESSION:  1.  Stable support devices.  2.  Stable pulmonary opacities and effusions.    I have personally reviewed the examination and initial interpretation  and I agree with the findings.    RIOS SOTOMAYOR MD       =========================================

## 2021-06-20 ENCOUNTER — APPOINTMENT (OUTPATIENT)
Dept: GENERAL RADIOLOGY | Facility: CLINIC | Age: 86
DRG: 220 | End: 2021-06-20
Attending: SURGERY
Payer: MEDICARE

## 2021-06-20 ENCOUNTER — APPOINTMENT (OUTPATIENT)
Dept: PHYSICAL THERAPY | Facility: CLINIC | Age: 86
DRG: 220 | End: 2021-06-20
Attending: SURGERY
Payer: MEDICARE

## 2021-06-20 ENCOUNTER — APPOINTMENT (OUTPATIENT)
Dept: OCCUPATIONAL THERAPY | Facility: CLINIC | Age: 86
DRG: 220 | End: 2021-06-20
Attending: SURGERY
Payer: MEDICARE

## 2021-06-20 ENCOUNTER — APPOINTMENT (OUTPATIENT)
Dept: SPEECH THERAPY | Facility: CLINIC | Age: 86
DRG: 220 | End: 2021-06-20
Attending: SURGERY
Payer: MEDICARE

## 2021-06-20 LAB
ANION GAP SERPL CALCULATED.3IONS-SCNC: 6 MMOL/L (ref 3–14)
ANION GAP SERPL CALCULATED.3IONS-SCNC: 7 MMOL/L (ref 3–14)
BUN SERPL-MCNC: 32 MG/DL (ref 7–30)
BUN SERPL-MCNC: 32 MG/DL (ref 7–30)
CA-I BLD-MCNC: 4.2 MG/DL (ref 4.4–5.2)
CALCIUM SERPL-MCNC: 7.8 MG/DL (ref 8.5–10.1)
CALCIUM SERPL-MCNC: 8.2 MG/DL (ref 8.5–10.1)
CHLORIDE SERPL-SCNC: 103 MMOL/L (ref 94–109)
CHLORIDE SERPL-SCNC: 104 MMOL/L (ref 94–109)
CO2 SERPL-SCNC: 28 MMOL/L (ref 20–32)
CO2 SERPL-SCNC: 29 MMOL/L (ref 20–32)
CREAT SERPL-MCNC: 1.31 MG/DL (ref 0.52–1.04)
CREAT SERPL-MCNC: 1.43 MG/DL (ref 0.52–1.04)
ERYTHROCYTE [DISTWIDTH] IN BLOOD BY AUTOMATED COUNT: 17.2 % (ref 10–15)
GFR SERPL CREATININE-BSD FRML MDRD: 33 ML/MIN/{1.73_M2}
GFR SERPL CREATININE-BSD FRML MDRD: 37 ML/MIN/{1.73_M2}
GLUCOSE SERPL-MCNC: 116 MG/DL (ref 70–99)
GLUCOSE SERPL-MCNC: 175 MG/DL (ref 70–99)
HCT VFR BLD AUTO: 29.6 % (ref 35–47)
HGB BLD-MCNC: 9.4 G/DL (ref 11.7–15.7)
LACTATE BLD-SCNC: 1.5 MMOL/L (ref 0.7–2)
LACTATE BLD-SCNC: 2.2 MMOL/L (ref 0.7–2)
MAGNESIUM SERPL-MCNC: 2 MG/DL (ref 1.6–2.3)
MAGNESIUM SERPL-MCNC: 2.7 MG/DL (ref 1.6–2.3)
MAGNESIUM SERPL-MCNC: 6.6 MG/DL (ref 1.6–2.3)
MCH RBC QN AUTO: 30.7 PG (ref 26.5–33)
MCHC RBC AUTO-ENTMCNC: 31.8 G/DL (ref 31.5–36.5)
MCV RBC AUTO: 97 FL (ref 78–100)
PHOSPHATE SERPL-MCNC: 2.6 MG/DL (ref 2.5–4.5)
PLATELET # BLD AUTO: 133 10E9/L (ref 150–450)
POTASSIUM SERPL-SCNC: 3.6 MMOL/L (ref 3.4–5.3)
POTASSIUM SERPL-SCNC: 4.2 MMOL/L (ref 3.4–5.3)
RBC # BLD AUTO: 3.06 10E12/L (ref 3.8–5.2)
SODIUM SERPL-SCNC: 138 MMOL/L (ref 133–144)
SODIUM SERPL-SCNC: 139 MMOL/L (ref 133–144)
WBC # BLD AUTO: 7.6 10E9/L (ref 4–11)

## 2021-06-20 PROCEDURE — 250N000013 HC RX MED GY IP 250 OP 250 PS 637: Performed by: NURSE PRACTITIONER

## 2021-06-20 PROCEDURE — 250N000013 HC RX MED GY IP 250 OP 250 PS 637: Performed by: SURGERY

## 2021-06-20 PROCEDURE — 250N000013 HC RX MED GY IP 250 OP 250 PS 637: Performed by: PHYSICIAN ASSISTANT

## 2021-06-20 PROCEDURE — 94640 AIRWAY INHALATION TREATMENT: CPT

## 2021-06-20 PROCEDURE — 80048 BASIC METABOLIC PNL TOTAL CA: CPT | Performed by: PHYSICIAN ASSISTANT

## 2021-06-20 PROCEDURE — 82330 ASSAY OF CALCIUM: CPT | Performed by: SURGERY

## 2021-06-20 PROCEDURE — 250N000011 HC RX IP 250 OP 636: Performed by: STUDENT IN AN ORGANIZED HEALTH CARE EDUCATION/TRAINING PROGRAM

## 2021-06-20 PROCEDURE — 999N000157 HC STATISTIC RCP TIME EA 10 MIN

## 2021-06-20 PROCEDURE — 83735 ASSAY OF MAGNESIUM: CPT | Performed by: SURGERY

## 2021-06-20 PROCEDURE — 97530 THERAPEUTIC ACTIVITIES: CPT | Mod: GO

## 2021-06-20 PROCEDURE — 83605 ASSAY OF LACTIC ACID: CPT | Performed by: SURGERY

## 2021-06-20 PROCEDURE — 85027 COMPLETE CBC AUTOMATED: CPT | Performed by: SURGERY

## 2021-06-20 PROCEDURE — 250N000009 HC RX 250: Performed by: PHYSICIAN ASSISTANT

## 2021-06-20 PROCEDURE — 36415 COLL VENOUS BLD VENIPUNCTURE: CPT | Performed by: SURGERY

## 2021-06-20 PROCEDURE — 94640 AIRWAY INHALATION TREATMENT: CPT | Mod: 76

## 2021-06-20 PROCEDURE — 83735 ASSAY OF MAGNESIUM: CPT | Performed by: PHYSICIAN ASSISTANT

## 2021-06-20 PROCEDURE — 80048 BASIC METABOLIC PNL TOTAL CA: CPT | Performed by: SURGERY

## 2021-06-20 PROCEDURE — 97530 THERAPEUTIC ACTIVITIES: CPT | Mod: GP

## 2021-06-20 PROCEDURE — 214N000001 HC R&B CCU UMMC

## 2021-06-20 PROCEDURE — 71045 X-RAY EXAM CHEST 1 VIEW: CPT

## 2021-06-20 PROCEDURE — 97535 SELF CARE MNGMENT TRAINING: CPT | Mod: GO

## 2021-06-20 PROCEDURE — 250N000011 HC RX IP 250 OP 636: Performed by: PHYSICIAN ASSISTANT

## 2021-06-20 PROCEDURE — 71045 X-RAY EXAM CHEST 1 VIEW: CPT | Mod: 26 | Performed by: RADIOLOGY

## 2021-06-20 PROCEDURE — 92526 ORAL FUNCTION THERAPY: CPT | Mod: GN

## 2021-06-20 PROCEDURE — 36415 COLL VENOUS BLD VENIPUNCTURE: CPT | Performed by: PHYSICIAN ASSISTANT

## 2021-06-20 PROCEDURE — 97116 GAIT TRAINING THERAPY: CPT | Mod: GP

## 2021-06-20 PROCEDURE — 83605 ASSAY OF LACTIC ACID: CPT | Performed by: PHYSICIAN ASSISTANT

## 2021-06-20 PROCEDURE — 84100 ASSAY OF PHOSPHORUS: CPT | Performed by: SURGERY

## 2021-06-20 PROCEDURE — 250N000013 HC RX MED GY IP 250 OP 250 PS 637: Performed by: STUDENT IN AN ORGANIZED HEALTH CARE EDUCATION/TRAINING PROGRAM

## 2021-06-20 RX ORDER — IPRATROPIUM BROMIDE AND ALBUTEROL SULFATE 2.5; .5 MG/3ML; MG/3ML
3 SOLUTION RESPIRATORY (INHALATION)
Status: DISCONTINUED | OUTPATIENT
Start: 2021-06-20 | End: 2021-06-22

## 2021-06-20 RX ORDER — FUROSEMIDE 10 MG/ML
40 INJECTION INTRAMUSCULAR; INTRAVENOUS ONCE
Status: COMPLETED | OUTPATIENT
Start: 2021-06-20 | End: 2021-06-20

## 2021-06-20 RX ORDER — MAGNESIUM SULFATE HEPTAHYDRATE 40 MG/ML
2 INJECTION, SOLUTION INTRAVENOUS ONCE
Status: COMPLETED | OUTPATIENT
Start: 2021-06-20 | End: 2021-06-20

## 2021-06-20 RX ORDER — POTASSIUM CHLORIDE 750 MG/1
20 TABLET, EXTENDED RELEASE ORAL ONCE
Status: DISCONTINUED | OUTPATIENT
Start: 2021-06-20 | End: 2021-06-20

## 2021-06-20 RX ORDER — POTASSIUM CHLORIDE 750 MG/1
10 TABLET, EXTENDED RELEASE ORAL ONCE
Status: COMPLETED | OUTPATIENT
Start: 2021-06-20 | End: 2021-06-20

## 2021-06-20 RX ORDER — POTASSIUM CHLORIDE 750 MG/1
40 TABLET, EXTENDED RELEASE ORAL ONCE
Status: COMPLETED | OUTPATIENT
Start: 2021-06-20 | End: 2021-06-20

## 2021-06-20 RX ADMIN — HYDRALAZINE HYDROCHLORIDE 10 MG: 20 INJECTION INTRAMUSCULAR; INTRAVENOUS at 19:12

## 2021-06-20 RX ADMIN — POTASSIUM CHLORIDE 10 MEQ: 750 TABLET, EXTENDED RELEASE ORAL at 10:44

## 2021-06-20 RX ADMIN — MAGNESIUM SULFATE HEPTAHYDRATE 2 G: 40 INJECTION, SOLUTION INTRAVENOUS at 14:30

## 2021-06-20 RX ADMIN — Medication 10 MG: at 19:54

## 2021-06-20 RX ADMIN — HEPARIN SODIUM 5000 UNITS: 5000 INJECTION, SOLUTION INTRAVENOUS; SUBCUTANEOUS at 16:31

## 2021-06-20 RX ADMIN — HEPARIN SODIUM 5000 UNITS: 5000 INJECTION, SOLUTION INTRAVENOUS; SUBCUTANEOUS at 07:50

## 2021-06-20 RX ADMIN — Medication 1 TABLET: at 07:50

## 2021-06-20 RX ADMIN — QUETIAPINE 50 MG: 50 TABLET ORAL at 19:54

## 2021-06-20 RX ADMIN — HYDRALAZINE HYDROCHLORIDE 10 MG: 20 INJECTION INTRAMUSCULAR; INTRAVENOUS at 14:16

## 2021-06-20 RX ADMIN — IPRATROPIUM BROMIDE AND ALBUTEROL SULFATE 3 ML: .5; 3 SOLUTION RESPIRATORY (INHALATION) at 16:53

## 2021-06-20 RX ADMIN — ASPIRIN 81 MG CHEWABLE TABLET 81 MG: 81 TABLET CHEWABLE at 07:51

## 2021-06-20 RX ADMIN — DOCUSATE SODIUM 100 MG: 100 CAPSULE, LIQUID FILLED ORAL at 07:51

## 2021-06-20 RX ADMIN — PAROXETINE HYDROCHLORIDE 10 MG: 10 TABLET, FILM COATED ORAL at 09:29

## 2021-06-20 RX ADMIN — PANTOPRAZOLE SODIUM 40 MG: 40 TABLET, DELAYED RELEASE ORAL at 07:51

## 2021-06-20 RX ADMIN — OXYCODONE HYDROCHLORIDE 5 MG: 5 TABLET ORAL at 00:55

## 2021-06-20 RX ADMIN — ACETAMINOPHEN 650 MG: 325 TABLET, FILM COATED ORAL at 07:00

## 2021-06-20 RX ADMIN — MUPIROCIN 0.5 G: 20 OINTMENT TOPICAL at 09:29

## 2021-06-20 RX ADMIN — ACETAMINOPHEN 650 MG: 325 TABLET, FILM COATED ORAL at 19:50

## 2021-06-20 RX ADMIN — METOPROLOL TARTRATE 25 MG: 25 TABLET, FILM COATED ORAL at 07:50

## 2021-06-20 RX ADMIN — HYDRALAZINE HYDROCHLORIDE 10 MG: 20 INJECTION INTRAMUSCULAR; INTRAVENOUS at 09:03

## 2021-06-20 RX ADMIN — METOPROLOL TARTRATE 25 MG: 25 TABLET, FILM COATED ORAL at 19:54

## 2021-06-20 RX ADMIN — POTASSIUM CHLORIDE 40 MEQ: 750 TABLET, EXTENDED RELEASE ORAL at 14:02

## 2021-06-20 RX ADMIN — DOCUSATE SODIUM 50 MG AND SENNOSIDES 8.6 MG 1 TABLET: 8.6; 5 TABLET, FILM COATED ORAL at 07:51

## 2021-06-20 RX ADMIN — FUROSEMIDE 40 MG: 10 INJECTION, SOLUTION INTRAVENOUS at 14:03

## 2021-06-20 RX ADMIN — IPRATROPIUM BROMIDE AND ALBUTEROL SULFATE 3 ML: .5; 3 SOLUTION RESPIRATORY (INHALATION) at 20:47

## 2021-06-20 RX ADMIN — HEPARIN SODIUM 5000 UNITS: 5000 INJECTION, SOLUTION INTRAVENOUS; SUBCUTANEOUS at 00:55

## 2021-06-20 ASSESSMENT — ACTIVITIES OF DAILY LIVING (ADL)
ADLS_ACUITY_SCORE: 17
ADLS_ACUITY_SCORE: 18
ADLS_ACUITY_SCORE: 17
ADLS_ACUITY_SCORE: 17
ADLS_ACUITY_SCORE: 18
ADLS_ACUITY_SCORE: 18

## 2021-06-20 ASSESSMENT — MIFFLIN-ST. JEOR: SCORE: 990.25

## 2021-06-20 NOTE — PROGRESS NOTES
REGIONAL ANESTHESIA PAIN SERVICE CONTINUOUS NERVE INFUSION NOTE      Racquel Emmanuel is a 86 year old female with history of 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 Smith on 6/15/2021.  Prior to surgery RAPS placed bilateral T4-5 erector spinae (ES) catheters on 6/15/21 for analgesia.     Subjective and Interval History: No acute overnight events. Patient is doing well this morning. Reports no pain. Seen today chatting with  and daughter. Adequate pain control with current nerve block infusion and analgesics (see below). Denies weakness, paresthesias, circumoral numbness, metallic taste or tinnitus. Plan to transfer out of ICU today.     Antithrombotic/Thrombolytic Therapy ordered:  heparin ANTICOAGULANT injection 5,000 Units, SC, Q8H      Analgesic Medications:   Medications related to Pain Management (From now, onward)    Start     Dose/Rate Route Frequency Ordered Stop    06/18/21 0000  acetaminophen (TYLENOL) tablet 650 mg      650 mg Oral EVERY 4 HOURS PRN 06/15/21 1717      06/16/21 1200  aspirin (ASA) chewable tablet 81 mg      81 mg Oral or Feeding Tube DAILY 06/16/21 1147      06/16/21 0830  docusate (COLACE) 50 MG/5ML liquid 100 mg      100 mg Oral or Feeding Tube 2 TIMES DAILY 06/16/21 0819      06/16/21 0800  polyethylene glycol (MIRALAX) Packet 17 g      17 g Oral DAILY 06/15/21 1717      06/15/21 2000  senna-docusate (SENOKOT-S/PERICOLACE) 8.6-50 MG per tablet 1 tablet      1 tablet Oral 2 TIMES DAILY 06/15/21 1717      06/15/21 2000  dexmedetomidine (PRECEDEX) 400 mcg in 0.9% sodium chloride 100 mL      0.2-1.2 mcg/kg/hr × 58.1 kg (Dosing Weight)  2.9-17.4 mL/hr  Intravenous CONTINUOUS 06/15/21 1948      06/15/21  1730  ropivacaine 0.2% in NS perineural infusion simple       Perineural Continuous Nerve Block 06/15/21 1717      06/15/21 1730  ropivacaine 0.2% in NS perineural infusion simple       Perineural Continuous Nerve Block 06/15/21 1717      06/15/21 1730  acetaminophen (TYLENOL) tablet 975 mg      975 mg Oral EVERY 8 HOURS 06/15/21 1717 06/18/21 1729    06/15/21 1716  oxyCODONE IR (ROXICODONE) half-tab 2.5 mg      2.5 mg Oral EVERY 4 HOURS PRN 06/15/21 1717      06/15/21 1716  oxyCODONE (ROXICODONE) tablet 5 mg      5 mg Oral EVERY 4 HOURS PRN 06/15/21 1717      06/15/21 1716  diphenhydrAMINE (BENADRYL) solution 12.5 mg      12.5 mg Oral EVERY 6 HOURS PRN 06/15/21 1717      06/15/21 1716  diphenhydrAMINE (BENADRYL) injection 12.5 mg      12.5 mg Intravenous EVERY 6 HOURS PRN 06/15/21 1717      06/15/21 1716  magnesium hydroxide (MILK OF MAGNESIA) suspension 30 mL      30 mL Oral DAILY PRN 06/15/21 1717      06/15/21 1716  bisacodyl (DULCOLAX) Suppository 10 mg      10 mg Rectal DAILY PRN 06/15/21 1717      06/15/21 1716  sodium phosphate (FLEET ENEMA) 1 enema      1 enema Rectal ONCE PRN 06/15/21 1717      06/15/21 1716  HYDROmorphone (DILAUDID) injection 0.2 mg      0.2 mg Intravenous EVERY 2 HOURS PRN 06/15/21 1717      06/15/21 1716  methocarbamol (ROBAXIN) tablet 500 mg      500 mg Oral EVERY 6 HOURS PRN 06/15/21 1717      06/15/21 1716  lidocaine 1 % 0.1-1 mL      0.1-1 mL Other EVERY 1 HOUR PRN 06/15/21 1717      06/15/21 1716  lidocaine (LMX4) cream       Topical EVERY 1 HOUR PRN 06/15/21 1717             Objective:  Lab results  Recent Labs   Lab Test 06/18/21  0303   WBC 9.8   RBC 2.87*   HGB 8.8*   HCT 27.1*   MCV 94   MCH 30.7   MCHC 32.5   RDW 18.1*   PLT 61*       Lab Results   Component Value Date    INR 1.26 06/15/2021    INR 1.44 06/15/2021    INR 1.86 06/15/2021       Vitals:    Temp:  [36.7  C (98  F)-36.9  C (98.4  F)] 36.9  C (98.4  F)  Pulse:  [] 80  Resp:  [15-29] 16  BP: ()/(42-85)  139/68  SpO2:  [83 %-99 %] 97 %    Exam:   GEN: alert and no distress  NEURO/MSK: Moving UE and LE.  Strength LE intact and overall symmetric  SKIN: bilateral erector spinae (ES) catheter sites with dressing c/d/i, no tenderness, erythema, heme, edema    Assessment and Plan:     ASSESSMENT  Patient is receiving adequate analgesia with current multimodal therapy including 0.2% ropivacaine PIB (programmed intermittent bolus) at 14 mL Q 60 min at 7 mL/hr via each catheter. Meeting all goals. Denies any LAST symptoms.      PLAN  - Catheter Day #4 bilateral T4-5 ES catheters/infusion:    Continue Ropivacaine 0.2% PIB (programmed intermittent bolus) infusion at 7 mL Q 60 min via each catheter, total infusion 14 mL/hr, plan to maintain catheters while chest tubes in place, max of 7 days       Patient can be evaluated to receive local anesthetic bolus if needed Q 12 hr for pain, bedside RN should page RAPS to request bolus     - Antithrombotics/Thrombolytics ordered: heparin ANTICOAGULANT injection 5,000 Units, SC, Q8H    Okay to continue Heparin SQ as ordered by primary service  ? Please contact RAPS jobcode pager [phone] before ordering or making any medication changes      Follow up:    RAPS will continue to follow and adjust as needed  - discussed plan with attending anesthesiologist    Shila Aggarwal MD  [facility] Pain [facility]    RAPS Contact Info (24 hour job code pager is the last 4 digits) For in-house use only:   Job code ID: [id number] [id number]   [facility] 0599  Peds 0602  [email or web address] phone: dial * * * [phone], enter jobcode ID, then enter call-back number.    Text: Use [employer] on the Intranet <Paging/Directory> tab and enter Jobcode ID.   If no call back at any time, contact the hospital  and ask for RAPS attending or backup

## 2021-06-20 NOTE — PROGRESS NOTES
Sepsis Evaluation Progress Note    I was called to see Racquel Emmanuel due to abnormal vital signs triggering the Sepsis SIRS screening alert. She is not known to have an infection.     Physical Exam   Vital Signs:  Temp: 98  F (36.7  C) Temp src: Oral BP: 121/70 Pulse: 101   Resp: 24 SpO2: 97 % O2 Device: Nasal cannula Oxygen Delivery: 3 LPM     General: in no acute distress  Mental Status: AAOx4.     Remainder of physical exam is significant for sinus tachycardia , breathing comfortably, lungs CTA.     Data   Lactic Acid   Date Value Ref Range Status   06/18/2021 0.6 (L) 0.7 - 2.0 mmol/L Final   06/17/2021 0.9 0.7 - 2.0 mmol/L Final     Lactate for Sepsis Protocol   Date Value Ref Range Status   06/20/2021 2.2 (H) 0.7 - 2.0 mmol/L Final     Comment:     Significant result called to and read back by Mesfin Bridges 0840 6/20/21 by SHARDA       Assessment & Plan   NO EVIDENCE OF SEPSIS at this time.  Vital sign, physical exam, and lab findings are due to acute post-op period.    Disposition: The patient will remain on the current unit. We will continue to monitor this patient closely..  Cleveland Keyes PA-C    Sepsis Criteria   Sepsis: 2+ SIRS criteria due to infection  Severe Sepsis: Sepsis AND 1+ new sign of acute organ dysfunction (Note: lactate >2 or acute encephalopathy each qualify as organ dysfunction)  Septic Shock: Sepsis AND hypotension despite volume resuscitation with 30 ml/kg crystalloid or lactate >=4  Note: HYPOTENSION is defined as 2 BP readings measured 3 hrs apart that have a SBP <90, MAP <65, or decrease >40 mmHg, occurring 6 hrs before or after t-zero

## 2021-06-20 NOTE — PLAN OF CARE
D: S/p redo sternotomy ascending aortic aneurysm repair with Dr. Smith on 6/15/2021. Hx of NICM 2/2 lymphoma tx, aortic insufficiency (s/p bioprosthetic AVR 6/3/2015), HLD, pAfib (on warfarin), HTN, CKD, MIGUELANGEL, GERD (h/o GI bleed 2019), subdural hematoma, lung CA (s/p lobectomy 2017), temporal arteritis, DLBCL (2014), 8cm ascending aortic aneurysm now.    I: Monitored vitals and assessed pt status.   Changed: Transfer to .  Running: Spinal catheter 7 ml/hr x2.   PRN: Hydralazine for SBP >120   Tele: Sinus rhythm with PVCs.  O2: 3L.  Mobility: A:1 with walker.     A: A0x4. VSS on RA. Afebrile. Urinating adequately. No c/o pain.      P: Continue to monitor Pt status and report changes to CVTS.

## 2021-06-20 NOTE — PROGRESS NOTES
REGIONAL ANESTHESIA PAIN SERVICE CONTINUOUS NERVE INFUSION NOTE      Racquel Emmanuel is a 86 year old female with history of 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 Smith on 6/15/2021.  Prior to surgery RAPS placed bilateral T4-5 erector spinae (ES) catheters on 6/15/21 for analgesia.     Subjective and Interval History: No acute overnight events. Patient is doing well this morning. Reports pain with deep breathing. Adequate pain control with current nerve block infusion and analgesics (see below). Denies weakness, paresthesias, circumoral numbness, metallic taste or tinnitus.     Antithrombotic/Thrombolytic Therapy ordered:  heparin ANTICOAGULANT injection 5,000 Units, SC, Q8H      Analgesic Medications:   Medications related to Pain Management (From now, onward)    Start     Dose/Rate Route Frequency Ordered Stop    06/18/21 0000  acetaminophen (TYLENOL) tablet 650 mg      650 mg Oral EVERY 4 HOURS PRN 06/15/21 1717      06/16/21 1200  aspirin (ASA) chewable tablet 81 mg      81 mg Oral or Feeding Tube DAILY 06/16/21 1147      06/16/21 0830  docusate (COLACE) 50 MG/5ML liquid 100 mg      100 mg Oral or Feeding Tube 2 TIMES DAILY 06/16/21 0819      06/16/21 0800  polyethylene glycol (MIRALAX) Packet 17 g      17 g Oral DAILY 06/15/21 1717      06/15/21 2000  senna-docusate (SENOKOT-S/PERICOLACE) 8.6-50 MG per tablet 1 tablet      1 tablet Oral 2 TIMES DAILY 06/15/21 1717      06/15/21 2000  dexmedetomidine (PRECEDEX) 400 mcg in 0.9% sodium chloride 100 mL      0.2-1.2 mcg/kg/hr × 58.1 kg (Dosing Weight)  2.9-17.4 mL/hr  Intravenous CONTINUOUS 06/15/21 1948      06/15/21 1730  ropivacaine 0.2% in NS perineural infusion simple        Perineural Continuous Nerve Block 06/15/21 1717      06/15/21 1730  ropivacaine 0.2% in NS perineural infusion simple       Perineural Continuous Nerve Block 06/15/21 1717      06/15/21 1730  acetaminophen (TYLENOL) tablet 975 mg      975 mg Oral EVERY 8 HOURS 06/15/21 1717 06/18/21 1729    06/15/21 1716  oxyCODONE IR (ROXICODONE) half-tab 2.5 mg      2.5 mg Oral EVERY 4 HOURS PRN 06/15/21 1717      06/15/21 1716  oxyCODONE (ROXICODONE) tablet 5 mg      5 mg Oral EVERY 4 HOURS PRN 06/15/21 1717      06/15/21 1716  diphenhydrAMINE (BENADRYL) solution 12.5 mg      12.5 mg Oral EVERY 6 HOURS PRN 06/15/21 1717      06/15/21 1716  diphenhydrAMINE (BENADRYL) injection 12.5 mg      12.5 mg Intravenous EVERY 6 HOURS PRN 06/15/21 1717      06/15/21 1716  magnesium hydroxide (MILK OF MAGNESIA) suspension 30 mL      30 mL Oral DAILY PRN 06/15/21 1717      06/15/21 1716  bisacodyl (DULCOLAX) Suppository 10 mg      10 mg Rectal DAILY PRN 06/15/21 1717      06/15/21 1716  sodium phosphate (FLEET ENEMA) 1 enema      1 enema Rectal ONCE PRN 06/15/21 1717      06/15/21 1716  HYDROmorphone (DILAUDID) injection 0.2 mg      0.2 mg Intravenous EVERY 2 HOURS PRN 06/15/21 1717      06/15/21 1716  methocarbamol (ROBAXIN) tablet 500 mg      500 mg Oral EVERY 6 HOURS PRN 06/15/21 1717      06/15/21 1716  lidocaine 1 % 0.1-1 mL      0.1-1 mL Other EVERY 1 HOUR PRN 06/15/21 1717      06/15/21 1716  lidocaine (LMX4) cream       Topical EVERY 1 HOUR PRN 06/15/21 1717             Objective:  Lab results  Recent Labs   Lab Test 06/18/21  0303   WBC 9.8   RBC 2.87*   HGB 8.8*   HCT 27.1*   MCV 94   MCH 30.7   MCHC 32.5   RDW 18.1*   PLT 61*       Lab Results   Component Value Date    INR 1.26 06/15/2021    INR 1.44 06/15/2021    INR 1.86 06/15/2021       Vitals:    Temp:  [36.6  C (97.9  F)-36.9  C (98.4  F)] 36.7  C (98  F)  Pulse:  [] 101  Resp:  [15-29] 24  BP: (100-145)/(49-70) 125/61  SpO2:  [86 %-98 %] 97 %    Exam:   GEN: alert and no  distress  NEURO/MSK: Moving UE and LE.  Strength LE intact and overall symmetric  SKIN: bilateral erector spinae (ES) catheter sites with dressing c/d/i, no tenderness, erythema, heme, edema    Assessment and Plan:     ASSESSMENT  Patient is receiving adequate analgesia with current multimodal therapy including 0.2% ropivacaine PIB (programmed intermittent bolus) at 14 mL Q 60 min at 7 mL/hr via each catheter. Meeting all goals. Denies any LAST symptoms.      PLAN  - Catheter Day #4 bilateral T4-5 ES catheters/infusion:    Continue Ropivacaine 0.2% PIB (programmed intermittent bolus) infusion at 7 mL Q 60 min via each catheter, total infusion 14 mL/hr, plan to maintain catheters while chest tubes in place, max of 7 days    5ml bolus of 0.25% bupivacaine provided into each catheter, 10ml total. Patient observed for 10 minutes post bolus and remained vitally stable. RN advised to repeat vitals [id number] for 20 more minutes.       Patient can be evaluated to receive local anesthetic bolus if needed Q 12 hr for pain, bedside RN should page RAPS to request bolus     - Antithrombotics/Thrombolytics ordered: heparin ANTICOAGULANT injection 5,000 Units, SC, Q8H    Okay to continue Heparin SQ as ordered by primary service  ? Please contact RAPS jobcode pager [phone] before ordering or making any medication changes      Follow up:    RAPS will continue to follow and adjust as needed  - discussed plan with attending anesthesiologist    Arnie Garcia MD  [facility]    RAPS Contact Info (24 hour job code pager is the last 4 digits) For in-house use only:   Job code ID: [place] [id number]   [facility] 0599  [facility] 0602  [employer] phone: dial * * * [phone], enter jobcode ID, then enter call-back number.    Text: Use [email or web address] on the Intranet <Paging/Directory> tab and enter Jobcode ID.   If no call back at any time, contact the hospital  and ask for RAPS attending or backup

## 2021-06-20 NOTE — PROGRESS NOTES
Admitted  6/15 for 8cm ascending aortic aneurysm now s/p redo sternotomy ascending aortic aneurysm repair under [facility]. PMH NICM 2/2 lymphoma tx, aortic insufficiency (s/p bioprosthetic AVR 6/3/2015), HLD, pAfib (on warfarin), HTN, CKD, MIGUELANGEL, GERD (h/o GI bleed 2019), subdural hematoma, lung CA (s/p lobectomy 2017).     Neuro: A&Ox4.   Cardiac: Afebrile. SR with frequent PVC's (bigeminal/trigeminal)/PAC's. BP's elevated given PRN hydralazine x2 for SBP >120 per orders. Given dosing of lasix for fluid up.   Respiratory: 2-3L NC; MD ordered nebs d/t pt dyspneic at rest. Lung sounds clear, % O2.   GI/: Voiding spontaneously. 1 BM this shift.   Diet/appetite: Tolerating cardiac diet. Denies nausea   Activity: Up with stand-by assist    Pain: C/o incisional pain/abdomen. Given PRN tylenol; bolused by RAPS. 2 Spinal catheters (ropivacaine) 7mL/hr.   Skin: No new deficits noted.  Lines: PIV SL.   Drains: CTx3 to suction -20. Possible to pull some tubes today.   Replacement: potassium and magnesium    Pt has been resting comfortably, will continue to monitor and follow plan of care. CVTS.

## 2021-06-20 NOTE — PROGRESS NOTES
Cardiovascular Surgery Progress Note  06/20/2021           Assessment and Plan:   Racquel Emmanuel is a 86 year old female with a PMH of NICM 2/2 lymphoma tx, aortic insufficiency (s/p bioprosthetic AVR 6/3/2015), HLD, pAfib (on warfarin), HTN, CKD, MIGUELANGEL, GERD (h/o GI bleed 2019), subdural hematoma, lung CA (s/p lobectomy 2017), temporal arteritis, DLBCL (2014), 8cm ascending aortic aneurysm now s/p redo sternotomy ascending aortic aneurysm repair under [facility] with Dr. Smith on 6/15/2021.         Cardiovascular:   Ascending aortic aneurysm - s/p redo sternotomy and aortic aneuryrsm repair  Hx of bioprosthetic AVR in 2015  HFpEF  PFO   Most recent echo pre-op 3/1 showed LVEF 55-60%, mild to moderate TR, grade 2 diastolic dysfunction. ABEL post-op with stable moderate AI, along with PFO with left to right flow (not closed).   - ASA 81 mg, PTA statin, metoprolol 25 mg BID  - Holding PTA losartan given DAY[place]  - Chest tubes, medsx2, left pleural with moderate output, improving  Paroxysmal atrial fibrillation  - Holding PTA coumadin, can restart in a day or two per Dr. Smith  - Dual Lead ICD set at DDD 50  - TPW capped, remove today     Pulmonary:  H/O Lung SCC s/p RML lobectomy   MIGUELANGEL  Extubated POD 2; Saturating well on 3 lpm.  Encourage IS, C&DB, ambulating     Neuro/MSK:  Chronic back pain  H/O subdural hematoma (2020), no residual deficits  H/O of delirium  H/O PONV  Post-op pain  - PTA paroxetine  - ES catheters in place w/ ropivacaine infusion, RAPS folowing  - scheduled APAP q8h  - Prn methocarbamol / oxycodone/ hydromorphone   - Seroquel 50mg at bedtime  - Scheduled melatonin at bedtime  - Delirium precautions     /Renal:  CKD (baseline Cr 1.6)  Acute kidney injury  Pre-op weight 128 lbs, Creatinine today back at baseline.   Diuresis - restarted on PTA torsemide 20 mg PO daily 6/19, volume up, weight up 15 lbs, received lasix 40 mg IV x1 6/19 PM. Closer to baseline weight  - Repeat lasix 40 mg IV once this am,  consider transition to PO bumex tomorrow     GI/FEN:   IBS  GERD  Hx GI bleed, resolved  Regular diet, SLP evaluated  - PPI daily   - continue bowel regimen, + BM  - Replace lytes as needed     Endo:  Stress induced hyperglycemia   Managed on insulin drip postop, transitioned to sliding scale goal BG <180. BG well controlled     Heme:   H/O B Cell lymphoma  Acute blood loss anemia and thrombocytopenia  - HIT negative for thrombocytopenia  - Hgb stable; Plt improving, no signs or symptoms of bleeding     ID:  Stress induced leukocytosis   - Completed perioperative antibiotics  - WBC WNL, afebrile, no signs or symptoms of infection     Prophylaxis:   Stress ulcer prophylaxis: Pantoprazole 40 mg daily  DVT prophylaxis: Subcutaneous heparin, PCD     Dispo:   Transferred to  on 6/19  Rehab recommending discharge to TCU    Staff surgeons have been informed of changes through both verbal and written communication.         Cleveland Keyes PA-C  Cardiothoracic Surgery  p: [phone]          Interval History:   No acute events overnight. States pain is well managed on current regimen, Breathing is okay, does report some SPRAGUE after therapy today. Tolerating diet, is passing flatus, + BM. Denies chest pain, palpitations, dizziness, syncopal symptoms, chills, myalgias or sternal popping/clicking.          Medications:       aspirin  81 mg Oral or Feeding Tube Daily     docusate sodium  100 mg Oral BID     furosemide  40 mg Intravenous Once     heparin ANTICOAGULANT  5,000 Units Subcutaneous Q8H     lovastatin  40 mg Oral At Bedtime     melatonin  10 mg Oral or Feeding Tube QPM     metoprolol tartrate  25 mg Oral BID     multivitamin w/minerals  1 tablet Oral Daily     pantoprazole  40 mg Oral or NG Tube Daily    Or     pantoprazole  40 mg Oral Daily     PARoxetine  10 mg Oral QAM     polyethylene glycol  17 g Oral Daily     potassium chloride  40 mEq Oral Once     QUEtiapine  50 mg Oral or Feeding Tube QPM     senna-docusate  1  "tablet Oral BID     sodium chloride (PF)  3 mL Intracatheter Q8H     - MEDICATION INSTRUCTIONS -, acetaminophen, sore throat lozenge, bisacodyl, dextrose, glucose **OR** dextrose **OR** glucagon, diphenhydrAMINE **OR** diphenhydrAMINE, hydrALAZINE, HYDROmorphone, lidocaine 4%, lidocaine (buffered or not buffered), magnesium hydroxide, methocarbamol, naloxone **OR** naloxone **OR** naloxone **OR** naloxone, ondansetron **OR** ondansetron, oxyCODONE **OR** oxyCODONE, prochlorperazine **OR** prochlorperazine, BETA BLOCKER NOT PRESCRIBED, sodium chloride (PF), sodium phosphate          Physical Exam:   Vitals were reviewed  Blood pressure 127/75, pulse 86, temperature 98.3  F (36.8  C), temperature source Oral, resp. rate 22, height 1.575 m (5' 2\"), weight 59.7 kg (131 lb 9.8 oz), SpO2 98 %, not currently breastfeeding.  Vitals:    06/18/21 0600 06/18/21 2000 06/20/21 0700   Weight: 65 kg (143 lb 4.8 oz) 65.3 kg (143 lb 15.4 oz) 59.7 kg (131 lb 9.8 oz)      I/O last 3 completed shifts:  In: 1230 [P.O.:1130; I.V.:100]  Out: 2015 [Urine:1775; Chest Tube:240]    Gen: A&Ox4, NAD  CV: RRR, normal S1, S2, no murmurs, rubs or gallops   Pulm: Lungs diminished in bases, otherwise CTA, No wheezing or rhonchi  Abd: Soft, NT, ND, +BS  Ext: 1+ bilateral LE edema  Neuro:  Nonfocal  Incision: c/d/i, no erythema, sternum stable  Tubes/drains: Dressing clean and dry, 300cc serosanguinous output, no air leak.          Data:    All labs and imaging reviewed by me.  Labs:    Data   BMP  Recent Labs   Lab 06/20/21  0834 06/19/21  0620 06/18/21  0303 06/17/21  1222    142 143 146*   POTASSIUM 3.6 3.8 4.1 4.6   CHLORIDE 104 108 108 114*   CORNELIO 7.8* 7.9* 7.6* 7.6*   CO2 29 28 29 28   BUN 32* 38* 36* 36*   CR 1.43* 1.58* 1.99* 2.01*   * 105* 87 125*     CBC  Recent Labs   Lab 06/20/21  0834 06/19/21  0620 06/18/21  2311 06/18/21  0303 06/17/21  0441   WBC 7.6 9.6  --  9.8 15.3*   RBC 3.06* 2.95*  --  2.87* 3.30*   HGB 9.4* 9.1*  --  " 8.8* 10.3*   HCT 29.6* 28.3*  --  27.1* 30.4*   MCV 97 96  --  94 92   MCH 30.7 30.8  --  30.7 31.2   MCHC 31.8 32.2  --  32.5 33.9   RDW 17.2* 17.9*  --  18.1* 18.0*   * 78* 66* 61* 84*     INR  Recent Labs   Lab 06/15/21  2253 06/15/21  1710 06/15/21  1513   INR 1.26* 1.44* 1.86*      Hepatic Panel   Recent Labs   Lab 06/19/21  0620 06/18/21  0303 06/17/21  0441 06/16/21  0302   AST 24 16 22 52*   ALT 19 24 26 48   ALKPHOS 53 45 37* 35*   BILITOTAL 0.5 0.6 0.9 0.5   ALBUMIN 2.2* 2.1* 2.0* 2.5*     Glucose  Recent Labs   Lab 06/20/21  0834 06/19/21  0620 06/18/21  2312 06/18/21  0732 06/18/21  0405 06/18/21  0350 06/18/21  0303 06/17/21  2342 06/17/21  2121 06/17/21  1222 06/17/21  1222 06/17/21  0441 06/17/21  0441 06/16/21  1428 06/16/21  1428   * 105*  --   --   --   --  87  --   --   --  125*  --  143*  --  109*   BGM  --   --  106* 111* 156* 62*  --  93 116*   < >  --    < >  --    < >  --     < > = values in this interval not displayed.       Imaging:  Recent Results (from the past 24 hour(s))   XR Chest Port 1 View    Narrative    EXAM: XR CHEST PORT 1 VIEW  6/20/2021 10:37 AM     HISTORY:  Post Op CVTS Surgery       COMPARISON:  6/19/2021    FINDINGS:   Portable AP view of the chest. Postoperative chest with stable support  devices. Unchanged cardiomediastinal silhouette. Slightly increased  Interstitial and patchy airspace opacities. Stable left greater than  right pleural effusions with streaky opacities. No appreciable  pneumothorax.      Impression    IMPRESSION:   1. Stable postoperative chest with support devices.  2. Slightly increased pulmonary opacities, which may represent  atelectasis versus edema or infection.    I have personally reviewed the examination and initial interpretation  and I agree with the findings.    RIOS SOTOMAYOR MD

## 2021-06-21 ENCOUNTER — APPOINTMENT (OUTPATIENT)
Dept: SPEECH THERAPY | Facility: CLINIC | Age: 86
DRG: 220 | End: 2021-06-21
Attending: SURGERY
Payer: MEDICARE

## 2021-06-21 ENCOUNTER — APPOINTMENT (OUTPATIENT)
Dept: PHYSICAL THERAPY | Facility: CLINIC | Age: 86
DRG: 220 | End: 2021-06-21
Attending: SURGERY
Payer: MEDICARE

## 2021-06-21 ENCOUNTER — APPOINTMENT (OUTPATIENT)
Dept: GENERAL RADIOLOGY | Facility: CLINIC | Age: 86
DRG: 220 | End: 2021-06-21
Attending: NURSE PRACTITIONER
Payer: MEDICARE

## 2021-06-21 ENCOUNTER — APPOINTMENT (OUTPATIENT)
Dept: OCCUPATIONAL THERAPY | Facility: CLINIC | Age: 86
DRG: 220 | End: 2021-06-21
Attending: SURGERY
Payer: MEDICARE

## 2021-06-21 ENCOUNTER — APPOINTMENT (OUTPATIENT)
Dept: ULTRASOUND IMAGING | Facility: CLINIC | Age: 86
DRG: 220 | End: 2021-06-21
Attending: NURSE PRACTITIONER
Payer: MEDICARE

## 2021-06-21 LAB
ANION GAP SERPL CALCULATED.3IONS-SCNC: 4 MMOL/L (ref 3–14)
BUN SERPL-MCNC: 29 MG/DL (ref 7–30)
CA-I BLD-MCNC: 4.3 MG/DL (ref 4.4–5.2)
CALCIUM SERPL-MCNC: 7.7 MG/DL (ref 8.5–10.1)
CHLORIDE SERPL-SCNC: 105 MMOL/L (ref 94–109)
CO2 SERPL-SCNC: 31 MMOL/L (ref 20–32)
CREAT SERPL-MCNC: 1.42 MG/DL (ref 0.52–1.04)
ERYTHROCYTE [DISTWIDTH] IN BLOOD BY AUTOMATED COUNT: 17 % (ref 10–15)
GFR SERPL CREATININE-BSD FRML MDRD: 33 ML/MIN/{1.73_M2}
GLUCOSE SERPL-MCNC: 109 MG/DL (ref 70–99)
HCT VFR BLD AUTO: 25 % (ref 35–47)
HGB BLD-MCNC: 8.1 G/DL (ref 11.7–15.7)
INTERPRETATION ECG - MUSE: NORMAL
MAGNESIUM SERPL-MCNC: 2.3 MG/DL (ref 1.6–2.3)
MCH RBC QN AUTO: 31.2 PG (ref 26.5–33)
MCHC RBC AUTO-ENTMCNC: 32.4 G/DL (ref 31.5–36.5)
MCV RBC AUTO: 96 FL (ref 78–100)
MDC_IDC_LEAD_IMPLANT_DT: NORMAL
MDC_IDC_LEAD_LOCATION: NORMAL
MDC_IDC_LEAD_LOCATION_DETAIL_1: NORMAL
MDC_IDC_LEAD_MFG: NORMAL
MDC_IDC_LEAD_MODEL: NORMAL
MDC_IDC_LEAD_POLARITY_TYPE: NORMAL
MDC_IDC_LEAD_SERIAL: NORMAL
MDC_IDC_MSMT_BATTERY_REMAINING_LONGEVITY: 12 MO
MDC_IDC_MSMT_BATTERY_REMAINING_LONGEVITY: 12 MO
MDC_IDC_MSMT_BATTERY_STATUS: NORMAL
MDC_IDC_MSMT_CAP_CHARGE_DTM: NORMAL
MDC_IDC_MSMT_CAP_CHARGE_ENERGY: 0 J
MDC_IDC_MSMT_CAP_CHARGE_TIME: 0 S
MDC_IDC_MSMT_CAP_CHARGE_TIME: 13.29
MDC_IDC_MSMT_CAP_CHARGE_TYPE: NORMAL
MDC_IDC_MSMT_LEADCHNL_RA_IMPEDANCE_VALUE: 430 OHM
MDC_IDC_MSMT_LEADCHNL_RA_IMPEDANCE_VALUE: 480 OHM
MDC_IDC_MSMT_LEADCHNL_RA_PACING_THRESHOLD_AMPLITUDE: 1 V
MDC_IDC_MSMT_LEADCHNL_RA_PACING_THRESHOLD_AMPLITUDE: 1.1 V
MDC_IDC_MSMT_LEADCHNL_RA_PACING_THRESHOLD_PULSEWIDTH: 0.4 MS
MDC_IDC_MSMT_LEADCHNL_RA_PACING_THRESHOLD_PULSEWIDTH: 0.4 MS
MDC_IDC_MSMT_LEADCHNL_RA_SENSING_INTR_AMPL: 2.7 MV
MDC_IDC_MSMT_LEADCHNL_RA_SENSING_INTR_AMPL: 2.9 MV
MDC_IDC_MSMT_LEADCHNL_RV_IMPEDANCE_VALUE: 682 OHM
MDC_IDC_MSMT_LEADCHNL_RV_IMPEDANCE_VALUE: 682 OHM
MDC_IDC_MSMT_LEADCHNL_RV_PACING_THRESHOLD_AMPLITUDE: 0.8 V
MDC_IDC_MSMT_LEADCHNL_RV_PACING_THRESHOLD_AMPLITUDE: 1 V
MDC_IDC_MSMT_LEADCHNL_RV_PACING_THRESHOLD_PULSEWIDTH: 0.4 MS
MDC_IDC_MSMT_LEADCHNL_RV_PACING_THRESHOLD_PULSEWIDTH: 0.4 MS
MDC_IDC_MSMT_LEADCHNL_RV_SENSING_INTR_AMPL: 25 MV
MDC_IDC_MSMT_LEADCHNL_RV_SENSING_INTR_AMPL: 25 MV
MDC_IDC_PG_IMPLANT_DTM: NORMAL
MDC_IDC_PG_IMPLANT_DTM: NORMAL
MDC_IDC_PG_MFG: NORMAL
MDC_IDC_PG_MFG: NORMAL
MDC_IDC_PG_MODEL: NORMAL
MDC_IDC_PG_MODEL: NORMAL
MDC_IDC_PG_SERIAL: NORMAL
MDC_IDC_PG_SERIAL: NORMAL
MDC_IDC_PG_TYPE: NORMAL
MDC_IDC_PG_TYPE: NORMAL
MDC_IDC_SESS_CLINIC_NAME: NORMAL
MDC_IDC_SESS_CLINIC_NAME: NORMAL
MDC_IDC_SESS_DTM: NORMAL
MDC_IDC_SESS_DTM: NORMAL
MDC_IDC_SESS_TYPE: NORMAL
MDC_IDC_SESS_TYPE: NORMAL
MDC_IDC_SET_BRADY_AT_MODE_SWITCH_MODE: NORMAL
MDC_IDC_SET_BRADY_AT_MODE_SWITCH_MODE: NORMAL
MDC_IDC_SET_BRADY_AT_MODE_SWITCH_RATE: 170 {BEATS}/MIN
MDC_IDC_SET_BRADY_AT_MODE_SWITCH_RATE: 170 {BEATS}/MIN
MDC_IDC_SET_BRADY_HYSTRATE: NORMAL
MDC_IDC_SET_BRADY_HYSTRATE: NORMAL
MDC_IDC_SET_BRADY_LOWRATE: 50 {BEATS}/MIN
MDC_IDC_SET_BRADY_LOWRATE: 60 {BEATS}/MIN
MDC_IDC_SET_BRADY_MAX_SENSOR_RATE: 130 {BEATS}/MIN
MDC_IDC_SET_BRADY_MAX_SENSOR_RATE: 130 {BEATS}/MIN
MDC_IDC_SET_BRADY_MAX_TRACKING_RATE: 130 {BEATS}/MIN
MDC_IDC_SET_BRADY_MAX_TRACKING_RATE: 130 {BEATS}/MIN
MDC_IDC_SET_BRADY_MODE: NORMAL
MDC_IDC_SET_BRADY_MODE: NORMAL
MDC_IDC_SET_BRADY_PAV_DELAY_HIGH: 220 MS
MDC_IDC_SET_BRADY_PAV_DELAY_HIGH: 220 MS
MDC_IDC_SET_BRADY_PAV_DELAY_LOW: 300 MS
MDC_IDC_SET_BRADY_PAV_DELAY_LOW: 300 MS
MDC_IDC_SET_BRADY_SAV_DELAY_HIGH: 220 MS
MDC_IDC_SET_BRADY_SAV_DELAY_HIGH: 220 MS
MDC_IDC_SET_BRADY_SAV_DELAY_LOW: 300 MS
MDC_IDC_SET_BRADY_SAV_DELAY_LOW: 300 MS
MDC_IDC_SET_LEADCHNL_RA_PACING_AMPLITUDE: 2.8 V
MDC_IDC_SET_LEADCHNL_RA_PACING_AMPLITUDE: 2.8 V
MDC_IDC_SET_LEADCHNL_RA_PACING_CAPTURE_MODE: NORMAL
MDC_IDC_SET_LEADCHNL_RA_PACING_CAPTURE_MODE: NORMAL
MDC_IDC_SET_LEADCHNL_RA_PACING_POLARITY: NORMAL
MDC_IDC_SET_LEADCHNL_RA_PACING_POLARITY: NORMAL
MDC_IDC_SET_LEADCHNL_RA_PACING_PULSEWIDTH: 0.4 MS
MDC_IDC_SET_LEADCHNL_RA_PACING_PULSEWIDTH: 0.4 MS
MDC_IDC_SET_LEADCHNL_RA_SENSING_ADAPTATION_MODE: NORMAL
MDC_IDC_SET_LEADCHNL_RA_SENSING_ADAPTATION_MODE: NORMAL
MDC_IDC_SET_LEADCHNL_RA_SENSING_POLARITY: NORMAL
MDC_IDC_SET_LEADCHNL_RA_SENSING_POLARITY: NORMAL
MDC_IDC_SET_LEADCHNL_RA_SENSING_SENSITIVITY: 0.25 MV
MDC_IDC_SET_LEADCHNL_RA_SENSING_SENSITIVITY: 0.25 MV
MDC_IDC_SET_LEADCHNL_RV_PACING_AMPLITUDE: 2 V
MDC_IDC_SET_LEADCHNL_RV_PACING_AMPLITUDE: 2.4 V
MDC_IDC_SET_LEADCHNL_RV_PACING_CAPTURE_MODE: NORMAL
MDC_IDC_SET_LEADCHNL_RV_PACING_CAPTURE_MODE: NORMAL
MDC_IDC_SET_LEADCHNL_RV_PACING_POLARITY: NORMAL
MDC_IDC_SET_LEADCHNL_RV_PACING_POLARITY: NORMAL
MDC_IDC_SET_LEADCHNL_RV_PACING_PULSEWIDTH: 0.4 MS
MDC_IDC_SET_LEADCHNL_RV_PACING_PULSEWIDTH: 0.4 MS
MDC_IDC_SET_LEADCHNL_RV_SENSING_ADAPTATION_MODE: NORMAL
MDC_IDC_SET_LEADCHNL_RV_SENSING_ADAPTATION_MODE: NORMAL
MDC_IDC_SET_LEADCHNL_RV_SENSING_POLARITY: NORMAL
MDC_IDC_SET_LEADCHNL_RV_SENSING_POLARITY: NORMAL
MDC_IDC_SET_LEADCHNL_RV_SENSING_SENSITIVITY: 0.6 MV
MDC_IDC_SET_LEADCHNL_RV_SENSING_SENSITIVITY: 0.6 MV
MDC_IDC_SET_ZONE_DETECTION_INTERVAL: 286 MS
MDC_IDC_SET_ZONE_DETECTION_INTERVAL: 286 MS
MDC_IDC_SET_ZONE_DETECTION_INTERVAL: 333 MS
MDC_IDC_SET_ZONE_DETECTION_INTERVAL: 333 MS
MDC_IDC_SET_ZONE_TYPE: NORMAL
MDC_IDC_SET_ZONE_VENDOR_TYPE: NORMAL
MDC_IDC_STAT_AT_BURDEN_PERCENT: 1 %
MDC_IDC_STAT_AT_BURDEN_PERCENT: 1 %
MDC_IDC_STAT_BRADY_RA_PERCENT_PACED: 1 %
MDC_IDC_STAT_BRADY_RA_PERCENT_PACED: 1 %
MDC_IDC_STAT_BRADY_RV_PERCENT_PACED: 1 %
MDC_IDC_STAT_BRADY_RV_PERCENT_PACED: 1 %
MDC_IDC_STAT_EPISODE_RECENT_COUNT: 0
MDC_IDC_STAT_EPISODE_RECENT_COUNT: 13
MDC_IDC_STAT_EPISODE_RECENT_COUNT: 55
MDC_IDC_STAT_EPISODE_RECENT_COUNT_DTM_END: NORMAL
MDC_IDC_STAT_EPISODE_RECENT_COUNT_DTM_START: NORMAL
MDC_IDC_STAT_EPISODE_TOTAL_COUNT: 0
MDC_IDC_STAT_EPISODE_TOTAL_COUNT: 33
MDC_IDC_STAT_EPISODE_TOTAL_COUNT: 4
MDC_IDC_STAT_EPISODE_TOTAL_COUNT_DTM_END: NORMAL
MDC_IDC_STAT_EPISODE_TYPE: NORMAL
MDC_IDC_STAT_EPISODE_VENDOR_TYPE: NORMAL
MDC_IDC_STAT_TACHYTHERAPY_ATP_DELIVERED_RECENT: 0
MDC_IDC_STAT_TACHYTHERAPY_ATP_DELIVERED_TOTAL: 3
MDC_IDC_STAT_TACHYTHERAPY_RECENT_DTM_END: NORMAL
MDC_IDC_STAT_TACHYTHERAPY_RECENT_DTM_START: NORMAL
MDC_IDC_STAT_TACHYTHERAPY_SHOCKS_ABORTED_RECENT: 0
MDC_IDC_STAT_TACHYTHERAPY_SHOCKS_ABORTED_TOTAL: 1
MDC_IDC_STAT_TACHYTHERAPY_SHOCKS_DELIVERED_RECENT: 0
MDC_IDC_STAT_TACHYTHERAPY_SHOCKS_DELIVERED_TOTAL: 0
MDC_IDC_STAT_TACHYTHERAPY_TOTAL_DTM_END: NORMAL
PHOSPHATE SERPL-MCNC: 3 MG/DL (ref 2.5–4.5)
PLATELET # BLD AUTO: 121 10E9/L (ref 150–450)
POTASSIUM SERPL-SCNC: 4.3 MMOL/L (ref 3.4–5.3)
RADIOLOGIST FLAGS: ABNORMAL
RBC # BLD AUTO: 2.6 10E12/L (ref 3.8–5.2)
SODIUM SERPL-SCNC: 140 MMOL/L (ref 133–144)
WBC # BLD AUTO: 5.3 10E9/L (ref 4–11)

## 2021-06-21 PROCEDURE — 250N000013 HC RX MED GY IP 250 OP 250 PS 637: Performed by: STUDENT IN AN ORGANIZED HEALTH CARE EDUCATION/TRAINING PROGRAM

## 2021-06-21 PROCEDURE — 97535 SELF CARE MNGMENT TRAINING: CPT | Mod: GO

## 2021-06-21 PROCEDURE — 84100 ASSAY OF PHOSPHORUS: CPT | Performed by: SURGERY

## 2021-06-21 PROCEDURE — 71045 X-RAY EXAM CHEST 1 VIEW: CPT | Mod: 26 | Performed by: RADIOLOGY

## 2021-06-21 PROCEDURE — 97116 GAIT TRAINING THERAPY: CPT | Mod: GP | Performed by: REHABILITATION PRACTITIONER

## 2021-06-21 PROCEDURE — 214N000001 HC R&B CCU UMMC

## 2021-06-21 PROCEDURE — 71045 X-RAY EXAM CHEST 1 VIEW: CPT

## 2021-06-21 PROCEDURE — 94640 AIRWAY INHALATION TREATMENT: CPT | Mod: 76

## 2021-06-21 PROCEDURE — 250N000011 HC RX IP 250 OP 636: Performed by: SURGERY

## 2021-06-21 PROCEDURE — 999N000127 HC STATISTIC PERIPHERAL IV START W US GUIDANCE

## 2021-06-21 PROCEDURE — 250N000013 HC RX MED GY IP 250 OP 250 PS 637: Performed by: SURGERY

## 2021-06-21 PROCEDURE — 93971 EXTREMITY STUDY: CPT | Mod: 26 | Performed by: RADIOLOGY

## 2021-06-21 PROCEDURE — 93971 EXTREMITY STUDY: CPT | Mod: LT

## 2021-06-21 PROCEDURE — 36415 COLL VENOUS BLD VENIPUNCTURE: CPT | Performed by: SURGERY

## 2021-06-21 PROCEDURE — 85027 COMPLETE CBC AUTOMATED: CPT | Performed by: SURGERY

## 2021-06-21 PROCEDURE — 250N000009 HC RX 250

## 2021-06-21 PROCEDURE — 97530 THERAPEUTIC ACTIVITIES: CPT | Mod: GP | Performed by: REHABILITATION PRACTITIONER

## 2021-06-21 PROCEDURE — 250N000011 HC RX IP 250 OP 636: Performed by: STUDENT IN AN ORGANIZED HEALTH CARE EDUCATION/TRAINING PROGRAM

## 2021-06-21 PROCEDURE — 83735 ASSAY OF MAGNESIUM: CPT | Performed by: SURGERY

## 2021-06-21 PROCEDURE — 250N000009 HC RX 250: Performed by: PHYSICIAN ASSISTANT

## 2021-06-21 PROCEDURE — 94640 AIRWAY INHALATION TREATMENT: CPT

## 2021-06-21 PROCEDURE — 92526 ORAL FUNCTION THERAPY: CPT | Mod: GN | Performed by: SPEECH-LANGUAGE PATHOLOGIST

## 2021-06-21 PROCEDURE — 99207 PR NO CHARGE LOS: CPT | Performed by: NURSE PRACTITIONER

## 2021-06-21 PROCEDURE — 250N000013 HC RX MED GY IP 250 OP 250 PS 637: Performed by: NURSE PRACTITIONER

## 2021-06-21 PROCEDURE — 82330 ASSAY OF CALCIUM: CPT | Performed by: SURGERY

## 2021-06-21 PROCEDURE — 80048 BASIC METABOLIC PNL TOTAL CA: CPT | Performed by: SURGERY

## 2021-06-21 RX ORDER — METOPROLOL TARTRATE 50 MG
50 TABLET ORAL 2 TIMES DAILY
Status: DISCONTINUED | OUTPATIENT
Start: 2021-06-21 | End: 2021-06-24

## 2021-06-21 RX ORDER — HYDRALAZINE HYDROCHLORIDE 20 MG/ML
10 INJECTION INTRAMUSCULAR; INTRAVENOUS EVERY 30 MIN PRN
Status: DISCONTINUED | OUTPATIENT
Start: 2021-06-21 | End: 2021-06-26 | Stop reason: HOSPADM

## 2021-06-21 RX ORDER — BUMETANIDE 2 MG/1
2 TABLET ORAL ONCE
Status: COMPLETED | OUTPATIENT
Start: 2021-06-21 | End: 2021-06-21

## 2021-06-21 RX ADMIN — IPRATROPIUM BROMIDE AND ALBUTEROL SULFATE 3 ML: .5; 3 SOLUTION RESPIRATORY (INHALATION) at 20:47

## 2021-06-21 RX ADMIN — BUMETANIDE 2 MG: 2 TABLET ORAL at 09:49

## 2021-06-21 RX ADMIN — IPRATROPIUM BROMIDE AND ALBUTEROL SULFATE 3 ML: .5; 3 SOLUTION RESPIRATORY (INHALATION) at 11:53

## 2021-06-21 RX ADMIN — IPRATROPIUM BROMIDE AND ALBUTEROL SULFATE 3 ML: .5; 3 SOLUTION RESPIRATORY (INHALATION) at 09:11

## 2021-06-21 RX ADMIN — PAROXETINE HYDROCHLORIDE 10 MG: 10 TABLET, FILM COATED ORAL at 09:51

## 2021-06-21 RX ADMIN — HEPARIN SODIUM 5000 UNITS: 5000 INJECTION, SOLUTION INTRAVENOUS; SUBCUTANEOUS at 00:28

## 2021-06-21 RX ADMIN — DOCUSATE SODIUM 100 MG: 100 CAPSULE, LIQUID FILLED ORAL at 09:53

## 2021-06-21 RX ADMIN — QUETIAPINE 50 MG: 50 TABLET ORAL at 22:28

## 2021-06-21 RX ADMIN — Medication 2 MG/ML: at 09:54

## 2021-06-21 RX ADMIN — ONDANSETRON 4 MG: 4 TABLET, ORALLY DISINTEGRATING ORAL at 13:56

## 2021-06-21 RX ADMIN — METOPROLOL TARTRATE 50 MG: 50 TABLET, FILM COATED ORAL at 22:27

## 2021-06-21 RX ADMIN — ACETAMINOPHEN 650 MG: 325 TABLET, FILM COATED ORAL at 06:21

## 2021-06-21 RX ADMIN — METOPROLOL TARTRATE 50 MG: 50 TABLET, FILM COATED ORAL at 09:49

## 2021-06-21 RX ADMIN — Medication 10 MG: at 22:28

## 2021-06-21 RX ADMIN — LOVASTATIN 40 MG: 20 TABLET ORAL at 00:27

## 2021-06-21 RX ADMIN — PANTOPRAZOLE SODIUM 40 MG: 40 TABLET, DELAYED RELEASE ORAL at 09:52

## 2021-06-21 RX ADMIN — ACETAMINOPHEN 650 MG: 325 TABLET, FILM COATED ORAL at 22:27

## 2021-06-21 RX ADMIN — ASPIRIN 81 MG CHEWABLE TABLET 81 MG: 81 TABLET CHEWABLE at 09:52

## 2021-06-21 RX ADMIN — Medication 1 TABLET: at 09:52

## 2021-06-21 RX ADMIN — HYDRALAZINE HYDROCHLORIDE 10 MG: 20 INJECTION INTRAMUSCULAR; INTRAVENOUS at 06:02

## 2021-06-21 RX ADMIN — OXYCODONE HYDROCHLORIDE 5 MG: 5 TABLET ORAL at 03:28

## 2021-06-21 RX ADMIN — LOVASTATIN 40 MG: 20 TABLET ORAL at 22:28

## 2021-06-21 ASSESSMENT — MIFFLIN-ST. JEOR: SCORE: 1042.25

## 2021-06-21 ASSESSMENT — ACTIVITIES OF DAILY LIVING (ADL)
ADLS_ACUITY_SCORE: 19
DEPENDENT_IADLS:: TRANSPORTATION
ADLS_ACUITY_SCORE: 19
ADLS_ACUITY_SCORE: 19

## 2021-06-21 NOTE — CONSULTS
Care Management Initial Consult    General Information  Assessment completed with: Patient,    Type of CM/SW Visit: Initial Assessment    Primary Care Provider verified and updated as needed: Yes   Readmission within the last 30 days: no previous admission in last 30 days         Advance Care Planning: Advance Care Planning Reviewed: verified with patient(Requested copy)          Communication Assessment  Patient's communication style: spoken language (English or Bilingual)    Hearing Difficulty or Deaf: no   Wear Glasses or Blind: yes    Cognitive  Cognitive/Neuro/Behavioral: WDL  Level of Consciousness: alert  Arousal Level: opens eyes spontaneously  Orientation: oriented x 4  Mood/Behavior: calm, cooperative  Best Language: 0 - No aphasia  Speech: logical, spontaneous, clear    Living Environment:   People in home: spouse     Current living Arrangements: house      Able to return to prior arrangements: (TBD)  Living Arrangement Comments: (Daughter is planning on staying with patient post discharge.)    Family/Social Support:  Care provided by: spouse/significant other  Provides care for: no one  Marital Status:              Description of Support System: Spouse and family        Current Resources:   Patient receiving home care services: No     Community Resources: None  Equipment currently used at home: none  Supplies currently used at home: Oxygen Tubing/Supplies, grab bars in shower    Employment/Financial:  Employment Status:          Financial Concerns: No concerns identified           Lifestyle & Psychosocial Needs:        Socioeconomic History     Marital status:      Spouse name: Not on file     Number of children: Not on file     Years of education: Not on file     Highest education level: Not on file     Tobacco Use     Smoking status: Former Smoker     Quit date: 1990     Years since quittin.4     Smokeless tobacco: Never Used   Substance and Sexual Activity     Alcohol use: Not  Currently     Drug use: Not Currently       Functional Status:  Prior to admission patient needed assistance:   Dependent ADLs:: Independent  Dependent IADLs:: Transportation       Values/Beliefs:  Spiritual, Cultural Beliefs, [employer] Practices, Values that affect care:   Description of Beliefs that Will Affect Care: Krystyna            Additional Information:  s/p redo sternotomy ascending aortic aneurysm repair on 6/15/2021.    Met with patient to complete initial assessment. Patient lives at home with her spouse in [place], [place]. She is independent with exception of transportation. She uses home O2 at night and has a portable tank. Patient would like RN/HHA/PT/OT through [facility] or [facility] if she is able to discharge home. Therapy is currently recommending TCU, SW to follow up. Per patient, her daughter is able to transport at discharge. Care management will continue to monitor progression of care, review team recommendations and provide discharge planning assist as needed.       Henny Tsang RNCC

## 2021-06-21 NOTE — PROGRESS NOTES
REGIONAL ANESTHESIA PAIN SERVICE FOLLOW UP NOTE  Racquel Emmanuel is a 86 year old female with PMH of 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 Smith on 6/15/2021.  Prior to surgery RAPS placed bilateral T4-5 erector spinae (ES) catheters for analgesia. Pain adequately controlled with multimodal analgesia. Motor function intact and no objective evidence of adverse side effects related to local anesthetic continuous infusion.     Subjective and Interval History: Overnight no acute events. Chest tubes removed this AM  Patient denies pain. Denies weakness, paresthesias, circumoral numbness, metallic taste or tinnitus.  Ambulating with standby assistance.  Nausea after eating today.    Antithrombotic/Thrombolytic Therapy: Last dose Heparin SQ administered today at 0028     Medications related to Pain Management (From now, onward)    Start     Dose/Rate Route Frequency Ordered Stop    06/18/21 0900  docusate sodium (COLACE) capsule 100 mg      100 mg Oral 2 TIMES DAILY 06/18/21 0836      06/18/21 0000  acetaminophen (TYLENOL) tablet 650 mg      650 mg Oral EVERY 4 HOURS PRN 06/15/21 1717 06/16/21 1200  aspirin (ASA) chewable tablet 81 mg      81 mg Oral or Feeding Tube DAILY 06/16/21 1147      06/16/21 0800  polyethylene glycol (MIRALAX) Packet 17 g      17 g Oral DAILY 06/15/21 1717      06/15/21 2000  senna-docusate (SENOKOT-S/PERICOLACE) 8.6-50 MG per tablet 1 tablet      1 tablet Oral 2 TIMES DAILY 06/15/21 1717      06/15/21 1716  oxyCODONE IR (ROXICODONE) half-tab 2.5 mg      2.5 mg Oral EVERY 4 HOURS PRN 06/15/21 1717      06/15/21 1716  oxyCODONE (ROXICODONE) tablet 5 mg      5 mg Oral EVERY 4 HOURS PRN 06/15/21 1717      06/15/21  "1716  diphenhydrAMINE (BENADRYL) solution 12.5 mg      12.5 mg Oral EVERY 6 HOURS PRN 06/15/21 1717      06/15/21 1716  diphenhydrAMINE (BENADRYL) injection 12.5 mg      12.5 mg Intravenous EVERY 6 HOURS PRN 06/15/21 1717      06/15/21 1716  magnesium hydroxide (MILK OF MAGNESIA) suspension 30 mL      30 mL Oral DAILY PRN 06/15/21 1717      06/15/21 1716  bisacodyl (DULCOLAX) Suppository 10 mg      10 mg Rectal DAILY PRN 06/15/21 1717      06/15/21 1716  sodium phosphate (FLEET ENEMA) 1 enema      1 enema Rectal ONCE PRN 06/15/21 1717      06/15/21 1716  HYDROmorphone (DILAUDID) injection 0.2 mg      0.2 mg Intravenous EVERY 2 HOURS PRN 06/15/21 1717      06/15/21 1716  methocarbamol (ROBAXIN) tablet 500 mg      500 mg Oral EVERY 6 HOURS PRN 06/15/21 1717      06/15/21 1716  lidocaine 1 % 0.1-1 mL      0.1-1 mL Other EVERY 1 HOUR PRN 06/15/21 1717      06/15/21 1716  lidocaine (LMX4) cream       Topical EVERY 1 HOUR PRN 06/15/21 1717              Objective  Lab Results     Recent Labs   Lab Test 06/21/21  0644   WBC 5.3   RBC 2.60*   HGB 8.1*   HCT 25.0*   MCV 96   MCH 31.2   MCHC 32.4   RDW 17.0*   *     Lab Results   Component Value Date    INR 1.26 06/15/2021    INR 1.44 06/15/2021    INR 1.86 06/15/2021    INR 1.09 06/03/2021       /70 (BP Location: Right arm)   Pulse 97   Temp 98  F (36.7  C) (Oral)   Resp 18   Ht 1.575 m (5' 2\")   Wt 64.9 kg (143 lb 1.3 oz)   SpO2 98%   BMI 26.17 kg/m       Exam:  Constitutional: alert and no distress  Neuro/MSK:  Full strength BLE and overall symmetric  Skin: erector spinae (ES) catheter sites c/d/i, no tenderness, erythema, heme, edema.      Assessment  Patient currently achieving adequate pain control with erector spinae (ES) catheter/infusion and multimodal analgesia.  No weakness or paresthesias. No evidence of adverse side effects associated with local anesthetic.     Plan    Time 1330. Bilateral nerve catheters removed without complication, dark tips " intact.  Insertion sites c/d/i. Small bandage applied    Okay to administer Heparin SQ greater than 1 hour after catheter removal.    o Primary service and Bedside RN aware of plans.    RAPS will sign off    Thank you for allowing us the opportunity to participate in the care of Racquel Emmanuel  - discussed plan with attending anesthesiologist    CLEMENT [facility]   Regional Anesthesia Pain Service      RAPS Contact Info (for in-house use only):  Job code ID: [id number]   [facility] [address]  [facility] [address]  [place] phone: dial * * * [phone], enter jobcode ID, then enter call-back number.    Text: Use [employer] on the Intranet <Paging/Directory> tab and enter Jobcode ID.   If no call back at any time, contact the hospital  and ask for RAPS attending or backup

## 2021-06-21 NOTE — PROGRESS NOTES
SPIRITUAL HEALTH SERVICES Progress Note  Unit 6C    Met with pt as follow-up. Stated things were going well and that things were healing up. They were now just waiting to wait and see. Asked from prayer for son who is going through chemotherapy and for his daughters through this time.     Plan: Will visit late in week if still on unit.     Marija Jarvis   Intern  Pager [phone]

## 2021-06-21 NOTE — PROGRESS NOTES
Cardiovascular Surgery Progress Note  06/21/2021           Assessment and Plan:   Racquel Emmanuel is a 86 year old female with a PMH of NICM 2/2 lymphoma tx, aortic insufficiency (s/p bioprosthetic AVR 6/3/2015), HLD, pAfib (on warfarin), HTN, CKD, MIGUELANGEL, GERD (h/o GI bleed 2019), subdural hematoma, lung CA (s/p lobectomy 2017), temporal arteritis, DLBCL (2014), 8cm ascending aortic aneurysm now s/p redo sternotomy ascending aortic aneurysm repair under [facility] with Dr. Smith on 6/15/2021.      Cardiovascular:   Ascending aortic aneurysm - s/p redo sternotomy and aortic aneuryrsm repair  Hx of bioprosthetic AVR in 2015  HFpEF  PFO   Most recent echo pre-op 3/1 showed LVEF 55-60%, mild to moderate TR, grade 2 diastolic dysfunction. ABEL post-op with stable moderate AI, along with PFO with left to right flow (not closed).   - ASA 81 mg, PTA statin. Will increase metoprolol from 25 mg BID to 50 mg BID given continued hypertension and tachycardia.  - Holding PTA losartan given [place]. Will re-evaluate tomorrow for possible resumption.   - Chest tubes, medsx2, left pleural with moderate output. Plan to pull today (6/21) given decreased output.   Paroxysmal atrial fibrillation  - Holding PTA coumadin, can restart in a 1-2 days per Dr. Smith   - Dual Lead ICD set at DDD 50  - TPW removed 6/20     Pulmonary:  H/O Lung SCC s/p RML lobectomy   MIGUELANGEL  Extubated POD 2; Saturating well on 3 lpm. Wean as tolerated. Only on home O2 at night  Encourage IS, C&DB, ambulating     Neuro/MSK:  Chronic back pain  H/O subdural hematoma (2020), no residual deficits  H/O of delirium  H/O PONV  Post-op pain  - PTA paroxetine  - ES catheters in place w/ ropivacaine infusion, RAPS folowing  - scheduled APAP q8h  - Prn methocarbamol / oxycodone/ hydromorphone   - Seroquel 50mg at bedtime  - Scheduled melatonin at bedtime  - Delirium precautions  Left arm swelling  Generalized edema to left arm and forearm that began immediatly post-op. No loss of  sensation or pain. Ecchymosis noted on left arm just distal to armpit. Will get US to evaluate. Keep elevated.      /Renal:  CKD (baseline Cr 1.6)  Acute kidney injury  Pre-op weight 128 lbs, Creatinine 1.42.   Diuresis - restarted on PTA torsemide 20 mg PO daily 6/19, received lasix 40 mg IV x1 6/19 PM and 6/20 afternoon.   - Transition to 2mg PO bumex today     GI/FEN:   IBS  GERD  Hx GI bleed, resolved  Regular diet, SLP evaluated  - PPI daily   - continue bowel regimen, + BM  - Replace lytes as needed     Endo:  Stress induced hyperglycemia   Managed on insulin drip postop, transitioned to sliding scale goal BG <180. BG well controlled     Heme:   H/O B Cell lymphoma  Acute blood loss anemia and thrombocytopenia  - HIT negative for thrombocytopenia  - Hgb stable; Plt improving, possible hematoma noted on L arm. Will continue to monitor.      ID:  Stress induced leukocytosis   - Completed perioperative antibiotics  - WBC WNL, afebrile, no signs or symptoms of infection     Prophylaxis:   Stress ulcer prophylaxis: Pantoprazole 40 mg daily  DVT prophylaxis: Subcutaneous heparin, PCD     Dispo:   Transferred to  on 6/19  Rehab recommending discharge to home with outpatient therapies     Staff surgeons have been informed of changes through both verbal and written communication.       CARMEN Johnston-S  Cardiothoracic Surgery    I, Elizabeth Castanon, was present with the PA student who participated in the service and in the documentation of the note.  I have verified the history and personally performed the physical exam and medical decision making.  I agree with the assessment and plan of care as documented in the note.      CLEMENT Wilkins, ACN-AG, CCRN  Nurse Practitioner  Cardiothoracic Surgery  Pager: [phone]  Date of Service (when I saw the patient): 06/21/21          Interval History:   No acute events overnight. States pain is well managed on current regimen. Breathing improving however notes  "continued SPRAGUE but states this is her baseline.  Notes L arm swelling that started post-operatively. Denies pain or change in sensation, however also notes bruising to L underarm.   Tolerating diet, is passing flatus, + BM. Denies chest pain, palpitations, dizziness, syncopal symptoms, chills, myalgias or sternal popping/clicking.          Medications:       aspirin  81 mg Oral or Feeding Tube Daily     docusate sodium  100 mg Oral BID     [Held by provider] heparin ANTICOAGULANT  5,000 Units Subcutaneous Q8H     ipratropium - albuterol 0.5 mg/2.5 mg/3 mL  3 mL Nebulization 4x daily     lovastatin  40 mg Oral At Bedtime     melatonin  10 mg Oral or Feeding Tube QPM     metoprolol tartrate  25 mg Oral BID     multivitamin w/minerals  1 tablet Oral Daily     pantoprazole  40 mg Oral or NG Tube Daily    Or     pantoprazole  40 mg Oral Daily     PARoxetine  10 mg Oral QAM     polyethylene glycol  17 g Oral Daily     QUEtiapine  50 mg Oral or Feeding Tube QPM     senna-docusate  1 tablet Oral BID     sodium chloride (PF)  3 mL Intracatheter Q8H     - MEDICATION INSTRUCTIONS -, acetaminophen, sore throat lozenge, bisacodyl, dextrose, glucose **OR** dextrose **OR** glucagon, diphenhydrAMINE **OR** diphenhydrAMINE, hydrALAZINE, HYDROmorphone, lidocaine 4%, lidocaine (buffered or not buffered), magnesium hydroxide, methocarbamol, naloxone **OR** naloxone **OR** naloxone **OR** naloxone, ondansetron **OR** ondansetron, oxyCODONE **OR** oxyCODONE, prochlorperazine **OR** prochlorperazine, BETA BLOCKER NOT PRESCRIBED, sodium chloride (PF), sodium phosphate          Physical Exam:   Vitals were reviewed  Blood pressure (!) 133/90, pulse 106, temperature 98.2  F (36.8  C), temperature source Oral, resp. rate 18, height 1.575 m (5' 2\"), weight 64.9 kg (143 lb 1.3 oz), SpO2 97 %, not currently breastfeeding.  Vitals:    06/18/21 2000 06/20/21 0700 06/21/21 0230   Weight: 65.3 kg (143 lb 15.4 oz) 59.7 kg (131 lb 9.8 oz) 64.9 kg (143 " lb 1.3 oz)      I/O last 3 completed shifts:  In: 705 [P.O.:705]  Out: 1385 [Urine:1195; Chest Tube:190]    Gen: A&Ox4, NAD  CV: RRR, normal S1, S2, no murmurs, rubs or gallops   Pulm: Lungs diminished in bases, otherwise CTA, No wheezing or rhonchi  Abd: Soft, NT, ND, +BS  Ext: 1+ bilateral LE edema, 2+ edema to right arm with ecchymosis to LUE just distal to armpit   Neuro:  Nonfocal  Incision: c/d/i, no erythema, sternum stable  Tubes/drains: Dressing clean and dry, 300cc serosanguinous output, no air leak.          Data:    All labs and imaging reviewed by me.  Labs:    Data   BMP  Recent Labs   Lab 06/21/21  0644 06/20/21  1635 06/20/21  0834 06/19/21  0620    138 139 142   POTASSIUM 4.3 4.2 3.6 3.8   CHLORIDE 105 103 104 108   CORNELIO 7.7* 8.2* 7.8* 7.9*   CO2 31 28 29 28   BUN 29 32* 32* 38*   CR 1.42* 1.31* 1.43* 1.58*   * 116* 175* 105*     CBC  Recent Labs   Lab 06/21/21  0644 06/20/21  0834 06/19/21  0620 06/18/21  2311 06/18/21  0303   WBC 5.3 7.6 9.6  --  9.8   RBC 2.60* 3.06* 2.95*  --  2.87*   HGB 8.1* 9.4* 9.1*  --  8.8*   HCT 25.0* 29.6* 28.3*  --  27.1*   MCV 96 97 96  --  94   MCH 31.2 30.7 30.8  --  30.7   MCHC 32.4 31.8 32.2  --  32.5   RDW 17.0* 17.2* 17.9*  --  18.1*   * 133* 78* 66* 61*     INR  Recent Labs   Lab 06/15/21  2253 06/15/21  1710 06/15/21  1513   INR 1.26* 1.44* 1.86*      Hepatic Panel   Recent Labs   Lab 06/19/21  0620 06/18/21  0303 06/17/21  0441 06/16/21  0302   AST 24 16 22 52*   ALT 19 24 26 48   ALKPHOS 53 45 37* 35*   BILITOTAL 0.5 0.6 0.9 0.5   ALBUMIN 2.2* 2.1* 2.0* 2.5*     Glucose  Recent Labs   Lab 06/21/21  0644 06/20/21  1635 06/20/21  0834 06/19/21  0620 06/18/21  2312 06/18/21  0732 06/18/21  0405 06/18/21  0350 06/18/21  0303 06/17/21  2342 06/17/21  2121 06/17/21  1222 06/17/21  1222   * 116* 175* 105*  --   --   --   --  87  --   --   --  125*   BGM  --   --   --   --  106* 111* 156* 62*  --  93 116*   < >  --     < > = values in this  interval not displayed.       Imaging:  Recent Results (from the past 24 hour(s))   XR Chest Port 1 View    Narrative    EXAM: XR CHEST PORT 1 VIEW  6/20/2021 10:37 AM     HISTORY:  Post Op CVTS Surgery       COMPARISON:  6/19/2021    FINDINGS:   Portable AP view of the chest. Postoperative chest with stable support  devices. Unchanged cardiomediastinal silhouette. Slightly increased  Interstitial and patchy airspace opacities. Stable left greater than  right pleural effusions with streaky opacities. No appreciable  pneumothorax.      Impression    IMPRESSION:   1. Stable postoperative chest with support devices.  2. Slightly increased pulmonary opacities, which may represent  atelectasis versus edema or infection.    I have personally reviewed the examination and initial interpretation  and I agree with the findings.    RIOS SOTOMAYOR MD

## 2021-06-21 NOTE — PLAN OF CARE
Patient A&Ox4. On 3L of NC with nebs. Pt. is dyspneic at rest. PRN hydralazine given for SBP > 120 per orders. Pt. HR ranges 80's-120. SR/ST with frequent PVC's/PAC's. Voids frequently using 1x assist to BS commode. Tolerating cardiac diet. Up to chair with 1x assist. C/o incision pain/abdomen. Tylenol given x 2 and oxycodone given x1. New PIV replaced this AM and SL. CTx3 to suction -20. CT may be removed today. 2 spinal catheters (ropivacaine) 7mL/hr. No overnight events. Continue to monitor electrolytes and follow POC.

## 2021-06-21 NOTE — PROGRESS NOTES
REGIONAL ANESTHESIA PAIN SERVICE FOLLOW UP NOTE  Racquel Emmanuel is a 86 year old female with PMH of 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 Smith on 6/15/2021.  Prior to surgery RAPS placed bilateral T4-5 erector spinae (ES) catheters for analgesia. Pain adequately controlled with multimodal analgesia. Motor function intact and no objective evidence of adverse side effects related to local anesthetic continuous infusion.     Subjective and Interval History: Overnight no acute events. Chest tubes removed this AM  Patient denies pain. Denies weakness, paresthesias, circumoral numbness, metallic taste or tinnitus.  Ambulating with standby assistance.  Nausea after eating today.    Antithrombotic/Thrombolytic Therapy: Last dose Heparin SQ administered today at 0028     Medications related to Pain Management (From now, onward)    Start     Dose/Rate Route Frequency Ordered Stop    06/18/21 0900  docusate sodium (COLACE) capsule 100 mg      100 mg Oral 2 TIMES DAILY 06/18/21 0836      06/18/21 0000  acetaminophen (TYLENOL) tablet 650 mg      650 mg Oral EVERY 4 HOURS PRN 06/15/21 1717 06/16/21 1200  aspirin (ASA) chewable tablet 81 mg      81 mg Oral or Feeding Tube DAILY 06/16/21 1147      06/16/21 0800  polyethylene glycol (MIRALAX) Packet 17 g      17 g Oral DAILY 06/15/21 1717      06/15/21 2000  senna-docusate (SENOKOT-S/PERICOLACE) 8.6-50 MG per tablet 1 tablet      1 tablet Oral 2 TIMES DAILY 06/15/21 1717      06/15/21 1716  oxyCODONE IR (ROXICODONE) half-tab 2.5 mg      2.5 mg Oral EVERY 4 HOURS PRN 06/15/21 1717      06/15/21 1716  oxyCODONE (ROXICODONE) tablet 5 mg      5 mg Oral EVERY 4 HOURS PRN 06/15/21 1717      06/15/21  "1716  diphenhydrAMINE (BENADRYL) solution 12.5 mg      12.5 mg Oral EVERY 6 HOURS PRN 06/15/21 1717      06/15/21 1716  diphenhydrAMINE (BENADRYL) injection 12.5 mg      12.5 mg Intravenous EVERY 6 HOURS PRN 06/15/21 1717      06/15/21 1716  magnesium hydroxide (MILK OF MAGNESIA) suspension 30 mL      30 mL Oral DAILY PRN 06/15/21 1717      06/15/21 1716  bisacodyl (DULCOLAX) Suppository 10 mg      10 mg Rectal DAILY PRN 06/15/21 1717      06/15/21 1716  sodium phosphate (FLEET ENEMA) 1 enema      1 enema Rectal ONCE PRN 06/15/21 1717      06/15/21 1716  HYDROmorphone (DILAUDID) injection 0.2 mg      0.2 mg Intravenous EVERY 2 HOURS PRN 06/15/21 1717      06/15/21 1716  methocarbamol (ROBAXIN) tablet 500 mg      500 mg Oral EVERY 6 HOURS PRN 06/15/21 1717      06/15/21 1716  lidocaine 1 % 0.1-1 mL      0.1-1 mL Other EVERY 1 HOUR PRN 06/15/21 1717      06/15/21 1716  lidocaine (LMX4) cream       Topical EVERY 1 HOUR PRN 06/15/21 1717              Objective  Lab Results     Recent Labs   Lab Test 06/21/21  0644   WBC 5.3   RBC 2.60*   HGB 8.1*   HCT 25.0*   MCV 96   MCH 31.2   MCHC 32.4   RDW 17.0*   *     Lab Results   Component Value Date    INR 1.26 06/15/2021    INR 1.44 06/15/2021    INR 1.86 06/15/2021    INR 1.09 06/03/2021       /74 (BP Location: Right arm)   Pulse 95   Temp 98.3  F (36.8  C) (Oral)   Resp 18   Ht 1.575 m (5' 2\")   Wt 64.9 kg (143 lb 1.3 oz)   SpO2 94%   BMI 26.17 kg/m       Exam:  Constitutional: alert and no distress  Neuro/MSK:  Full strength BLE and overall symmetric  Skin: erector spinae (ES) catheter sites c/d/i, no tenderness, erythema, heme, edema.      Assessment  Patient currently achieving adequate pain control with erector spinae (ES) catheter/infusion and multimodal analgesia.  No weakness or paresthesias. No evidence of adverse side effects associated with local anesthetic.     Plan    Time 1330. Bilateral nerve catheters removed without complication, dark " tips intact.  Insertion sites c/d/i. Small bandage applied    Okay to administer Heparin SQ greater than 1 hour after catheter removal.    o Primary service and Bedside RN aware of plans.    RAPS will sign off    Jose Jurado MD    Department of Anesthesiology  Pain Management Division

## 2021-06-21 NOTE — PLAN OF CARE
Speech Language Therapy Discharge Summary    Reason for therapy discharge:    All goals and outcomes met, no further needs identified.    Progress towards therapy goal(s). See goals on Care Plan in [facility] electronic health record for goal details.  Goals met    Therapy recommendation(s):    No further therapy is recommended.    Recommend continue regular diet with thin liquids given swallow strategies (fully upright position, small bites/sips, slow rate, alternate consistencies). Patient has met swallow related goals. SLP to sign off. Please re-consult SLP if new swallowing concerns should arise.

## 2021-06-22 ENCOUNTER — APPOINTMENT (OUTPATIENT)
Dept: OCCUPATIONAL THERAPY | Facility: CLINIC | Age: 86
DRG: 220 | End: 2021-06-22
Attending: SURGERY
Payer: MEDICARE

## 2021-06-22 ENCOUNTER — APPOINTMENT (OUTPATIENT)
Dept: PHYSICAL THERAPY | Facility: CLINIC | Age: 86
DRG: 220 | End: 2021-06-22
Attending: SURGERY
Payer: MEDICARE

## 2021-06-22 ENCOUNTER — APPOINTMENT (OUTPATIENT)
Dept: GENERAL RADIOLOGY | Facility: CLINIC | Age: 86
DRG: 220 | End: 2021-06-22
Attending: NURSE PRACTITIONER
Payer: MEDICARE

## 2021-06-22 LAB
ANION GAP SERPL CALCULATED.3IONS-SCNC: 1 MMOL/L (ref 3–14)
BUN SERPL-MCNC: 26 MG/DL (ref 7–30)
CA-I BLD-MCNC: 4.3 MG/DL (ref 4.4–5.2)
CALCIUM SERPL-MCNC: 7.8 MG/DL (ref 8.5–10.1)
CHLORIDE SERPL-SCNC: 106 MMOL/L (ref 94–109)
CO2 SERPL-SCNC: 31 MMOL/L (ref 20–32)
CREAT SERPL-MCNC: 1.33 MG/DL (ref 0.52–1.04)
ERYTHROCYTE [DISTWIDTH] IN BLOOD BY AUTOMATED COUNT: 17 % (ref 10–15)
GFR SERPL CREATININE-BSD FRML MDRD: 36 ML/MIN/{1.73_M2}
GLUCOSE SERPL-MCNC: 93 MG/DL (ref 70–99)
HCT VFR BLD AUTO: 25.8 % (ref 35–47)
HGB BLD-MCNC: 8 G/DL (ref 11.7–15.7)
INR PPP: 1.04 (ref 0.86–1.14)
LABORATORY COMMENT REPORT: NORMAL
MAGNESIUM SERPL-MCNC: 2.2 MG/DL (ref 1.6–2.3)
MCH RBC QN AUTO: 30.4 PG (ref 26.5–33)
MCHC RBC AUTO-ENTMCNC: 31 G/DL (ref 31.5–36.5)
MCV RBC AUTO: 98 FL (ref 78–100)
PHOSPHATE SERPL-MCNC: 3.7 MG/DL (ref 2.5–4.5)
PLATELET # BLD AUTO: 133 10E9/L (ref 150–450)
POTASSIUM SERPL-SCNC: 4.2 MMOL/L (ref 3.4–5.3)
RBC # BLD AUTO: 2.63 10E12/L (ref 3.8–5.2)
SARS-COV-2 RNA RESP QL NAA+PROBE: NEGATIVE
SODIUM SERPL-SCNC: 138 MMOL/L (ref 133–144)
SPECIMEN SOURCE: NORMAL
WBC # BLD AUTO: 5.7 10E9/L (ref 4–11)

## 2021-06-22 PROCEDURE — 97535 SELF CARE MNGMENT TRAINING: CPT | Mod: GO

## 2021-06-22 PROCEDURE — 93005 ELECTROCARDIOGRAM TRACING: CPT

## 2021-06-22 PROCEDURE — 250N000013 HC RX MED GY IP 250 OP 250 PS 637: Performed by: SURGERY

## 2021-06-22 PROCEDURE — 250N000013 HC RX MED GY IP 250 OP 250 PS 637: Performed by: NURSE PRACTITIONER

## 2021-06-22 PROCEDURE — 97116 GAIT TRAINING THERAPY: CPT | Mod: GP | Performed by: REHABILITATION PRACTITIONER

## 2021-06-22 PROCEDURE — 250N000013 HC RX MED GY IP 250 OP 250 PS 637: Performed by: STUDENT IN AN ORGANIZED HEALTH CARE EDUCATION/TRAINING PROGRAM

## 2021-06-22 PROCEDURE — 36415 COLL VENOUS BLD VENIPUNCTURE: CPT | Performed by: SURGERY

## 2021-06-22 PROCEDURE — 214N000001 HC R&B CCU UMMC

## 2021-06-22 PROCEDURE — 80048 BASIC METABOLIC PNL TOTAL CA: CPT | Performed by: SURGERY

## 2021-06-22 PROCEDURE — 250N000011 HC RX IP 250 OP 636: Performed by: STUDENT IN AN ORGANIZED HEALTH CARE EDUCATION/TRAINING PROGRAM

## 2021-06-22 PROCEDURE — U0005 INFEC AGEN DETEC AMPLI PROBE: HCPCS | Performed by: NURSE PRACTITIONER

## 2021-06-22 PROCEDURE — U0003 INFECTIOUS AGENT DETECTION BY NUCLEIC ACID (DNA OR RNA); SEVERE ACUTE RESPIRATORY SYNDROME CORONAVIRUS 2 (SARS-COV-2) (CORONAVIRUS DISEASE [COVID-19]), AMPLIFIED PROBE TECHNIQUE, MAKING USE OF HIGH THROUGHPUT TECHNOLOGIES AS DESCRIBED BY CMS-2020-01-R: HCPCS | Performed by: NURSE PRACTITIONER

## 2021-06-22 PROCEDURE — 85610 PROTHROMBIN TIME: CPT | Performed by: SURGERY

## 2021-06-22 PROCEDURE — 71046 X-RAY EXAM CHEST 2 VIEWS: CPT | Mod: 26 | Performed by: RADIOLOGY

## 2021-06-22 PROCEDURE — 71046 X-RAY EXAM CHEST 2 VIEWS: CPT

## 2021-06-22 PROCEDURE — 83735 ASSAY OF MAGNESIUM: CPT | Performed by: SURGERY

## 2021-06-22 PROCEDURE — 93010 ELECTROCARDIOGRAM REPORT: CPT | Performed by: INTERNAL MEDICINE

## 2021-06-22 PROCEDURE — 97530 THERAPEUTIC ACTIVITIES: CPT | Mod: GP | Performed by: REHABILITATION PRACTITIONER

## 2021-06-22 PROCEDURE — 84100 ASSAY OF PHOSPHORUS: CPT | Performed by: SURGERY

## 2021-06-22 PROCEDURE — 82330 ASSAY OF CALCIUM: CPT | Performed by: SURGERY

## 2021-06-22 PROCEDURE — 85027 COMPLETE CBC AUTOMATED: CPT | Performed by: SURGERY

## 2021-06-22 PROCEDURE — 250N000011 HC RX IP 250 OP 636: Performed by: SURGERY

## 2021-06-22 RX ORDER — WARFARIN SODIUM 5 MG/1
5 TABLET ORAL
Status: COMPLETED | OUTPATIENT
Start: 2021-06-22 | End: 2021-06-22

## 2021-06-22 RX ORDER — FEXOFENADINE HCL 180 MG/1
180 TABLET ORAL DAILY
Status: DISCONTINUED | OUTPATIENT
Start: 2021-06-22 | End: 2021-06-26 | Stop reason: HOSPADM

## 2021-06-22 RX ORDER — IPRATROPIUM BROMIDE AND ALBUTEROL SULFATE 2.5; .5 MG/3ML; MG/3ML
3 SOLUTION RESPIRATORY (INHALATION) EVERY 4 HOURS PRN
Status: DISCONTINUED | OUTPATIENT
Start: 2021-06-22 | End: 2021-06-26 | Stop reason: HOSPADM

## 2021-06-22 RX ADMIN — QUETIAPINE 50 MG: 50 TABLET ORAL at 21:14

## 2021-06-22 RX ADMIN — FEXOFENADINE HYDROCHLORIDE 180 MG: 180 TABLET, FILM COATED ORAL at 08:46

## 2021-06-22 RX ADMIN — METOPROLOL TARTRATE 50 MG: 50 TABLET, FILM COATED ORAL at 21:14

## 2021-06-22 RX ADMIN — DIPHENHYDRAMINE HYDROCHLORIDE 12.5 MG: 50 INJECTION, SOLUTION INTRAMUSCULAR; INTRAVENOUS at 02:40

## 2021-06-22 RX ADMIN — ACETAMINOPHEN 650 MG: 325 TABLET, FILM COATED ORAL at 19:06

## 2021-06-22 RX ADMIN — HEPARIN SODIUM 5000 UNITS: 5000 INJECTION, SOLUTION INTRAVENOUS; SUBCUTANEOUS at 23:35

## 2021-06-22 RX ADMIN — Medication 10 MG: at 21:14

## 2021-06-22 RX ADMIN — HEPARIN SODIUM 5000 UNITS: 5000 INJECTION, SOLUTION INTRAVENOUS; SUBCUTANEOUS at 17:35

## 2021-06-22 RX ADMIN — Medication 1 TABLET: at 08:42

## 2021-06-22 RX ADMIN — WARFARIN SODIUM 5 MG: 5 TABLET ORAL at 17:36

## 2021-06-22 RX ADMIN — ASPIRIN 81 MG CHEWABLE TABLET 81 MG: 81 TABLET CHEWABLE at 08:42

## 2021-06-22 RX ADMIN — Medication 2.5 MG: at 21:21

## 2021-06-22 RX ADMIN — LOVASTATIN 40 MG: 20 TABLET ORAL at 21:14

## 2021-06-22 RX ADMIN — PAROXETINE HYDROCHLORIDE 10 MG: 10 TABLET, FILM COATED ORAL at 08:43

## 2021-06-22 RX ADMIN — METHOCARBAMOL 500 MG: 500 TABLET, FILM COATED ORAL at 19:07

## 2021-06-22 RX ADMIN — HEPARIN SODIUM 5000 UNITS: 5000 INJECTION, SOLUTION INTRAVENOUS; SUBCUTANEOUS at 08:43

## 2021-06-22 RX ADMIN — Medication 12.5 MG: at 21:14

## 2021-06-22 RX ADMIN — PANTOPRAZOLE SODIUM 40 MG: 40 TABLET, DELAYED RELEASE ORAL at 08:43

## 2021-06-22 RX ADMIN — HEPARIN SODIUM 5000 UNITS: 5000 INJECTION, SOLUTION INTRAVENOUS; SUBCUTANEOUS at 00:47

## 2021-06-22 RX ADMIN — METOPROLOL TARTRATE 50 MG: 50 TABLET, FILM COATED ORAL at 08:42

## 2021-06-22 ASSESSMENT — MIFFLIN-ST. JEOR: SCORE: 1063.25

## 2021-06-22 ASSESSMENT — ACTIVITIES OF DAILY LIVING (ADL)
ADLS_ACUITY_SCORE: 18
ADLS_ACUITY_SCORE: 19
ADLS_ACUITY_SCORE: 18

## 2021-06-22 NOTE — ANESTHESIA POSTPROCEDURE EVALUATION
Patient: Racquel Emmanuel    Procedure(s):  redo sternotomy, ascending aortic aneurysm  repair Size 23mm hemashield graft, Lysis of adhesions, On-pump oxygenation, Circulatory arrest, Trans espohageal echocardiogram    Diagnosis:Ascending aortic aneurysm (H) [I71.2]  Diagnosis Additional Information: No value filed.    Anesthesia Type:  General    Note:  Disposition: ICU            ICU Sign Out: Anesthesiologist/ICU physician sign out WAS performed   Postop Pain Control:    PONV:    Neuro/Psych:             Sign Out: PLANNED postop sedation   Airway/Respiratory:             Sign Out: AIRWAY IN SITU/Resp. Support               Airway in situ/Resp. Support: ETT   CV/Hemodynamics:             Sign Out: Acceptable CV status   Other NRE:    DID A NON-ROUTINE EVENT OCCUR?            Last vitals:  Vitals:    06/21/21 2007 06/21/21 2227 06/22/21 0555   BP: 131/67 127/66 134/61   Pulse: 91 94 81   Resp: 18 18    Temp: 36.7  C (98.1  F) 36.6  C (97.9  F)    SpO2: 95% 94%        Last vitals prior to Anesthesia Care Transfer:  CRNA VITALS  6/15/2021 1613 - 6/15/2021 1713      6/15/2021             Pulse:  65    ART BP:  110/60    ART Mean:  71    SpO2:  99 %          Electronically Signed By: Sammy Veliz MD  June 22, 2021  7:05 AM

## 2021-06-22 NOTE — PROGRESS NOTES
Cardiovascular Surgery Progress Note  06/22/2021           Assessment and Plan:   Racquel Emmanuel is a 86 year old female with a PMH of NICM 2/2 lymphoma tx, aortic insufficiency (s/p bioprosthetic AVR 6/3/2015), HLD, pAfib (on warfarin), HTN, CKD, MIGUELANGEL, GERD (h/o GI bleed 2019), subdural hematoma, lung CA (s/p lobectomy 2017), temporal arteritis, DLBCL (2014), 8cm ascending aortic aneurysm now s/p redo sternotomy ascending aortic aneurysm repair under [facility] with Dr. Smith on 6/15/2021.      Cardiovascular:   Ascending aortic aneurysm - s/p redo sternotomy and aortic aneuryrsm repair  Hx of bioprosthetic AVR in 2015  HFpEF  PFO   Most recent echo pre-op 3/1 showed LVEF 55-60%, mild to moderate TR, grade 2 diastolic dysfunction. ABEL post-op with stable moderate AI, along with PFO with left to right flow (not closed).   - ASA 81 mg, PTA statin, metoprolol   - Will restart PTA losartan today  - Chest tubes removed 6/21  Paroxysmal atrial fibrillation  - Restart PTA coumadin today, goal INR 2-3  - Dual Lead ICD set at DDD 50  - TPW removed 6/20  Chest Discomfort  - Noted left chest discomfort overnight which radiated to back and associated SOB. Resolved with oxycodone. Non-reproducible on physical exam.   - Will obtain EKG this morning to further evaluate - no acute findings, likely r/t removal of ES catheters/surgical pain.      Pulmonary:  H/O Lung SCC s/p RML lobectomy   MIGUELANGEL  Extubated POD 2; Saturating well on 2-3 lpm. Wean as tolerated. Only on home O2 at night  Encourage IS, C&DB, ambulating     Neuro/MSK:  Chronic back pain  H/O subdural hematoma (2020), no residual deficits  H/O of delirium  H/O PONV  Post-op pain  - PTA paroxetine, scheduled APAP q8h, Prn methocarbamol / oxycodone/ hydromorphone   - ES catheters removed 6/21  - Seroquel 50mg at bedtime  - Scheduled melatonin at bedtime  - Delirium precautions  Left arm swelling  Generalized edema to left arm and forearm that began immediatly post-op. No loss  of sensation or pain. Ecchymosis noted on left arm just distal to armpit. Keep elevated.      /Renal:  CKD (baseline Cr 1.6)  Acute kidney injury  Pre-op weight 128 lbs, Creatinine 1.33.   Diuresis - restarted on PTA torsemide 20 mg PO daily 6/19, received lasix 40 mg IV x1 6/19 PM and 6/20 afternoon.   - Transitioned to 2mg PO bumex 6/21. Net +525 yesterday, although several unmeasured urine occurences.      GI/FEN:   IBS  GERD  Hx GI bleed, resolved  Regular diet, SLP evaluated  - PPI daily   - continue bowel regimen, + BM  - Replace lytes as needed     Endo:  Stress induced hyperglycemia   Managed on insulin drip postop, transitioned to sliding scale goal BG <180. BG well controlled     Heme:   H/O B Cell lymphoma  Acute blood loss anemia and thrombocytopenia  - HIT negative for thrombocytopenia  - Hgb stable; Plt improving, possible hematoma noted on L arm. Will continue to monitor.      ID:  Stress induced leukocytosis   - Completed perioperative antibiotics  - WBC WNL, afebrile, no signs or symptoms of infection     HEENT:  Congestion, Seasonal Allergies  - Noted increased congestion and right ear plugged overnight. PTA on Nasacort and Allegra.   - Start PO fexofenadine daily     Prophylaxis:   Stress ulcer prophylaxis: Pantoprazole 40 mg daily  DVT prophylaxis: Subcutaneous heparin, PCD     Dispo:   Transferred to  on 6/19  Rehab recommending discharge to home with outpatient therapies     Staff surgeons have been informed of changes through both verbal and written communication.       Kristine Mendez PA-S  Cardiothoracic Surgery    I, Elizabeth Castanon , was present with the PA student who participated in the service and in the documentation of the note.  I have verified the history and personally performed the physical exam and medical decision making.  I agree with the assessment and plan of care as documented in the note.      Elizabeth Castanon, CLEMENT, ACNPC-AG, CCRN  Nurse Practitioner  Cardiothoracic  Surgery  Pager: [phone]  Date of Service (when I saw the patient): 06/23/21        Interval History:     States she did not sleep well last night. Notes left chest discomfort overnight which radiated to the back and associated SOB. Resolved with oxycodone.  Reports nausea without emesis yesterday following eating tomato soup and improvement with ondansetron. No longer having nausea.   Breathing improving however notes continued SPRAGUE but states this is her baseline.  Passing flatus, + BM. Denies palpitations, dizziness, syncopal symptoms, chills, myalgias or sternal popping/clicking.          Medications:       aspirin  81 mg Oral or Feeding Tube Daily     docusate sodium  100 mg Oral BID     fexofenadine  180 mg Oral Daily     heparin ANTICOAGULANT  5,000 Units Subcutaneous Q8H     losartan  12.5 mg Oral At Bedtime     lovastatin  40 mg Oral At Bedtime     melatonin  10 mg Oral or Feeding Tube QPM     metoprolol tartrate  50 mg Oral BID     multivitamin w/minerals  1 tablet Oral Daily     pantoprazole  40 mg Oral Daily     PARoxetine  10 mg Oral QAM     polyethylene glycol  17 g Oral Daily     QUEtiapine  50 mg Oral or Feeding Tube QPM     senna-docusate  1 tablet Oral BID     sodium chloride (PF)  3 mL Intracatheter Q8H     acetaminophen, sore throat lozenge, bisacodyl, dextrose, glucose **OR** dextrose **OR** glucagon, diphenhydrAMINE **OR** diphenhydrAMINE, hydrALAZINE, HYDROmorphone, ipratropium - albuterol 0.5 mg/2.5 mg/3 mL, lidocaine 4%, lidocaine (buffered or not buffered), magnesium hydroxide, methocarbamol, naloxone **OR** naloxone **OR** naloxone **OR** naloxone, ondansetron **OR** ondansetron, oxyCODONE **OR** oxyCODONE, prochlorperazine **OR** prochlorperazine, BETA BLOCKER NOT PRESCRIBED, sodium chloride (PF), sodium phosphate, Warfarin Therapy Reminder          Physical Exam:   Vitals were reviewed  Blood pressure 134/61, pulse 81, temperature 97.9  F (36.6  C), temperature source Oral, resp. rate  "18, height 1.575 m (5' 2\"), weight 67 kg (147 lb 11.3 oz), SpO2 94 %, not currently breastfeeding.  Vitals:    06/20/21 0700 06/21/21 0230 06/22/21 0555   Weight: 59.7 kg (131 lb 9.8 oz) 64.9 kg (143 lb 1.3 oz) 67 kg (147 lb 11.3 oz)      I/O last 3 completed shifts:  In: 865 [P.O.:865]  Out: 170 [Urine:170]    Gen: A&Ox4, NAD  CV: RRR, normal S1, S2, no murmurs, rubs or gallops   Pulm: Lungs diminished in bases, otherwise CTA, No wheezing or rhonchi  Abd: Soft, NT, ND, +BS  Ext: 1+ bilateral LE edema, 1+ edema to right arm with ecchymosis to LUE just distal to armpit   Neuro:  Nonfocal, moving all extremities spontaneously   Incision: c/d/i, no erythema, sternum stable         Data:    All labs and imaging reviewed by me.  Labs:    Data   BMP  Recent Labs   Lab 06/22/21  0614 06/21/21  0644 06/20/21  1635 06/20/21  0834    140 138 139   POTASSIUM 4.2 4.3 4.2 3.6   CHLORIDE 106 105 103 104   CORNELIO 7.8* 7.7* 8.2* 7.8*   CO2 31 31 28 29   BUN 26 29 32* 32*   CR 1.33* 1.42* 1.31* 1.43*   GLC 93 109* 116* 175*     CBC  Recent Labs   Lab 06/22/21  0614 06/21/21  0644 06/20/21  0834 06/19/21  0620   WBC 5.7 5.3 7.6 9.6   RBC 2.63* 2.60* 3.06* 2.95*   HGB 8.0* 8.1* 9.4* 9.1*   HCT 25.8* 25.0* 29.6* 28.3*   MCV 98 96 97 96   MCH 30.4 31.2 30.7 30.8   MCHC 31.0* 32.4 31.8 32.2   RDW 17.0* 17.0* 17.2* 17.9*   * 121* 133* 78*     INR  Recent Labs   Lab 06/15/21  2253 06/15/21  1710 06/15/21  1513   INR 1.26* 1.44* 1.86*      Hepatic Panel   Recent Labs   Lab 06/19/21  0620 06/18/21  0303 06/17/21  0441 06/16/21  0302   AST 24 16 22 52*   ALT 19 24 26 48   ALKPHOS 53 45 37* 35*   BILITOTAL 0.5 0.6 0.9 0.5   ALBUMIN 2.2* 2.1* 2.0* 2.5*     Glucose  Recent Labs   Lab 06/22/21  0614 06/21/21  0644 06/20/21  1635 06/20/21  0834 06/19/21  0620 06/18/21  2312 06/18/21  0732 06/18/21  0405 06/18/21  0350 06/18/21  0303 06/17/21  2342 06/17/21  2121   GLC 93 109* 116* 175* 105*  --   --   --   --  87  --   --    BGM  --   -- "   --   --   --  106* 111* 156* 62*  --  93 116*       Imaging:  Recent Results (from the past 24 hour(s))   US Upper Extremity Venous Duplex Left   Result Value    Radiologist flags Deep venous thrombosis (AA)    Narrative    EXAMINATION: DOPPLER VENOUS ULTRASOUND OF THE LEFT UPPER EXTREMITY,  6/21/2021 10:41 AM     COMPARISON: None.    HISTORY: Swelling, thrombocytopenia    TECHNIQUE:  Gray-scale evaluation with compression, spectral flow and  color Doppler assessment of the deep venous system of the left upper  extremity.    FINDINGS:  Left: Normal blood flow and waveforms are demonstrated in the  subclavian, and axillary veins. There is normal compressibility of the  brachial, basilic and cephalic veins.    There is nonocclusive deep venous thrombosis within the left internal  jugular vein.      Impression    IMPRESSION:  1.  Nonocclusive deep venous thrombosis within the left internal  jugular vein.    [Critical Result: Deep venous thrombosis]    Finding was identified on 6/21/2021 10:41 AM.     Elizabeth DOW-NP was contacted by Dr. Danilo Chanel at 6/21/2021 10:45  AM and verbalized understanding of the critical finding.     I have personally reviewed the examination and initial interpretation  and I agree with the findings.    ROBERT WRIGHT MD   XR Chest Port 1 View    Narrative    EXAM: XR CHEST PORT 1 VIEW  6/21/2021 12:26 PM     HISTORY:  s/p CT removal       COMPARISON:  6/20/2021    FINDINGS: Single view of the chest. Postsurgical changes of the chest.  Removal of left basilar chest tube. Left hemithorax cardiac device  leads are in similar position. Slightly improved right basilar  opacities. Stable retrocardiac opacities. Decreased small right and  stable moderate left pleural effusions. No pneumothorax. Cardiomegaly.  Aortic arch atherosclerosis.      Impression    IMPRESSION:   1. Stable postsurgical changes. Removal of left basilar chest tube.  2. Improved right basilar and unchanged retrocardiac  opacities.  3. Decreased small right and unchanged moderate left pleural effusion.  4. Unchanged appearance of cardiomegaly.    I have personally reviewed the examination and initial interpretation  and I agree with the findings.    JOSE VAUGHAN MD

## 2021-06-22 NOTE — PLAN OF CARE
Pt VSS this shift, SR - sinus tachy on telemetry, denies pain most of shift. PRN robaxin and tylenol given this PM for incisional pain. On 2-3L O2 nasal cannula, tachypnea and dyspnea with exertion. Desats into 80s on RA while ambulating. CT d/c'd 6/21. Urinating without issues, had loose BM x2 today, appetite is good. Up with SBA and walker, makes needs known. Pt's vision is blurry at baseline, difficulty reading. LUE swollen d/t nonocclusive clot found on US 6/21. Warfarin restarted today. Pt has not slept well the past nights but reports this is her baseline at home. Will continue to monitor.   Plan to discharge home with home health and daughter when medically stable.       Problem: Sleep Disturbance (Delirium)  Goal: Improved Sleep  Outcome: No Change     Problem: Activity Intolerance (Cardiovascular Surgery)  Goal: Improved Activity Tolerance  Outcome: Improving     Problem: Attention and Thought Clarity Impairment (Delirium)  Goal: Improved Attention and Thought Clarity  Outcome: Adequate for Discharge

## 2021-06-22 NOTE — PHARMACY-ANTICOAGULATION SERVICE
Clinical Pharmacy - Warfarin Dosing Consult     Pharmacy has been consulted to manage this patient s warfarin therapy.  Indication: Atrial Fibrillation  Therapy Goal: INR 2-3  Provider/Team: Elizabeth Castanon CNP  Warfarin Prior to Admission: Yes  Warfarin PTA Regimen: 2.5 mg on Wednesdays, 5 mg all other days of the week  Significant drug interactions: Aspirin, heparin subQ, lovastatin, melatonin, pantoprazole, paroxetine, quetiapine  Recent documented change in oral intake/nutrition: Unknown  Dose comments: Patient reports warfarin has been on hold prior to admission since 5/28/21 (bridging with lovenox) due to angio on 6/3/21 and surgery on 6/15/21    INR   Date Value Ref Range Status   06/22/2021 1.04 0.86 - 1.14 Final   06/15/2021 1.26 (H) 0.86 - 1.14 Final       Recommend warfarin 5 mg today.  Pharmacy will monitor Racquel Emmanuel daily and order warfarin doses to achieve specified goal.      Please contact pharmacy as soon as possible if the warfarin needs to be held for a procedure or if the warfarin goals change.      Inocencia Feldman, Pharm.D., BCPS

## 2021-06-23 ENCOUNTER — APPOINTMENT (OUTPATIENT)
Dept: PHYSICAL THERAPY | Facility: CLINIC | Age: 86
DRG: 220 | End: 2021-06-23
Attending: SURGERY
Payer: MEDICARE

## 2021-06-23 ENCOUNTER — APPOINTMENT (OUTPATIENT)
Dept: OCCUPATIONAL THERAPY | Facility: CLINIC | Age: 86
DRG: 220 | End: 2021-06-23
Attending: SURGERY
Payer: MEDICARE

## 2021-06-23 ENCOUNTER — APPOINTMENT (OUTPATIENT)
Dept: CARDIOLOGY | Facility: CLINIC | Age: 86
DRG: 220 | End: 2021-06-23
Attending: NURSE PRACTITIONER
Payer: MEDICARE

## 2021-06-23 LAB
ANION GAP SERPL CALCULATED.3IONS-SCNC: 3 MMOL/L (ref 3–14)
BUN SERPL-MCNC: 23 MG/DL (ref 7–30)
CALCIUM SERPL-MCNC: 8.4 MG/DL (ref 8.5–10.1)
CHLORIDE SERPL-SCNC: 105 MMOL/L (ref 94–109)
CO2 SERPL-SCNC: 30 MMOL/L (ref 20–32)
CREAT SERPL-MCNC: 1.34 MG/DL (ref 0.52–1.04)
ERYTHROCYTE [DISTWIDTH] IN BLOOD BY AUTOMATED COUNT: 17 % (ref 10–15)
GFR SERPL CREATININE-BSD FRML MDRD: 36 ML/MIN/{1.73_M2}
GLUCOSE SERPL-MCNC: 106 MG/DL (ref 70–99)
HCT VFR BLD AUTO: 27.2 % (ref 35–47)
HGB BLD-MCNC: 8.7 G/DL (ref 11.7–15.7)
INR PPP: 1 (ref 0.86–1.14)
INTERPRETATION ECG - MUSE: NORMAL
MAGNESIUM SERPL-MCNC: 2.3 MG/DL (ref 1.6–2.3)
MCH RBC QN AUTO: 30.9 PG (ref 26.5–33)
MCHC RBC AUTO-ENTMCNC: 32 G/DL (ref 31.5–36.5)
MCV RBC AUTO: 97 FL (ref 78–100)
PHOSPHATE SERPL-MCNC: 3.7 MG/DL (ref 2.5–4.5)
PLATELET # BLD AUTO: 189 10E9/L (ref 150–450)
POTASSIUM SERPL-SCNC: 4.1 MMOL/L (ref 3.4–5.3)
RBC # BLD AUTO: 2.82 10E12/L (ref 3.8–5.2)
SODIUM SERPL-SCNC: 138 MMOL/L (ref 133–144)
WBC # BLD AUTO: 7.4 10E9/L (ref 4–11)

## 2021-06-23 PROCEDURE — 214N000001 HC R&B CCU UMMC

## 2021-06-23 PROCEDURE — 250N000011 HC RX IP 250 OP 636: Performed by: STUDENT IN AN ORGANIZED HEALTH CARE EDUCATION/TRAINING PROGRAM

## 2021-06-23 PROCEDURE — 93306 TTE W/DOPPLER COMPLETE: CPT

## 2021-06-23 PROCEDURE — 80048 BASIC METABOLIC PNL TOTAL CA: CPT | Performed by: SURGERY

## 2021-06-23 PROCEDURE — 97110 THERAPEUTIC EXERCISES: CPT | Mod: GP | Performed by: REHABILITATION PRACTITIONER

## 2021-06-23 PROCEDURE — 93306 TTE W/DOPPLER COMPLETE: CPT | Mod: 26 | Performed by: INTERNAL MEDICINE

## 2021-06-23 PROCEDURE — 250N000013 HC RX MED GY IP 250 OP 250 PS 637: Performed by: SURGERY

## 2021-06-23 PROCEDURE — 97530 THERAPEUTIC ACTIVITIES: CPT | Mod: GO | Performed by: OCCUPATIONAL THERAPIST

## 2021-06-23 PROCEDURE — 36415 COLL VENOUS BLD VENIPUNCTURE: CPT | Performed by: SURGERY

## 2021-06-23 PROCEDURE — 250N000013 HC RX MED GY IP 250 OP 250 PS 637: Performed by: STUDENT IN AN ORGANIZED HEALTH CARE EDUCATION/TRAINING PROGRAM

## 2021-06-23 PROCEDURE — 250N000013 HC RX MED GY IP 250 OP 250 PS 637: Performed by: NURSE PRACTITIONER

## 2021-06-23 PROCEDURE — 85027 COMPLETE CBC AUTOMATED: CPT | Performed by: SURGERY

## 2021-06-23 PROCEDURE — 84100 ASSAY OF PHOSPHORUS: CPT | Performed by: SURGERY

## 2021-06-23 PROCEDURE — 250N000011 HC RX IP 250 OP 636: Performed by: NURSE PRACTITIONER

## 2021-06-23 PROCEDURE — 97116 GAIT TRAINING THERAPY: CPT | Mod: GP | Performed by: REHABILITATION PRACTITIONER

## 2021-06-23 PROCEDURE — 85610 PROTHROMBIN TIME: CPT | Performed by: SURGERY

## 2021-06-23 PROCEDURE — 97110 THERAPEUTIC EXERCISES: CPT | Mod: GO | Performed by: OCCUPATIONAL THERAPIST

## 2021-06-23 PROCEDURE — 83735 ASSAY OF MAGNESIUM: CPT | Performed by: SURGERY

## 2021-06-23 RX ORDER — TORSEMIDE 20 MG/1
20 TABLET ORAL DAILY
Status: DISCONTINUED | OUTPATIENT
Start: 2021-06-23 | End: 2021-06-26 | Stop reason: HOSPADM

## 2021-06-23 RX ORDER — WARFARIN SODIUM 5 MG/1
5 TABLET ORAL
Status: COMPLETED | OUTPATIENT
Start: 2021-06-23 | End: 2021-06-23

## 2021-06-23 RX ADMIN — METOPROLOL TARTRATE 50 MG: 50 TABLET, FILM COATED ORAL at 20:03

## 2021-06-23 RX ADMIN — DOCUSATE SODIUM 50 MG AND SENNOSIDES 8.6 MG 1 TABLET: 8.6; 5 TABLET, FILM COATED ORAL at 08:05

## 2021-06-23 RX ADMIN — ACETAMINOPHEN 650 MG: 325 TABLET, FILM COATED ORAL at 08:02

## 2021-06-23 RX ADMIN — HEPARIN SODIUM 5000 UNITS: 5000 INJECTION, SOLUTION INTRAVENOUS; SUBCUTANEOUS at 16:30

## 2021-06-23 RX ADMIN — FEXOFENADINE HYDROCHLORIDE 180 MG: 180 TABLET, FILM COATED ORAL at 08:04

## 2021-06-23 RX ADMIN — HYDRALAZINE HYDROCHLORIDE 10 MG: 20 INJECTION INTRAMUSCULAR; INTRAVENOUS at 18:02

## 2021-06-23 RX ADMIN — Medication 10 MG: at 20:03

## 2021-06-23 RX ADMIN — ACETAMINOPHEN 650 MG: 325 TABLET, FILM COATED ORAL at 01:48

## 2021-06-23 RX ADMIN — HEPARIN SODIUM 5000 UNITS: 5000 INJECTION, SOLUTION INTRAVENOUS; SUBCUTANEOUS at 08:04

## 2021-06-23 RX ADMIN — WARFARIN SODIUM 5 MG: 5 TABLET ORAL at 17:43

## 2021-06-23 RX ADMIN — Medication 12.5 MG: at 20:07

## 2021-06-23 RX ADMIN — PAROXETINE HYDROCHLORIDE 10 MG: 10 TABLET, FILM COATED ORAL at 08:06

## 2021-06-23 RX ADMIN — TORSEMIDE 20 MG: 20 TABLET ORAL at 09:45

## 2021-06-23 RX ADMIN — DOCUSATE SODIUM 100 MG: 100 CAPSULE, LIQUID FILLED ORAL at 08:05

## 2021-06-23 RX ADMIN — ACETAMINOPHEN 650 MG: 325 TABLET, FILM COATED ORAL at 20:15

## 2021-06-23 RX ADMIN — METOPROLOL TARTRATE 50 MG: 50 TABLET, FILM COATED ORAL at 08:04

## 2021-06-23 RX ADMIN — HEPARIN SODIUM 5000 UNITS: 5000 INJECTION, SOLUTION INTRAVENOUS; SUBCUTANEOUS at 23:39

## 2021-06-23 RX ADMIN — ASPIRIN 81 MG CHEWABLE TABLET 81 MG: 81 TABLET CHEWABLE at 08:04

## 2021-06-23 RX ADMIN — LOVASTATIN 40 MG: 20 TABLET ORAL at 20:08

## 2021-06-23 RX ADMIN — PANTOPRAZOLE SODIUM 40 MG: 40 TABLET, DELAYED RELEASE ORAL at 08:03

## 2021-06-23 RX ADMIN — Medication 1 TABLET: at 08:04

## 2021-06-23 ASSESSMENT — MIFFLIN-ST. JEOR: SCORE: 1035.54

## 2021-06-23 ASSESSMENT — ACTIVITIES OF DAILY LIVING (ADL)
ADLS_ACUITY_SCORE: 18

## 2021-06-23 NOTE — PLAN OF CARE
Temp: 97.8  F (36.6  C) Temp src: Oral BP: 133/75 Pulse: 77   Resp: 20 SpO2: 98 % O2 Device: Nasal cannula Oxygen Delivery: 3 LPM    D: S/p ascending aortic aneurysm repair 6/15/21 c/b PFO. Hx lymphoma, NICM 2/2 lymphoma tx, AVR 2015, HLD, afib, HTN, CKD, MIGUELANGEL, GERD, GIB, subdural hematoma, lung CA s/p lobectomy 2017, temporal arteritis    I/A: Monitored vitals and assessed pt status. A&O x4. Craig. Blurry vision at baseline. VSS see flowsheet. SR/ST. 2-3L, tachypneic and SPRAGUE. Reports incisional pain, prn tylenol and oxycodone. Reports worsening LUE swelling 2/2 nonocclusive DVT, CMS intact, crosscover assessed, elevated. Voiding adequate amount. Up SBA w/ walker. Slept off and on overnight.    P: Continue to monitor and follow POC. Notify CVTS with changes.

## 2021-06-23 NOTE — PROGRESS NOTES
CLINICAL NUTRITION SERVICES - REASSESSMENT NOTE     Nutrition Prescription    RECOMMENDATIONS FOR MDs/PROVIDERS TO ORDER:  None at this time.    Malnutrition Status:    Patient does not meet two of the established criteria necessary for diagnosing malnutrition     Recommendations already ordered by Registered Dietitian (RD):  Oral supplements    Future/Additional Recommendations:  1. Continue current diet, as ordered. Rec liberalize diet if inadequate nutrition intake.   2. Continue multivitamin with minerals to help ensure micronutrient needs are met.   3. Consider scheduling antiemetics/antinauseants ~20 minutes prior to meals to help optimize nausea control.        EVALUATION OF THE PROGRESS TOWARD GOALS   Diet: Low Saturated Fat/2400 mg Sodium since 6/18. Previously on clear liquids 6/17. SLP signed off 6/21.   Intake: Tolerating diet. Per nursing flowsheets, pt consuming 25% with a fair appetite 6/18, % with a good appetite 6/19, 100% with a good appetite 6/20, % with a good appetite 6/21, and 100% on 6/22. Per RN on 6/22, good appetite. [employer] (meal ordering system) indicates food/beverages sent 6/20-6/22 totaled 1182 kcals and 38 g protein daily on average. This does not meet estimated needs noted below. Diet order, room service (waited over two hours for breakfast today, 6/23), and post-op status are factors affecting oral intake. Intermittent nausea resolved per chart.      Previous Nutrition Support orders: Osmolite 1.5 with goal rate of 45ml/hr (1080ml/day) + Prosource (1 pkt TID) provides 1740 kcals (30 kcal/kg), 100 g PRO (1.7 g/kg), 822 ml free H20, 219 g CHO, and 0 g fiber daily. No TF intake documented on 6/17, OGT removed 6/17 pm.     NEW FINDINGS   Resp: Extubated on 6/17.   GI: Last stool on 6/22, Loose/watery and brown stool noted. Having zero to two stools per day. On scheduled miralax and senna, often held. Has orders for prn zofran.  Nutrition hx: Per discussion with pt  (6/23), she and her  had COVID-19 in 7/2020. States she had altered taste, but this improved. Estimated she has been eating less over the last three months due to lack of appetite, fatigue, and not feeling well. Pt drinks Boost intermittently and generally dislikes Ensure. PTA, was living with her  at home. They jointly did the grocery shopping and meal preparation. At times, her daughter assists.    Wt Hx: 59 kg (10/15/19), 61 kg (5/12/20), 59.3 kg (4/5/21), 59 kg (5/10/21), 59 kg (6/3/21), 58.1 kg (6/15/21, admit), 64.2 kg (6/23/21) - No wt loss noted this admission. Pt is on lasix.     ASSESSED NUTRITION NEEDS (updated)  Dosing Weight: 58 kg (actual, based on lowest wt this admit of 58.1 kg on 6/15)  Estimated Energy Needs: 9812-9595 kcals/day (25 - 30 kcals/kg)  Justification: Maintenance  Estimated Protein Needs: 70-87 grams protein/day (1.2-1.5 grams of pro/kg)  Justification: Increased needs post-op  Estimated Fluid Needs: (1 mL/kcal)   Justification: Maintenance    MALNUTRITION  % Intake: Decreased intake does not meet criteria  % Weight Loss: Not meeting this criteria.     Subcutaneous Fat Loss: None observed  Muscle Loss: Does not meet criteria, muscle loss appears consistent with age.  Fluid Accumulation/Edema: Not meeting this criteria.     Malnutrition Diagnosis: Patient does not meet two of the established criteria necessary for diagnosing malnutrition     Previous Goals   Total avg nutritional intake to meet a minimum of 25 kcal/kg and 1.3 g PRO/kg daily (per dosing wt 58 kg).  Evaluation: Not meeting.     Previous Nutrition Diagnosis  Inadequate protein-energy intake related to NPO (intubated) as evidenced by currently meeting 0% protein-energy needs.    Evaluation: Improving. Changed to new nutrition dx below.     CURRENT NUTRITION DIAGNOSIS  Inadequate oral intake related to diet order, room service (waited over two hours for breakfast today), and post-op status as evidenced by  food/beverages sent 6/20-6/22 totaled 1182 kcals and 38 g protein daily on average while estimated needs are 7607-9130 kcals/day (25 - 30 kcals/kg) and 70-87 grams protein/day (1.2-1.5 grams of pro/kg).    INTERVENTIONS  Implementation  Nutrition education for nutrition relationship to health/disease: Importance of adequate nutrition intake discussed, including adequate protein/kcals. Encouraged intake of oral supplements (see below).   Medical food supplement therapy: Discussed oral supplements with pt. Ordered chocolate [employer] at 10:00 chocolate Magic Cup at 14:00. Placed a one-time order for Ensure Enlive at [facility] (6/23). Pt shared her daughter will likely purchase oral supplements for her to use once discharged.     Goals  Patient to consume % of nutritionally adequate meal trays TID, or the equivalent with supplements/snacks.    Monitoring/Evaluation  Progress toward goals will be monitored and evaluated per protocol.     Nutrition will continue to follow.      Divya Pena, MS, RD, LD, [facility]   6C Pgr: [phone]

## 2021-06-23 NOTE — PROGRESS NOTES
Care Management Follow Up    Length of Stay (days): 8    Expected Discharge Date: 06/25/21     Concerns to be Addressed: care coordination Per prev CC note--if pt goes home prefers [facility] or [facility]  for RN/PT/OT/[facility]  Patient plan of care discussed at interdisciplinary rounds: Yes    Anticipated Discharge Disposition: (Home care vs TCU)  Home with HHPT/OT recs today     Anticipated Discharge Services:  HH for RN/PT/OT  Anticipated Discharge DME: Oxygen: had pta  Family to bring portable  Patient/family educated on Medicare website which has current facility and service quality ratings:    Education Provided on the Discharge Plan:    Patient/Family in Agreement with the Plan: yes--prev CC says daughter planning to stay with pt.    Referrals Placed by CM/SW:  I called [facility] and they have rN/PT/OT available--no HHA  I have fax'd facesheet and note from today.  Private pay costs discussed: Not applicable    Additional Information:  [facility] for [facility] to look at records and accept pt--they can begin on the weekend____.  Equipment: pt has shower grab bars  May need to go home with a 4 WW.        Taisha Mckee, RN

## 2021-06-23 NOTE — PROGRESS NOTES
Cardiovascular Surgery Progress Note  06/23/2021           Assessment and Plan:   Racquel Emmanuel is a 86 year old female with a PMH of NICM 2/2 lymphoma tx, aortic insufficiency (s/p bioprosthetic AVR 6/3/2015), HLD, pAfib (on warfarin), HTN, CKD, MIGUELANGEL, GERD (h/o GI bleed 2019), subdural hematoma, lung CA (s/p lobectomy 2017), temporal arteritis, DLBCL (2014), 8cm ascending aortic aneurysm now s/p redo sternotomy ascending aortic aneurysm repair under [facility] with Dr. Smith on 6/15/2021.      Cardiovascular:   Ascending aortic aneurysm - s/p redo sternotomy and aortic aneuryrsm repair  Hx of bioprosthetic AVR in 2015  HFpEF  PFO   Most recent echo pre-op 3/1 showed LVEF 55-60%, mild to moderate TR, grade 2 diastolic dysfunction. ABEL post-op with stable moderate AI, along with PFO with left to right flow (not closed).   - ASA 81 mg, PTA statin, metoprolol, PTA losartan  - Restart PTA torsemide 20 mg  - Chest tubes removed 6/21  - Post-op Echo ordered  Paroxysmal atrial fibrillation  - Restarted PTA coumadin 6/23, goal INR 2-3.   - Dual Lead ICD set at DDD 50  - TPW removed 6/20  Chest Discomfort  - Noted left chest discomfort overnight 6/21 which radiated to back and associated SOB. Resolved with oxycodone. Non-reproducible on physical exam.  EKG with no acute findings, likely r/t removal of ES catheters/surgical pain.      Pulmonary:  H/O Lung SCC s/p RML lobectomy   MIGUELANGEL  Extubated POD 2; Saturating well on 2-3 lpm. Wean as tolerated. Only on home O2 at night  Encourage IS, C&DB, ambulating     Neuro/MSK:  Chronic back pain  H/O subdural hematoma (2020), no residual deficits  H/O of delirium  H/O PONV  Post-op pain  - PTA paroxetine, scheduled APAP q8h, Prn methocarbamol / oxycodone/ hydromorphone   - ES catheters removed 6/21  - Discontinued Seroquel 50mg at bedtime 6/23  - Scheduled melatonin at bedtime  - Delirium precautions  Left arm swelling  Generalized edema to left arm and forearm that began immediatly  post-op. No loss of sensation or pain. Ecchymosis noted on left arm just distal to armpit. Keep elevated.      /Renal:  CKD (baseline Cr 1.6)  Acute kidney injury  Pre-op weight 128 lbs, Creatinine stable at 1.34.   Diuresis - restarted on PTA torsemide 20 mg PO daily 6/19, received lasix 40 mg IV x1 6/19 PM and 6/20 afternoon. 2mg PO bumex 6/21.   - Restart PTA torsemide 20 mg. Net +210 yesterday, although only 150 output noted with 3 unmeasured urine occurances. Currently 6 kg above pre-op weight.      GI/FEN:   IBS  GERD  Hx GI bleed, resolved  Regular diet, SLP evaluated  - PPI daily   - continue bowel regimen, + BM  - Replace lytes as needed     Endo:  Stress induced hyperglycemia   Managed on insulin drip postop, transitioned to sliding scale goal BG <180. BG well controlled     Heme:   H/O B Cell lymphoma  Acute blood loss anemia and thrombocytopenia  - HIT negative for thrombocytopenia  - Hgb stable; Plt improving, possible hematoma noted on L arm. Will continue to monitor.      ID:  Stress induced leukocytosis   - Completed perioperative antibiotics  - WBC WNL, afebrile, no signs or symptoms of infection     HEENT:  Congestion, Seasonal Allergies  - Noted increased congestion and right ear plugged 6/22. PTA on Nasacort and Allegra.   - Continue PO fexofenadine daily     Prophylaxis:   Stress ulcer prophylaxis: Pantoprazole 40 mg daily  DVT prophylaxis: Subcutaneous heparin, PCD     Dispo:   Transferred to  on 6/19  Rehab recommending discharge to home with outpatient therapies   Discharge as early as Thurs/Friday pending INR trends    Staff surgeons have been informed of changes through both verbal and written communication.       Kristine Mendez PA-S  Cardiothoracic Surgery     I, Elizabeth Castanon , was present with the PA student who participated in the service and in the documentation of the note.  I have verified the history and personally performed the physical exam and medical decision making.  I  agree with the assessment and plan of care as documented in the note.      CLEMENT Wilkins, ACN-AG, CCRN  Nurse Practitioner  Cardiothoracic Surgery  Pager: [phone]  Date of Service (when I saw the patient): 06/23/21        Interval History:     No acute events overnight. Slept marginally better than previous nights. Notes she is hoping to go home soon as Sunday is her 67th wedding anniversary.   Appetite still diminished, although nausea resolved.   Breathing continues to improve with minimal SPRAGUE, which is baseline.   Passing flatus, + BM. Denies palpitations, dizziness, syncopal symptoms, chills, myalgias or sternal popping/clicking.          Medications:       aspirin  81 mg Oral or Feeding Tube Daily     docusate sodium  100 mg Oral BID     fexofenadine  180 mg Oral Daily     heparin ANTICOAGULANT  5,000 Units Subcutaneous Q8H     losartan  12.5 mg Oral At Bedtime     lovastatin  40 mg Oral At Bedtime     melatonin  10 mg Oral or Feeding Tube QPM     metoprolol tartrate  50 mg Oral BID     multivitamin w/minerals  1 tablet Oral Daily     pantoprazole  40 mg Oral Daily     PARoxetine  10 mg Oral QAM     polyethylene glycol  17 g Oral Daily     QUEtiapine  50 mg Oral or Feeding Tube QPM     senna-docusate  1 tablet Oral BID     sodium chloride (PF)  3 mL Intracatheter Q8H     acetaminophen, sore throat lozenge, bisacodyl, dextrose, glucose **OR** dextrose **OR** glucagon, diphenhydrAMINE **OR** diphenhydrAMINE, hydrALAZINE, HYDROmorphone, ipratropium - albuterol 0.5 mg/2.5 mg/3 mL, lidocaine 4%, lidocaine (buffered or not buffered), magnesium hydroxide, methocarbamol, naloxone **OR** naloxone **OR** naloxone **OR** naloxone, ondansetron **OR** ondansetron, oxyCODONE **OR** oxyCODONE, prochlorperazine **OR** prochlorperazine, BETA BLOCKER NOT PRESCRIBED, sodium chloride (PF), sodium phosphate, Warfarin Therapy Reminder          Physical Exam:   Vitals were reviewed  Blood pressure 133/75, pulse 77,  "temperature 97.8  F (36.6  C), temperature source Oral, resp. rate 20, height 1.575 m (5' 2\"), weight 64.2 kg (141 lb 9.6 oz), SpO2 98 %, not currently breastfeeding.  Vitals:    06/21/21 0230 06/22/21 0555 06/23/21 0000   Weight: 64.9 kg (143 lb 1.3 oz) 67 kg (147 lb 11.3 oz) 64.2 kg (141 lb 9.6 oz)      I/O last 3 completed shifts:  In: 360 [P.O.:360]  Out: 450 [Urine:450]    Gen: A&Ox4, NAD  CV: RRR, normal S1, S2, no murmurs, rubs or gallops   Pulm: Lungs diminished in bases, otherwise CTA, No wheezing or rhonchi  Abd: Soft, NT, ND, +BS  Ext: 1+ bilateral LE edema, 1+ edema to right arm with ecchymosis to LUE just distal to armpit   Neuro:  Nonfocal, moving all extremities spontaneously   Incision: c/d/i, no erythema, sternum stable         Data:    All labs and imaging reviewed by me.  Labs:    Data   BMP  Recent Labs   Lab 06/23/21  0618 06/22/21  0614 06/21/21  0644 06/20/21  1635    138 140 138   POTASSIUM 4.1 4.2 4.3 4.2   CHLORIDE 105 106 105 103   CORNELIO PENDING 7.8* 7.7* 8.2*   CO2 PENDING 31 31 28   BUN PENDING 26 29 32*   CR PENDING 1.33* 1.42* 1.31*   GLC PENDING 93 109* 116*     CBC  Recent Labs   Lab 06/23/21  0618 06/22/21  0614 06/21/21  0644 06/20/21  0834   WBC 7.4 5.7 5.3 7.6   RBC 2.82* 2.63* 2.60* 3.06*   HGB 8.7* 8.0* 8.1* 9.4*   HCT 27.2* 25.8* 25.0* 29.6*   MCV 97 98 96 97   MCH 30.9 30.4 31.2 30.7   MCHC 32.0 31.0* 32.4 31.8   RDW 17.0* 17.0* 17.0* 17.2*    133* 121* 133*     INR  Recent Labs   Lab 06/23/21  0618 06/22/21  0846   INR 1.00 1.04      Hepatic Panel   Recent Labs   Lab 06/19/21  0620 06/18/21  0303 06/17/21  0441   AST 24 16 22   ALT 19 24 26   ALKPHOS 53 45 37*   BILITOTAL 0.5 0.6 0.9   ALBUMIN 2.2* 2.1* 2.0*     Glucose  Recent Labs   Lab 06/23/21  0618 06/22/21  0614 06/21/21  0644 06/20/21  1635 06/20/21  0834 06/19/21  0620 06/18/21  2312 06/18/21  0732 06/18/21  0405 06/18/21  0350 06/17/21  2342 06/17/21  2342 06/17/21  2121   GLC PENDING 93 109* 116* 175* " 105*  --   --   --   --    < >  --   --    BGM  --   --   --   --   --   --  106* 111* 156* 62*  --  93 116*    < > = values in this interval not displayed.       Imaging:  Recent Results (from the past 24 hour(s))   XR Chest 2 Views    Narrative    EXAM: XR CHEST 2 VW  6/22/2021 12:52 PM      HISTORY: s/p CT removal    COMPARISON: X-ray 6/21/2021    FINDINGS: Two views of the chest. Postoperative changes of the chest.  Left chest wall cardiac device with leads overlying the right atrium  and right ventricle. No pneumothorax. Small bilateral pleural  effusions. Unchanged cardiac silhouette. Retrocardiac and left basilar  opacities. Visualized upper abdomen is unremarkable. No aggressive  osseous lesions. Atherosclerotic calcifications of the aortic arch and  descending aorta. Abdominal aortic calcifications.      Impression    IMPRESSION:  1. Small bilateral pleural effusions.  2. Left basilar and retrocardiac opacities, slightly improved from  prior.  3. Aortic atherosclerosis.    I have personally reviewed the examination and initial interpretation  and I agree with the findings.    JOSE VAUGHAN MD

## 2021-06-23 NOTE — DISCHARGE SUMMARY
"      Discharge Summary    Racquel Emmanuel MRN# [id number]   YOB: 1935 Age: 86 year old     Date of Admission:  6/15/2021  Date of Discharge:  6/26/21  Admitting Physician:  Gokul Smith MD  Discharge Physician:  Gokul Smith MD  Discharging Service:  Cardiothoracic Surgery  Preoperative Weight:  128 lbs  Discharge Weight:  136 lbs     Primary Provider: Nina Orellana          Admission Diagnoses:   Ascending aortic aneurysm (H) [I71.2]  Aortic regurgitation, status post aortic valve replacement  Chronic renal insufficiency  Hx tissue AVR (2015)   Coumadin for paroxysmal A-fib  HLD  GI bleed  Osteopenia  Hx ICD/PPM placement   Hx lobectomy of right middle lung  MIGUELANGEL  HTN   IBS  GERD         Discharge Diagnosis:   S/P ascending aortic aneurysm repair   Post-op Atrial Fibrillation   Needs INR check 6/28/21 for atrial fibrillation, goal INR 2-3  Recurrent left pleural effusion, resolved            Discharge Disposition:     Discharged to home with home care           Condition on Discharge:     Discharge condition: Stable   Discharge vitals: Blood pressure 134/76, pulse 79, temperature 98.1  F (36.7  C), temperature source Oral, resp. rate 18, height 1.575 m (5' 2\"), weight 61.9 kg (136 lb 8 oz), SpO2 92 %, not currently breastfeeding.     Code status on discharge: Full Code           Procedures:   Procedure Date 06/15/2021  Surgeon: Dr. Gokul Smith  1.  Redo median sternotomy  2.  Repair of ascending aortic aneurysm with 32 mm Hemashield platinum graft under deep hypothermic circulatory arrest.  3.  Peripheral cardiopulmonary bypass via the right femoral artery and vein cannulation.          Medications Prior to Admission:     Medications Prior to Admission   Medication Sig Dispense Refill Last Dose     acetaminophen (TYLENOL) 500 MG tablet Take 500-1,000 mg by mouth every 8 hours as needed for mild pain   Past Week at Unknown time     calcitRIOL (ROCALTROL) 0.25 MCG capsule Take 0.25 mcg by mouth " daily   6/14/2021 at 1800     calcium carbonate (TUMS) 500 MG chewable tablet Take 1 chew tab by mouth every 4 hours as needed for heartburn   Past Month at Unknown time     digoxin (LANOXIN) 125 MCG tablet Take 125 mcg by mouth three times a week Take on Mon,  Wed, Fri 6/14/2021 at 2100     enoxaparin ANTICOAGULANT (LOVENOX) 80 MG/0.8ML syringe Last dose 6/13/21 PM   6/13/20212100 at Unknown time     ferrous sulfate (FEROSUL) 325 (65 Fe) MG tablet Take 1 tablet by mouth twice daily on three days a week (Mon Wed and Fri only)   6/14/2021 at 0800     fexofenadine (ALLEGRA) 180 MG tablet Take 180 mg by mouth daily   6/15/2021 at 0430     losartan (COZAAR) 25 MG tablet Take 12.5 mg by mouth At Bedtime    6/14/2021 at 2100     lovastatin (MEVACOR) 40 MG tablet Take 40 mg by mouth At Bedtime    6/14/2021 at 2100     metoprolol succinate ER (TOPROL-XL) 25 MG 24 hr tablet Take 25 mg by mouth daily   6/15/2021 at 0430     omeprazole (PRILOSEC) 40 MG DR capsule Take 40 mg by mouth daily   6/14/2021 at 0800     PARoxetine (PAXIL) 10 MG tablet Take 10 mg by mouth every morning    6/14/2021 at 0800     potassium chloride ER (K-TAB/KLOR-CON) 10 MEQ CR tablet Take 2 tablets by mouth daily (with dinner)    6/14/2021 at 2100     torsemide (DEMADEX) 20 MG tablet Take 20 mg by mouth daily   6/14/2021 at 0800     triamcinolone (NASACORT) 55 MCG/ACT nasal aerosol Spray 2 sprays into both nostrils daily as needed   Past Month at Unknown time     warfarin ANTICOAGULANT (COUMADIN) 2.5 MG tablet Take 2.5 mg on Wednesday and Take 5 mg on all other days of the week (or as directed). Takes in the evening.   5/28/2021 at Unknown time     multivitamin (OCUVITE) TABS tablet Take 1 tablet by mouth 2 times daily   More than a month at Unknown time             Discharge Medications:        Review of your medicines      UNREVIEWED medicines. Ask your doctor about these medicines      Dose / Directions   acetaminophen 500 MG tablet  Commonly known  as: TYLENOL      Dose: 500-1,000 mg  Take 500-1,000 mg by mouth every 8 hours as needed for mild pain  Refills: 0     calcitRIOL 0.25 MCG capsule  Commonly known as: ROCALTROL      Dose: 0.25 mcg  Take 0.25 mcg by mouth daily  Refills: 0     calcium carbonate 500 MG chewable tablet  Commonly known as: TUMS      Dose: 1 chew tab  Take 1 chew tab by mouth every 4 hours as needed for heartburn  Refills: 0     digoxin 125 MCG tablet  Commonly known as: LANOXIN      Dose: 125 mcg  Take 125 mcg by mouth three times a week Take on Mon,  Wed, Fri  Refills: 0     enoxaparin ANTICOAGULANT 80 MG/0.8ML syringe  Commonly known as: LOVENOX      Last dose 6/13/21 PM  Refills: 0     ferrous sulfate 325 (65 Fe) MG tablet  Commonly known as: FEROSUL      Take 1 tablet by mouth twice daily on three days a week (Mon Wed and Fri only)  Refills: 0     fexofenadine 180 MG tablet  Commonly known as: ALLEGRA      Dose: 180 mg  Take 180 mg by mouth daily  Refills: 0     losartan 25 MG tablet  Commonly known as: COZAAR      Dose: 12.5 mg  Take 12.5 mg by mouth At Bedtime  Refills: 0     lovastatin 40 MG tablet  Commonly known as: MEVACOR      Dose: 40 mg  Take 40 mg by mouth At Bedtime  Refills: 0     metoprolol succinate ER 25 MG 24 hr tablet  Commonly known as: TOPROL-XL      Dose: 25 mg  Take 25 mg by mouth daily  Refills: 0     multivitamin Tabs tablet      Dose: 1 tablet  Take 1 tablet by mouth 2 times daily  Refills: 0     omeprazole 40 MG DR capsule  Commonly known as: priLOSEC      Dose: 40 mg  Take 40 mg by mouth daily  Refills: 0     PARoxetine 10 MG tablet  Commonly known as: PAXIL      Dose: 10 mg  Take 10 mg by mouth every morning  Refills: 0     potassium chloride ER 10 MEQ CR tablet  Commonly known as: K-TAB/KLOR-CON      Dose: 2 tablet  Take 2 tablets by mouth daily (with dinner)  Refills: 0     torsemide 20 MG tablet  Commonly known as: DEMADEX      Dose: 20 mg  Take 20 mg by mouth daily  Refills: 0     triamcinolone 55  MCG/ACT nasal aerosol  Commonly known as: NASACORT      Dose: 2 spray  Spray 2 sprays into both nostrils daily as needed  Refills: 0     warfarin ANTICOAGULANT 2.5 MG tablet  Commonly known as: COUMADIN      Take 2.5 mg on Wednesday and Take 5 mg on all other days of the week (or as directed). Takes in the evening.  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 Smith on 6/15/2021. Mrs. Emmanuel is followed closely by providers at [facility] and in [place].       She has had non-ischemic cardiomyopathy with diastolic dysfunction following chemotherapy for DLBCL treatment in 2015 and subsequently underwent AICD implantation. In 2016 she received bioprosthetic AVR for aortic stenosis. 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  She subsequently underwent RML lobectomy, her cancer is currently in remission.     Her ascending aortic saccular aneurysm (diameter to 8.6 cm) has been known to the patient and care providers since 10/2019 when it was incidentally diagnosed on enchocardiography follow-up at [facility] for her aortic valve replacement surgery in 2015. At the initial diagnosis, patient wanted time to think about her treatment options, including possible surgical intervention.  Follow-up CT in 02/2020 demonstrated interval enlargement of the aneurysm sac, now measuring up to 8.3 x 4.7 x 7.6 cm. Per outside provider, patient has reported fatigue and dyspnea and requires a  wheelchair to move around despite being able to ambulate for short distances.           Hospital Course:     On 6/15/21, Racquel Emmanuel underwent successful a redo median sternotomy, repair of ascending aortic aneurysm with 32 mm Hemashield platinum graft under deep hypothermic circulatory arrest, and peripheral cardiopulmonary bypass via right femoral artery and vein cannulation by Dr. Gokul Smith.    Intraoperative findings included an extensive large ascending aortic aneurysm.  There was also significant calcification in the ascending aorta.  The aortic valve looked grossly normal.      Postoperatively, patient was taken to the intensive care unit for recovery in stable condition. Patient was extubated within protocol on POD#2.  Blood pressure and cardiac index were managed with vasopressors and inotropic agents which were continuously weaned until no longer needed.  Patient did develop thrombocytopenia POD#2, negative HIT screen and subsequently recovered by discharge. Patient was subsequently transferred to the surgical telemetry floor.    While on the surgical unit, the patient continued to progress well. Chest tubes and temporary pacemaker wires were removed when deemed appropriate.     Patient was fluid overloaded and treated with diuretics. She developed a left pleural effusion causing a need for oxygen during the day requiring a thoracentesis for which 450 mL of dark yellow fluid was drained. She tolerated the procedure well. She remains up about 4 kg from preoperative weight (trending down) and discharged with diuretic therapy; will re-evaluate need in clinic follow-up.      Patient was transiently hyperglycemic and treated with insulin infusion then transitioned to sliding scale insulin per protocol. Blood sugars remained stable. No further glycemic control agents needed at this time.    She was started on coumadin for post-op atrial fibrillation and needs her INR checked 6/28/21 for atrial  fibrillation, goal INR 2-3.      On day of discharge, patient's pain was well controlled, was working well with therapies and stable for discharge to home on 6/26/21. She has coronary artery disease and will discharge on ASA, BB, statin. Follow up with cardiology and cardiac surgery have been arranged. Pt encouraged to follow up with PCP and cardiac rehab upon discharge.               Significant Results:     BMP  Recent Labs   Lab 06/26/21  0559 06/25/21  0726 06/24/21  0549 06/23/21  0618    138 140 138   POTASSIUM 3.9 3.9 4.3 4.1   CHLORIDE 105 105 106 105   CORNELIO 8.4* 8.6 8.2* 8.4*   CO2 32 30 30 30   BUN 19 17 20 23   CR 1.27* 1.17* 1.16* 1.34*   * 107* 108* 106*     CBC  Recent Labs   Lab 06/26/21  0559 06/23/21  0618 06/22/21  0614 06/21/21  0644 06/20/21  0834   WBC  --  7.4 5.7 5.3 7.6   RBC  --  2.82* 2.63* 2.60* 3.06*   HGB 8.0* 8.7* 8.0* 8.1* 9.4*   HCT  --  27.2* 25.8* 25.0* 29.6*   MCV  --  97 98 96 97   MCH  --  30.9 30.4 31.2 30.7   MCHC  --  32.0 31.0* 32.4 31.8   RDW  --  17.0* 17.0* 17.0* 17.2*   PLT  --  189 133* 121* 133*     INR  Recent Labs   Lab 06/26/21  0559 06/25/21  0726 06/24/21  0549 06/23/21  0618   INR 1.43* 1.25* 1.10 1.00      Hepatic Panel  No lab results found in last 7 days.               Imaging:     CXR 6/22/21:  1. Small bilateral pleural effusions.  2. Left basilar and retrocardiac opacities, slightly improved from prior.  3. Aortic atherosclerosis.    Echocardiogram:   Interpretation Summary  S/P AVR with 23mm Trifecta tissue valve on 6/3/2015 at [facility] and S/P  Aortic aneurysm repair with 32mm Hemashield Platinum on 6/16/2021 at [facility].  Global and regional left ventricular function is normal with an EF of 55-60%.  Right ventricular function, chamber size, wall motion, and thickness are normal.  A bioprosthetic aortic valve is present.  The mean gradient across the aortic valve is9 mmHg.  No pericardial effusion is present.   A left pleural effusion is  present.  There is no prior study for direct comparison.    Coronary Angiogram 6/3/21:  Left Main   The vessel was visualized by selective angiography, is large and is angiographically normal. There was 0% vessel disease.   Left Anterior Descending   The vessel is small. Luminal Irregularities   Prox LAD lesion is 35% stenosed.   First Diagonal Branch   The vessel is moderate in size. The vessel exhibits minimal luminal irregularities.   Ramus Intermedius   The vessel is large.   Ramus lesion is 35% stenosed.   Left Circumflex   The vessel is moderate in size. Luminal Irregularities   First Obtuse Marginal Branch   The vessel is small. There is mild diffuse disease throughout the vessel.   Second Obtuse Marginal Branch   The vessel is small.   2nd Mrg lesion is 20% stenosed.   Right Coronary Artery   The vessel was visualized by selective angiography and is large. There was 0% vessel disease.   Prox RCA to Mid RCA lesion is 35% stenosed.   Right Posterior Descending Artery   The vessel is large. The vessel exhibits minimal luminal irregularities.   Right Posterior Atrioventricular Artery   The vessel is large. The vessel exhibits minimal luminal irregularities.   First Right Posterolateral Branch   The vessel is moderate in size. The vessel exhibits minimal luminal irregularities.   Second Right Posterolateral Branch   The vessel is moderate in size. The vessel exhibits minimal luminal irregularities.          Pending Results:     None           Discharge Instructions and Follow-Up:   You had a sternotomy, avoid lifting anything greater than ten pounds for 6 weeks after surgery and then less than 20 pounds for an additional 6 weeks. Do not reach backwards or use arms to push out of chair. Do not let people pull on your arms to assist with standing. Avoid twisting or reaching too far across your body.  Avoid strenuous activities such as bowling, vacuuming, raking, shoveling, golf or tennis for 12 weeks after your  surgery. It is okay to resume sex if you feel comfortable in doing so. You may have to try different positions with your partner.  Splint your chest incision by hugging a pillow or bringing your arms across your chest when coughing or sneezing. Please try to sleep on your back for the first 4-6 weeks to avoid extra stress on your sternum (breastbone) while it is healing.     No driving for 4 weeks after surgery or while on pain medication.    Shower or wash your incisions daily with soap and water (or as instructed), pat dry. Keep wound clean and dry, showers are okay after discharge, but don't let spray hit directly on incision. No baths or swimming for 1 month. Cover chest tube sites with gauze until they stop draining, then leave open to air. It is not abnormal for chest tube sites to drain yellowish/clear fluid for up to 2-3 weeks after surgery.   Watch for signs of infection: increased redness, tenderness, warmth or any drainage from sternum incision.  Also a temperature > 100.5 F or chills. Call your surgeon or primary care provider's office immediately. Remove any skin glue left on incisions after 10-14 days. This will not affect your incision and can speed up healing.    Exercise is very important in your recovery. Please follow the guidelines set up for you in your cardiac rehab classes at the hospital. If outpatient cardiac rehab was ordered for you, we highly recommend you participate. If you have problems arranging your cardiac rehab, please call [phone] for all locations, with the exception of [place], please call [phone] and [place], please call [phone].    Avoid sitting for prolonged periods of time, try to walk every hour during the day. If you have a leg incision, elevate your leg often when you are not walking.    Check your weight when you get home from the hospital and continue to check it daily through your recovery for at least a month. If you notice a weight gain of 2-3  pounds in a week, notify your primary care physician, cardiologist or surgeon.    Bowel activity may be slow after surgery. If necessary, you may take an over the counter laxative such as Milk of Magnesia or Miralax. You may have stool softeners prescribed (docusate sodium, Senokot). We recommend using stool softeners while using narcotics for pain (oxycodone/percocet, hydrocodone/vicodin, hydromorphone/dilaudid).      Wean OFF of narcotics (oxycodone, dilaudid, hydrocodone) as soon as possible. You should continue taking acetaminophen as long as you have any surgical pain as the first choice for pain control and add narcotics as necessary for pain to be tolerable.      DENTAL VISITS AFTER SURGERY  You have a history of your heart valve replaced, we do not recommend having any dental work done for 6 months and you will need to take an antibiotic prior to dental visits, as before.  Please notify your dentist before any procedure for the proper treatment needed. The antibiotic is taken by mouth one hour prior to visit. This includes routine cleanings.    DO NOT SMOKE.  IF YOU NEED HELP QUITTING, PLEASE TALK WITH YOUR CARDIOLOGIST OR PRIMARY DOCTOR.    You are on a blood thinner for atrial fibrillation, follow the instructions you were given in the hospital and DO NOT SKIP this medication. Try and take it the same time everyday. Your primary care physician or coumadin clinic will manage the dosing. INR goal is 2-3 for 3 months.    REGARDING PRESCRIPTION REFILLS.  If you need a refill on your pain medication contact us to discuss your pain and a possible one time refill.   All other medications will be adjusted, discontinued and re-filled by your primary care physician and/or your cardiologist as they were prior to your surgery. We have given you enough for one to three month with possibly one refill.    POST-OPERATIVE CLINIC VISITS  You will now return to the care of your primary provider and your cardiologist. Future  medication refills should come from your PCP or Cardiologist.   You need an INR lab draw on Monday 6/28/21.   You should see your primary care provider in 1-2 weeks after discharge.  It is important to see your cardiologist (Dr Kadi Good) in July as scheduled.      *greater than 30 minutes were spent in chart review and then counseling and instructing patient of ADLs, limitations, reasons to call our clinic, and medication counseling.

## 2021-06-23 NOTE — PLAN OF CARE
6C - Pt with IJ DVT - spoke with provider Kristine from primary service who indicates to proceed with mobility without restriction throughout discharge (which may be end of week), that there is no plan for heparin bridging and IJ clot is line-related, expected, and embolization is not a concern.  They're just trying to get pt back to her baseline INR of 2-3 for a fib prior to disch home.    Cancel, Pt declines due to waiting 2+ hrs for breakfast and is very hungry.  Advocated for pt and spoke with nursing.

## 2021-06-24 ENCOUNTER — APPOINTMENT (OUTPATIENT)
Dept: PHYSICAL THERAPY | Facility: CLINIC | Age: 86
DRG: 220 | End: 2021-06-24
Attending: SURGERY
Payer: MEDICARE

## 2021-06-24 LAB
ANION GAP SERPL CALCULATED.3IONS-SCNC: 4 MMOL/L (ref 3–14)
BUN SERPL-MCNC: 20 MG/DL (ref 7–30)
CALCIUM SERPL-MCNC: 8.2 MG/DL (ref 8.5–10.1)
CHLORIDE SERPL-SCNC: 106 MMOL/L (ref 94–109)
CO2 SERPL-SCNC: 30 MMOL/L (ref 20–32)
CREAT SERPL-MCNC: 1.16 MG/DL (ref 0.52–1.04)
GFR SERPL CREATININE-BSD FRML MDRD: 42 ML/MIN/{1.73_M2}
GLUCOSE SERPL-MCNC: 108 MG/DL (ref 70–99)
INR PPP: 1.1 (ref 0.86–1.14)
MAGNESIUM SERPL-MCNC: 2 MG/DL (ref 1.6–2.3)
PHOSPHATE SERPL-MCNC: 3.5 MG/DL (ref 2.5–4.5)
POTASSIUM SERPL-SCNC: 4.3 MMOL/L (ref 3.4–5.3)
SODIUM SERPL-SCNC: 140 MMOL/L (ref 133–144)

## 2021-06-24 PROCEDURE — 250N000013 HC RX MED GY IP 250 OP 250 PS 637: Performed by: NURSE PRACTITIONER

## 2021-06-24 PROCEDURE — 97116 GAIT TRAINING THERAPY: CPT | Mod: GP

## 2021-06-24 PROCEDURE — 250N000013 HC RX MED GY IP 250 OP 250 PS 637: Performed by: STUDENT IN AN ORGANIZED HEALTH CARE EDUCATION/TRAINING PROGRAM

## 2021-06-24 PROCEDURE — 214N000001 HC R&B CCU UMMC

## 2021-06-24 PROCEDURE — 97530 THERAPEUTIC ACTIVITIES: CPT | Mod: GP

## 2021-06-24 PROCEDURE — 85610 PROTHROMBIN TIME: CPT | Performed by: SURGERY

## 2021-06-24 PROCEDURE — 84100 ASSAY OF PHOSPHORUS: CPT | Performed by: SURGERY

## 2021-06-24 PROCEDURE — 250N000013 HC RX MED GY IP 250 OP 250 PS 637: Performed by: SURGERY

## 2021-06-24 PROCEDURE — 36415 COLL VENOUS BLD VENIPUNCTURE: CPT | Performed by: SURGERY

## 2021-06-24 PROCEDURE — 250N000011 HC RX IP 250 OP 636: Performed by: STUDENT IN AN ORGANIZED HEALTH CARE EDUCATION/TRAINING PROGRAM

## 2021-06-24 PROCEDURE — 83735 ASSAY OF MAGNESIUM: CPT | Performed by: SURGERY

## 2021-06-24 PROCEDURE — 80048 BASIC METABOLIC PNL TOTAL CA: CPT | Performed by: SURGERY

## 2021-06-24 RX ORDER — MAGNESIUM OXIDE 400 MG/1
400 TABLET ORAL 2 TIMES DAILY
Status: COMPLETED | OUTPATIENT
Start: 2021-06-24 | End: 2021-06-25

## 2021-06-24 RX ORDER — WARFARIN SODIUM 5 MG/1
5 TABLET ORAL
Status: COMPLETED | OUTPATIENT
Start: 2021-06-24 | End: 2021-06-24

## 2021-06-24 RX ORDER — METOPROLOL TARTRATE 25 MG/1
25 TABLET, FILM COATED ORAL ONCE
Status: COMPLETED | OUTPATIENT
Start: 2021-06-24 | End: 2021-06-24

## 2021-06-24 RX ADMIN — ASPIRIN 81 MG CHEWABLE TABLET 81 MG: 81 TABLET CHEWABLE at 08:05

## 2021-06-24 RX ADMIN — Medication 1 TABLET: at 08:05

## 2021-06-24 RX ADMIN — TORSEMIDE 20 MG: 20 TABLET ORAL at 08:06

## 2021-06-24 RX ADMIN — Medication 12.5 MG: at 21:51

## 2021-06-24 RX ADMIN — Medication 400 MG: at 08:05

## 2021-06-24 RX ADMIN — LOVASTATIN 40 MG: 20 TABLET ORAL at 20:29

## 2021-06-24 RX ADMIN — ACETAMINOPHEN 650 MG: 325 TABLET, FILM COATED ORAL at 20:28

## 2021-06-24 RX ADMIN — FEXOFENADINE HYDROCHLORIDE 180 MG: 180 TABLET, FILM COATED ORAL at 08:05

## 2021-06-24 RX ADMIN — DOCUSATE SODIUM 50 MG AND SENNOSIDES 8.6 MG 1 TABLET: 8.6; 5 TABLET, FILM COATED ORAL at 20:29

## 2021-06-24 RX ADMIN — METHOCARBAMOL 500 MG: 500 TABLET, FILM COATED ORAL at 04:27

## 2021-06-24 RX ADMIN — METOPROLOL TARTRATE 75 MG: 50 TABLET, FILM COATED ORAL at 20:29

## 2021-06-24 RX ADMIN — METOPROLOL TARTRATE 50 MG: 50 TABLET, FILM COATED ORAL at 08:05

## 2021-06-24 RX ADMIN — ACETAMINOPHEN 650 MG: 325 TABLET, FILM COATED ORAL at 01:08

## 2021-06-24 RX ADMIN — WARFARIN SODIUM 5 MG: 5 TABLET ORAL at 18:03

## 2021-06-24 RX ADMIN — PAROXETINE HYDROCHLORIDE 10 MG: 10 TABLET, FILM COATED ORAL at 08:05

## 2021-06-24 RX ADMIN — PANTOPRAZOLE SODIUM 40 MG: 40 TABLET, DELAYED RELEASE ORAL at 08:06

## 2021-06-24 RX ADMIN — DOCUSATE SODIUM 100 MG: 100 CAPSULE, LIQUID FILLED ORAL at 20:28

## 2021-06-24 RX ADMIN — Medication 400 MG: at 20:29

## 2021-06-24 RX ADMIN — HEPARIN SODIUM 5000 UNITS: 5000 INJECTION, SOLUTION INTRAVENOUS; SUBCUTANEOUS at 15:54

## 2021-06-24 RX ADMIN — METOPROLOL TARTRATE 25 MG: 25 TABLET, FILM COATED ORAL at 10:41

## 2021-06-24 RX ADMIN — HEPARIN SODIUM 5000 UNITS: 5000 INJECTION, SOLUTION INTRAVENOUS; SUBCUTANEOUS at 08:06

## 2021-06-24 RX ADMIN — Medication 10 MG: at 20:28

## 2021-06-24 RX ADMIN — ACETAMINOPHEN 650 MG: 325 TABLET, FILM COATED ORAL at 15:54

## 2021-06-24 ASSESSMENT — ACTIVITIES OF DAILY LIVING (ADL)
ADLS_ACUITY_SCORE: 18

## 2021-06-24 ASSESSMENT — MIFFLIN-ST. JEOR: SCORE: 1026.02

## 2021-06-24 NOTE — PLAN OF CARE
D: S/p ascending aortic aneurysm repair 6/15/21 c/b PFO. 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    I: Monitored vitals and assessed pt status.   Changed: n/a  Running: n/a  PRN: tylenol, oxy    A: A0x4. VSS. SR/ST. On 2 L NC and SPRAGUE. SB assist, steady gait. Non-occlusive LUE thrombus with significant swelling, keep elevated. Tylenol given for arthritic pain. Pt was unable to sleep all night despite melatonin and robaxin given.     P: Continue to monitor and manage Pt INR, pain, and breathing and report changes to CVTS team.

## 2021-06-24 NOTE — PROGRESS NOTES
"Care Coordinator  D/I: I talked with pt's daughter: Dona Emmanuel approx 11:08am as she had called bedside RN and she gave the phone to me. I explained my role and she is pleasant and says that she lives in [place] and will drive her mom home and will stay with her.  I informed her that I sent referral to [facility] yesterday, but they do not have HHA available and she said \"that is not acceptable\" and wishes for me to try [facility] : I have called [phone] #2 and left a message and fax'd to: [phone] @ 11:30am  I verified that her PCP:Nina Orellana @ [facility] follows her warfarin/INR Goal 2-3 pta and she does --per team rounds needs an INR on 6/28--[facility] can do this if I can find an agency.  Equipment: pt has a 4WW/GB in shower with handheld shower  Oxygen: daughter says she never had pta--check with team if she needs it: Per Elizabeth Castanon NP--pt only uses home oxygen at night and agrees with resumption of this.  P: Wait for [facility] to call back  Update [facility]____Will follow.  12:15pm Per Elizabeth Castanon NP--If I cannot find a HH agency with HHA then she will call daughter and suggest she go to [facility].  1:40pm I called [facility] [phone] # 4   Fax: [phone]: Sasha and they have accepted pt for RN/PT/OT and the RN CAN draw an INR Monday, 6/28/21. They have 1 HHA and the HHA CANNOT see pt.--Elizabeth Castanon NP informed.  Per Sasha they will need a RN-RN update on the day of discharge call main #.    Waiting for [facility]  to call back_____IF they have a HHA with RN--INR 6/28/PT/OT will go with them___They have NOT called me back all day (I have tried calling them once more approx 2:45pm with voice mail) I have fax'd a Cancel this referral @ 3:30pm  Per Elizabeth Castanon NP--she call daughter and they do NOT need a HHA--Elizabeth also spoke with pt and she agrees--so will go with [facility]  "

## 2021-06-24 NOTE — PLAN OF CARE
S/p AAA repair on 6/15/21. VSS. A&Ox4. Up in room with SBA- steady. PIV saline locked. SR on the monitor. Tolerating PO intake. 2L supplemental O2. Showered. Incisions healing and old CT sites left TANNA. Left arm swollen d/t known clot- elevated. INR 1.1. Likely to discharge home tomorrow with home healthcare.

## 2021-06-24 NOTE — PROGRESS NOTES
Cardiovascular Surgery Progress Note  06/24/2021           Assessment and Plan:   Racquel Emmanuel is a 86 year old female with a PMH of NICM 2/2 lymphoma tx, aortic insufficiency (s/p bioprosthetic AVR 6/3/2015), HLD, pAfib (on warfarin), HTN, CKD, MIGUELANGEL, GERD (h/o GI bleed 2019), subdural hematoma, lung CA (s/p lobectomy 2017), temporal arteritis, DLBCL (2014), 8 cm ascending aortic aneurysm now s/p redo sternotomy ascending aortic aneurysm repair under [facility] with Dr. Smith on 6/15/2021.      Cardiovascular:   Ascending aortic aneurysm - s/p redo sternotomy and aortic aneuryrsm repair  Hx of bioprosthetic AVR in 2015  HFpEF  PFO   HTN  Most recent echo pre-op 3/1 showed LVEF 55-60%, mild to moderate TR, grade 2 diastolic dysfunction. ABEL post-op with stable moderate AI, along with PFO with left to right flow (not closed).   - ASA 81 mg, PTA statin, PTA losartan. Will increase metoprolol from 50 mg BID to 75 mg BID given continued hypertension and higher heart rates. Would consider increasing losartan 12.5 mg to 25 mg if hypertension persists.   - Restart PTA torsemide 20 mg  - Chest tubes removed 6/21  - Post-op Echo showed LVEF 55-60%, normal RV function  Paroxysmal atrial fibrillation  - Restarted PTA coumadin 6/23, goal INR 2-3.   - Dual Lead ICD set at DDD 50  - TPW removed 6/20  Chest Discomfort, resolved  - Noted left chest discomfort overnight 6/21 which radiated to back and associated SOB. Resolved with oxycodone. Non-reproducible on physical exam.  EKG with no acute findings, likely r/t removal of ES catheters/surgical pain.      Pulmonary:  H/O Lung SCC s/p RML lobectomy   MIGUELANGEL  Extubated POD 2; Saturating well on 2-3 lpm. Wean as tolerated. Only on home O2 at night  Encourage IS, C&DB, ambulating     Neuro/MSK:  Chronic back pain  H/O subdural hematoma (2020), no residual deficits  H/O of delirium  H/O PONV  Post-op pain  - PTA paroxetine, scheduled APAP q8h, Prn methocarbamol / oxycodone/ hydromorphone    - ES catheters removed 6/21, Discontinued Seroquel 50mg at bedtime 6/23, scheduled melatonin at bedtime  - Delirium precautions  Left arm swelling  Generalized edema to left arm and forearm that began immediatly post-op. No loss of sensation or pain. Ecchymosis noted on left arm just distal to armpit. Keep elevated.      /Renal/Fluids:  CKD (baseline Cr 1.6)  Acute kidney injury  Pre-op weight 58.1 kgs, Current weight 63.3 kg; Creatinine 1.16.   Diuresis - restarted on PTA torsemide 20 mg PO daily 6/19, received lasix 40 mg IV x1 6/19 PM and 6/20 afternoon. 2mg PO bumex 6/21.   - Continue PTA torsemide 20 mg. Net +60 yesterday, although only 300 output noted. Currently still 5 kg above pre-op weight.      GI/FEN:   IBS  GERD  Hx GI bleed, resolved  Cardiac diet  - PPI daily   - continue bowel regimen, + BM  - Replace lytes as needed     Endo:  Stress induced hyperglycemia   Managed on insulin drip postop, transitioned to sliding scale goal BG <180. BG well controlled     Heme:   H/O B Cell lymphoma  Acute blood loss anemia and thrombocytopenia  - HIT negative for thrombocytopenia  - Hgb stable; Plt improving and now WNL.      ID:  Stress induced leukocytosis   - Completed perioperative antibiotics  - WBC WNL, afebrile, no signs or symptoms of infection     HEENT:  Congestion, Seasonal Allergies  - Noted increased congestion and right ear plugged 6/22. PTA on Nasacort and Allegra.   - Continue PO fexofenadine daily     Prophylaxis:   Stress ulcer prophylaxis: Pantoprazole 40 mg daily  DVT prophylaxis: Subcutaneous heparin, PCD     Dispo:   Transferred to  on 6/19  Rehab recommending discharge to home with outpatient therapies   Discharge likely tomorrow. Will need outpatient INR check set up for Monday.     Staff surgeons have been informed of changes through both verbal and written communication.       CARMEN Johnston-S  Cardiothoracic Surgery     I, Elizabeth Castanon , was present with the PA student who  participated in the service and in the documentation of the note.  I have verified the history and personally performed the physical exam and medical decision making.  I agree with the assessment and plan of care as documented in the note.      CLEMENT Wilkins, ACN-AG, CCRN  Nurse Practitioner  Cardiothoracic Surgery  Pager: [phone]  Date of Service (when I saw the patient): 06/24/21        Interval History:   Did not sleep well overnight as she was awoken to move rooms.    Appetite still diminished, although notes the diet restrictions here are limiting what she would like to eat.   Breathing continues to improve with minimal SPRAGUE, which is baseline.   Passing flatus, + BM. Denies palpitations, dizziness, syncopal symptoms, chills, myalgias or sternal popping/clicking.          Medications:       aspirin  81 mg Oral or Feeding Tube Daily     docusate sodium  100 mg Oral BID     fexofenadine  180 mg Oral Daily     heparin ANTICOAGULANT  5,000 Units Subcutaneous Q8H     losartan  12.5 mg Oral At Bedtime     lovastatin  40 mg Oral At Bedtime     magnesium oxide  400 mg Oral BID     melatonin  10 mg Oral or Feeding Tube QPM     metoprolol tartrate  75 mg Oral BID     multivitamin w/minerals  1 tablet Oral Daily     pantoprazole  40 mg Oral Daily     PARoxetine  10 mg Oral QAM     polyethylene glycol  17 g Oral Daily     senna-docusate  1 tablet Oral BID     sodium chloride (PF)  3 mL Intracatheter Q8H     torsemide  20 mg Oral Daily     acetaminophen, sore throat lozenge, bisacodyl, dextrose, glucose **OR** dextrose **OR** glucagon, diphenhydrAMINE **OR** diphenhydrAMINE, hydrALAZINE, HYDROmorphone, ipratropium - albuterol 0.5 mg/2.5 mg/3 mL, lidocaine 4%, lidocaine (buffered or not buffered), magnesium hydroxide, methocarbamol, naloxone **OR** naloxone **OR** naloxone **OR** naloxone, ondansetron **OR** ondansetron, oxyCODONE **OR** oxyCODONE, prochlorperazine **OR** prochlorperazine, BETA BLOCKER NOT  "PRESCRIBED, sodium chloride (PF), sodium phosphate, Warfarin Therapy Reminder          Physical Exam:   Vitals were reviewed  Blood pressure (!) 140/66, pulse 90, temperature 98.2  F (36.8  C), temperature source Oral, resp. rate 20, height 1.575 m (5' 2\"), weight 63.3 kg (139 lb 8 oz), SpO2 94 %, not currently breastfeeding.  Vitals:    06/22/21 0555 06/23/21 0000 06/24/21 0209   Weight: 67 kg (147 lb 11.3 oz) 64.2 kg (141 lb 9.6 oz) 63.3 kg (139 lb 8 oz)      I/O last 3 completed shifts:  In: 360 [P.O.:360]  Out: -   Unable to track    Gen: A&Ox4, NAD  CV: RRR, normal S1, S2, no murmurs, rubs or gallops   Pulm: Lungs diminished in bases, otherwise CTA, No wheezing or rhonchi  Abd: Soft, NT, ND, +BS  Ext: 1+ bilateral LE edema, 1+ edema to right arm with ecchymosis to LUE just distal to armpit   Neuro:  Nonfocal, moving all extremities spontaneously   Incision: c/d/i, no erythema, sternum stable         Data:    All labs and imaging reviewed by me.  Labs:    Data   BMP  Recent Labs   Lab 06/24/21  0549 06/23/21  0618 06/22/21  0614 06/21/21  0644    138 138 140   POTASSIUM 4.3 4.1 4.2 4.3   CHLORIDE 106 105 106 105   CORNELIO 8.2* 8.4* 7.8* 7.7*   CO2 30 30 31 31   BUN 20 23 26 29   CR 1.16* 1.34* 1.33* 1.42*   * 106* 93 109*     CBC  Recent Labs   Lab 06/23/21  0618 06/22/21  0614 06/21/21  0644 06/20/21  0834   WBC 7.4 5.7 5.3 7.6   RBC 2.82* 2.63* 2.60* 3.06*   HGB 8.7* 8.0* 8.1* 9.4*   HCT 27.2* 25.8* 25.0* 29.6*   MCV 97 98 96 97   MCH 30.9 30.4 31.2 30.7   MCHC 32.0 31.0* 32.4 31.8   RDW 17.0* 17.0* 17.0* 17.2*    133* 121* 133*     INR  Recent Labs   Lab 06/24/21  0549 06/23/21  0618 06/22/21  0846   INR 1.10 1.00 1.04      Hepatic Panel   Recent Labs   Lab 06/19/21  0620 06/18/21  0303   AST 24 16   ALT 19 24   ALKPHOS 53 45   BILITOTAL 0.5 0.6   ALBUMIN 2.2* 2.1*     Glucose  Recent Labs   Lab 06/24/21  0549 06/23/21  0618 06/22/21  0614 06/21/21  0644 06/20/21  1635 06/20/21  0834 " 21  2312 21  2312 21  0732 21  0405 21  0350 21  2342 21  2342 21  2121   * 106* 93 109* 116* 175*   < >  --   --   --   --    < >  --   --    BGM  --   --   --   --   --   --   --  106* 111* 156* 62*  --  93 116*    < > = values in this interval not displayed.     Imaging:  Recent Results (from the past 24 hour(s))   Echocardiogram Complete    Narrative    [id number]  [id number]  [id number]  [id number]^MAXIM^LUACS^ANNA     [facility],[place]  Echocardiography Laboratory  [address] [place] [id number]     Name: ABRAM BALDERAS  MRN: [id number]  : 1935  Study Date: 2021 02:29 PM  Age: 86 yrs  Gender: Female  Patient Location: [facility]  Reason For Study: S/p aortic aneurysm repair  History: Repair of ascending aortic aneurysm with 32 mm Hemashield platinum  graft  Ordering Physician: LUCAS PAN  Performed By: FIDENCIO Tan     BSA: 1.6 m2  Height: 62 in  Weight: 141 lb  BP: 133/70 mmHg  ______________________________________________________________________________  Procedure  Complete Portable Echo Adult. Technically difficult study.Extremely poor  acoustic windows. Limited information was obtained during study.  ______________________________________________________________________________  Interpretation Summary  S/P AVR with 23mm Trifecta tissue valve on 6/3/2015 at [facility] and S/P  Aortic aneurysm repair with 32mm Hemashield Platinum on 2021 at [facility].  Global and regional left ventricular function is normal with an EF of 55-60%.  Right ventricular function, chamber size, wall motion, and thickness are  normal.  A bioprosthetic aortic valve is present.  The mean gradient across the aortic valve is9 mmHg.  No pericardial effusion is present.  A left pleural effusion is present.  There is no prior study for direct  comparison.  ______________________________________________________________________________  Left Ventricle  Left ventricular size is normal. Left ventricular wall thickness is normal.  Global and regional left ventricular function is normal with an EF of 55-60%.  S/P AVR with 23mm Trifecta tissue valve on 6/3/2015 at [facility] and S/P  Aortic aneurysm repair with 32mm Hemashield Platinum on 2021 at [facility].  Left ventricular diastolic function is not assessable. No regional wall motion  abnormalities are seen.     Right Ventricle  Right ventricular function, chamber size, wall motion, and thickness are  normal. A pacemaker lead is noted in the right ventricle.     Atria  The atria cannot be assessed.     Mitral Valve  Mild to moderate mitral annular calcification is present. The mean mitral  valve gradient is 3.0 mmHg. The peak mitral valve gradient is 4.9 mmHg.     Aortic Valve  The mean gradient across the aortic valve is9 mmHg. A bioprosthetic aortic  valve is present.     Tricuspid Valve  The valve leaflets are not well visualized. On Doppler interrogation, there is  no significant stenosis or regurgitation.     Pulmonic Valve  The pulmonic valve cannot be assessed.     Vessels  Hepatic vein flow is normal. The aorta root cannot be assessed. The thoracic  aorta cannot be assessed.     Pericardium  No pericardial effusion is present.     Miscellaneous  A left pleural effusion is present.     Compared to Previous Study  There is no prior study for direct comparison.     Attestation  I have personally viewed the imaging and agree with the interpretation and  report as documented by the fellow, Mata Trejo, and/or edited by me.  ______________________________________________________________________________  Doppler Measurements & Calculations  MV E max brendan: 91.8 cm/sec  MV A max brendan: 110.0 cm/sec  MV E/A: 0.83  MV max P.9 mmHg  MV mean PG: 3.0 mmHg  MV V2 VTI: 25.1 cm  MV dec slope: 474.0 cm/sec2  Ao V2  max: 200.0 cm/sec  Ao max P.0 mmHg  Ao V2 mean: 145.0 cm/sec  Ao mean P.0 mmHg  Ao V2 VTI: 36.1 cm  LV V1 max P.8 mmHg  LV V1 max: 130.0 cm/sec  LV V1 VTI: 26.1 cm  PA V2 max: 73.7 cm/sec  PA max P.2 mmHg  PA acc time: 0.09 sec     AV Tray Ratio (DI): 0.65  E/E' av.5  Lateral E/e': 18.7  Medial E/e': 14.3     ______________________________________________________________________________  Report approved by: Marline Ramachandran 2021 04:11 PM

## 2021-06-24 NOTE — PROGRESS NOTES
Shift: 0700 - 1930    PMH of [facility] 2/2 lymphoma tx, aortic insufficiency (s/p bioprosthetic AVR 6/3/2015), HLD, pAfib (on warfarin), HTN, CKD, MIGUELANGEL, GERD (h/o GI bleed 2019), subdural hematoma, lung CA (s/p lobectomy 2017), temporal arteritis, DLBCL (2014), 8cm ascending aortic aneurysm now s/p redo sternotomy ascending aortic aneurysm repair under [facility] with Dr. Smith on 6/15/2021.     VS: Temp: 98.3  F (36.8  C) Temp src: Oral BP: (!) 162/79 Pulse: 104   Resp: 16 SpO2: 94 % O2 Device: Nasal cannula Oxygen Delivery: 2 LPM  Pain: reports incisional pain, tylenol given with decreased pain reported.   Neuro: A&Ox4. Calls appropriately, cooperative with care.   Cardiac:   SR/ST. HTN this evening SBP >160 gave hydralazine 10mg IVP. Recheck 143/80. Patient has PFO, filter on IV. Non-occlusive LUE thrombus, CMS/Neuro's intact.   Respiratory: Lung sounds clear/diminished on RA. Tachypneic. SPRAGUE.   GI/Diet/Appetite: Low sat/fat diet with good appetite, LBM 6/23.   :  Voiding w/o difficulty.   LDA's: PIV to RUSTEPHANIE, SL.  Skin: Ecchymosis to LUE, bruising, Chest incision TANNA and old CT site, Dressing CDI.   Activity: SBA w/walker.   Tests/Procedures: ECHO LVEF 55-60%  Pertinent Labs/Lab Collection: INR today 1.0     Plan: INR goal 2-3

## 2021-06-24 NOTE — PLAN OF CARE
OT 6C: cancel, pt working with physical therapy this PM then fatigued after, wanting to rest. Unable to check back later in day with schedule demands, will reschedule.

## 2021-06-25 ENCOUNTER — APPOINTMENT (OUTPATIENT)
Dept: OCCUPATIONAL THERAPY | Facility: CLINIC | Age: 86
DRG: 220 | End: 2021-06-25
Attending: SURGERY
Payer: MEDICARE

## 2021-06-25 ENCOUNTER — APPOINTMENT (OUTPATIENT)
Dept: GENERAL RADIOLOGY | Facility: CLINIC | Age: 86
DRG: 220 | End: 2021-06-25
Attending: NURSE PRACTITIONER
Payer: MEDICARE

## 2021-06-25 LAB
ANION GAP SERPL CALCULATED.3IONS-SCNC: 3 MMOL/L (ref 3–14)
BUN SERPL-MCNC: 17 MG/DL (ref 7–30)
CALCIUM SERPL-MCNC: 8.6 MG/DL (ref 8.5–10.1)
CHLORIDE SERPL-SCNC: 105 MMOL/L (ref 94–109)
CO2 SERPL-SCNC: 30 MMOL/L (ref 20–32)
CREAT SERPL-MCNC: 1.17 MG/DL (ref 0.52–1.04)
GFR SERPL CREATININE-BSD FRML MDRD: 42 ML/MIN/{1.73_M2}
GLUCOSE SERPL-MCNC: 107 MG/DL (ref 70–99)
INR PPP: 1.25 (ref 0.86–1.14)
MAGNESIUM SERPL-MCNC: 2.1 MG/DL (ref 1.6–2.3)
PHOSPHATE SERPL-MCNC: 3.6 MG/DL (ref 2.5–4.5)
POTASSIUM SERPL-SCNC: 3.9 MMOL/L (ref 3.4–5.3)
SODIUM SERPL-SCNC: 138 MMOL/L (ref 133–144)

## 2021-06-25 PROCEDURE — 71046 X-RAY EXAM CHEST 2 VIEWS: CPT

## 2021-06-25 PROCEDURE — 97535 SELF CARE MNGMENT TRAINING: CPT | Mod: GO | Performed by: OCCUPATIONAL THERAPIST

## 2021-06-25 PROCEDURE — 250N000013 HC RX MED GY IP 250 OP 250 PS 637: Performed by: SURGERY

## 2021-06-25 PROCEDURE — 250N000011 HC RX IP 250 OP 636: Performed by: NURSE PRACTITIONER

## 2021-06-25 PROCEDURE — 250N000011 HC RX IP 250 OP 636: Performed by: STUDENT IN AN ORGANIZED HEALTH CARE EDUCATION/TRAINING PROGRAM

## 2021-06-25 PROCEDURE — 85610 PROTHROMBIN TIME: CPT | Performed by: SURGERY

## 2021-06-25 PROCEDURE — 250N000013 HC RX MED GY IP 250 OP 250 PS 637: Performed by: NURSE PRACTITIONER

## 2021-06-25 PROCEDURE — 250N000013 HC RX MED GY IP 250 OP 250 PS 637: Performed by: STUDENT IN AN ORGANIZED HEALTH CARE EDUCATION/TRAINING PROGRAM

## 2021-06-25 PROCEDURE — 36415 COLL VENOUS BLD VENIPUNCTURE: CPT | Performed by: SURGERY

## 2021-06-25 PROCEDURE — 83735 ASSAY OF MAGNESIUM: CPT | Performed by: SURGERY

## 2021-06-25 PROCEDURE — 214N000001 HC R&B CCU UMMC

## 2021-06-25 PROCEDURE — 0W9B3ZZ DRAINAGE OF LEFT PLEURAL CAVITY, PERCUTANEOUS APPROACH: ICD-10-PCS | Performed by: INTERNAL MEDICINE

## 2021-06-25 PROCEDURE — 71046 X-RAY EXAM CHEST 2 VIEWS: CPT | Mod: 26 | Performed by: RADIOLOGY

## 2021-06-25 PROCEDURE — 84100 ASSAY OF PHOSPHORUS: CPT | Performed by: SURGERY

## 2021-06-25 PROCEDURE — 32555 ASPIRATE PLEURA W/ IMAGING: CPT | Performed by: INTERNAL MEDICINE

## 2021-06-25 PROCEDURE — 80048 BASIC METABOLIC PNL TOTAL CA: CPT | Performed by: SURGERY

## 2021-06-25 RX ORDER — WARFARIN SODIUM 7.5 MG/1
7.5 TABLET ORAL
Status: COMPLETED | OUTPATIENT
Start: 2021-06-25 | End: 2021-06-25

## 2021-06-25 RX ORDER — LOSARTAN POTASSIUM 25 MG/1
25 TABLET ORAL AT BEDTIME
Status: DISCONTINUED | OUTPATIENT
Start: 2021-06-25 | End: 2021-06-26 | Stop reason: HOSPADM

## 2021-06-25 RX ORDER — FUROSEMIDE 10 MG/ML
40 INJECTION INTRAMUSCULAR; INTRAVENOUS ONCE
Status: COMPLETED | OUTPATIENT
Start: 2021-06-25 | End: 2021-06-25

## 2021-06-25 RX ADMIN — DOCUSATE SODIUM 100 MG: 100 CAPSULE, LIQUID FILLED ORAL at 20:04

## 2021-06-25 RX ADMIN — Medication 400 MG: at 09:22

## 2021-06-25 RX ADMIN — METOPROLOL TARTRATE 75 MG: 50 TABLET, FILM COATED ORAL at 20:04

## 2021-06-25 RX ADMIN — LOVASTATIN 40 MG: 20 TABLET ORAL at 20:49

## 2021-06-25 RX ADMIN — ACETAMINOPHEN 650 MG: 325 TABLET, FILM COATED ORAL at 09:22

## 2021-06-25 RX ADMIN — METHOCARBAMOL 500 MG: 500 TABLET, FILM COATED ORAL at 22:44

## 2021-06-25 RX ADMIN — FEXOFENADINE HYDROCHLORIDE 180 MG: 180 TABLET, FILM COATED ORAL at 09:20

## 2021-06-25 RX ADMIN — ACETAMINOPHEN 650 MG: 325 TABLET, FILM COATED ORAL at 22:44

## 2021-06-25 RX ADMIN — PAROXETINE HYDROCHLORIDE 10 MG: 10 TABLET, FILM COATED ORAL at 11:23

## 2021-06-25 RX ADMIN — Medication 1 TABLET: at 09:20

## 2021-06-25 RX ADMIN — LOSARTAN POTASSIUM 25 MG: 25 TABLET, FILM COATED ORAL at 20:48

## 2021-06-25 RX ADMIN — METOPROLOL TARTRATE 75 MG: 50 TABLET, FILM COATED ORAL at 09:21

## 2021-06-25 RX ADMIN — Medication 10 MG: at 20:05

## 2021-06-25 RX ADMIN — ACETAMINOPHEN 650 MG: 325 TABLET, FILM COATED ORAL at 17:53

## 2021-06-25 RX ADMIN — Medication 400 MG: at 20:05

## 2021-06-25 RX ADMIN — HEPARIN SODIUM 5000 UNITS: 5000 INJECTION, SOLUTION INTRAVENOUS; SUBCUTANEOUS at 20:02

## 2021-06-25 RX ADMIN — ASPIRIN 81 MG CHEWABLE TABLET 81 MG: 81 TABLET CHEWABLE at 09:22

## 2021-06-25 RX ADMIN — HEPARIN SODIUM 5000 UNITS: 5000 INJECTION, SOLUTION INTRAVENOUS; SUBCUTANEOUS at 09:25

## 2021-06-25 RX ADMIN — PANTOPRAZOLE SODIUM 40 MG: 40 TABLET, DELAYED RELEASE ORAL at 09:20

## 2021-06-25 RX ADMIN — ACETAMINOPHEN 650 MG: 325 TABLET, FILM COATED ORAL at 02:47

## 2021-06-25 RX ADMIN — WARFARIN SODIUM 7.5 MG: 7.5 TABLET ORAL at 17:53

## 2021-06-25 RX ADMIN — FUROSEMIDE 40 MG: 10 INJECTION, SOLUTION INTRAVENOUS at 09:26

## 2021-06-25 ASSESSMENT — ACTIVITIES OF DAILY LIVING (ADL)
ADLS_ACUITY_SCORE: 16
ADLS_ACUITY_SCORE: 18
ADLS_ACUITY_SCORE: 16
ADLS_ACUITY_SCORE: 18
ADLS_ACUITY_SCORE: 16
ADLS_ACUITY_SCORE: 18

## 2021-06-25 ASSESSMENT — MIFFLIN-ST. JEOR: SCORE: 1018.31

## 2021-06-25 NOTE — PROGRESS NOTES
SPIRITUAL HEALTH SERVICES Progress Note  Unit 6C    Follow up visit with patient. Pt said was doing ok, but that they were going to take a chest X-Ray to see if there is fluid in her lung. She is hoping to go home today, or at least go home in time to spend her 67th anniversary with her  at home. Asked for a prayer for healing and for all that is happening.     Plan: Will follow up next week if still on unit.     Marija Jarvis   Intern  Pager [phone]

## 2021-06-25 NOTE — PROGRESS NOTES
Cardiovascular Surgery Progress Note  06/25/2021           Assessment and Plan:   Racquel Emmanuel is a 86 year old female with a PMH of NICM 2/2 lymphoma tx, aortic insufficiency (s/p bioprosthetic AVR 6/3/2015), HLD, pAfib (on warfarin), HTN, CKD, MIGUELANGEL, GERD (h/o GI bleed 2019), subdural hematoma, lung CA (s/p lobectomy 2017), temporal arteritis, DLBCL (2014), 8 cm ascending aortic aneurysm now s/p redo sternotomy ascending aortic aneurysm repair under [facility] with Dr. Smith on 6/15/2021.      Cardiovascular:   Ascending aortic aneurysm - s/p redo sternotomy and aortic aneuryrsm repair  Hx of bioprosthetic AVR in 2015  HFpEF  PFO   HTN  Most recent echo pre-op 3/1 showed LVEF 55-60%, mild to moderate TR, grade 2 diastolic dysfunction.   ABEL post-op with stable moderate AI, along with PFO with left to right flow (not closed).   Echo on 6/23 demonstrated LVEF of 55-60%, normal RV function and gradient across aortic valve  9 mm Hg.  - ASA 81 mg, PTA statin, PTA losartan.   - Metoprolol 75 mg BID   - Losartan increase to 25 mg daily  - Restart PTA torsemide 20 mg    Chest tubes removed 6/21  TPW removed 6/20    Paroxysmal atrial fibrillation  Present preoperatively, on warfarin PTA  - Restarted PTA coumadin 6/23, goal INR 2-3.   - Dual Lead ICD set at DDD 50    Chest Discomfort, resolved  - Noted left chest discomfort overnight 6/21 which radiated to back and associated SOB. Resolved with oxycodone. Non-reproducible on physical exam.  EKG with no acute findings, likely r/t removal of ES catheters/surgical pain.      Pulmonary:  H/O Lung SCC s/p RML lobectomy   MIGUELANGEL  Home O2 at night chronically, has equipment at home  Extubated POD 2; Saturating well on 2 lpm at night.   Requiring some oxygen this AM with marginal saturations on room air  - Continue to encourage IS, C&DB, ambulating  - 2-view CXR this AM to evaluate for effusions  - Will consult procedural team for potential thoracentesis    Neuro/MSK:  Chronic back  pain  H/O subdural hematoma (2020), no residual deficits  H/O of delirium  H/O PONV  Post-op pain  - PTA paroxetine, scheduled APAP q8h, Prn methocarbamol / oxycodone  - ES catheters removed 6/21  - Discontinued Seroquel 50mg at bedtime 6/23  - Scheduled melatonin at bedtime  - Delirium precautions    Left arm swelling, improving  Generalized edema to left arm and forearm that began immediatly post-op. No loss of sensation or pain. Ecchymosis noted on left arm just distal to armpit.   - Keep elevated, warm compresses PRN.      /Renal/Fluids:  CKD (baseline Cr 1.6)  Acute kidney injury, resolved  Pre-op weight 58.1 kgs, Current weight 63.3 kg, needs weight today; Creatinine 1.17.   - Diuresis - restarted on PTA torsemide 20 mg PO daily 6/19, hold today   - Lasix 40 mg IV x1 today      GI/FEN:   IBS  GERD  Hx GI bleed, resolved  Cardiac diet  - PPI daily   - continue bowel regimen, + BM  - Replete lytes as needed     Endo:  Stress induced hyperglycemia, resolved  Managed on insulin drip postop, transitioned to sliding scale goal BG <180.   Euglycemic, no further insulin needs    Heme:   H/O B Cell lymphoma  Acute blood loss anemia, stable; and thrombocytopenia, resolved  - HIT negative for thrombocytopenia  - Hgb stable; Plt improving and now WNL.      ID:  Stress induced leukocytosis   - Completed perioperative antibiotics  - WBC WNL, afebrile, no signs or symptoms of infection     HEENT:  Congestion, Seasonal Allergies, improved  Noted increased congestion and right ear plugged 6/22. PTA on Nasacort and Allegra.   - Continue PO fexofenadine daily     Prophylaxis:   Stress ulcer prophylaxis: Pantoprazole 40 mg daily  DVT prophylaxis: Subcutaneous heparin, PCD     Dispo:   Transferred to  on 6/19  Rehab recommending discharge to home with outpatient therapies/home care  Discharge likely today vs tomorrow pending xray and O2 needs. Will need outpatient INR check set up for Monday.     Staff surgeons have been  informed of changes through both verbal and written communication.       CLEMENT Wilkins, [facility]-, CCRN  Nurse Practitioner  Cardiothoracic Surgery  Pager: [phone]  Date of Service (when I saw the patient): 06/25/21        Interval History:   No acute events overnight. States pain is well managed on current regimen, Breathing is stable but requiring oxygen this AM; IS to 500 mL.. Tolerating diet, is passing flatus, +BM. Denies chest pain, palpitations, dizziness, syncopal symptoms, chills, myalgias, sternal popping/clicking.       Medications:       aspirin  81 mg Oral or Feeding Tube Daily     docusate sodium  100 mg Oral BID     fexofenadine  180 mg Oral Daily     furosemide  40 mg Intravenous Once     heparin ANTICOAGULANT  5,000 Units Subcutaneous Q8H     losartan  25 mg Oral At Bedtime     lovastatin  40 mg Oral At Bedtime     magnesium oxide  400 mg Oral BID     melatonin  10 mg Oral or Feeding Tube QPM     metoprolol tartrate  75 mg Oral BID     multivitamin w/minerals  1 tablet Oral Daily     pantoprazole  40 mg Oral Daily     PARoxetine  10 mg Oral QAM     polyethylene glycol  17 g Oral Daily     senna-docusate  1 tablet Oral BID     sodium chloride (PF)  3 mL Intracatheter Q8H     [Held by provider] torsemide  20 mg Oral Daily     warfarin ANTICOAGULANT  7.5 mg Oral ONCE at 18:00     acetaminophen, sore throat lozenge, bisacodyl, dextrose, glucose **OR** dextrose **OR** glucagon, diphenhydrAMINE **OR** diphenhydrAMINE, hydrALAZINE, HYDROmorphone, ipratropium - albuterol 0.5 mg/2.5 mg/3 mL, lidocaine 4%, lidocaine (buffered or not buffered), magnesium hydroxide, methocarbamol, naloxone **OR** naloxone **OR** naloxone **OR** naloxone, ondansetron **OR** ondansetron, oxyCODONE **OR** oxyCODONE, prochlorperazine **OR** prochlorperazine, BETA BLOCKER NOT PRESCRIBED, sodium chloride (PF), sodium phosphate, Warfarin Therapy Reminder          Physical Exam:   Vitals were reviewed  Blood pressure (Abnormal)  "143/71, pulse 89, temperature 98.2  F (36.8  C), temperature source Oral, resp. rate 18, height 1.575 m (5' 2\"), weight 63.3 kg (139 lb 8 oz), SpO2 95 %, not currently breastfeeding.  Vitals:    06/22/21 0555 06/23/21 0000 06/24/21 0209   Weight: 67 kg (147 lb 11.3 oz) 64.2 kg (141 lb 9.6 oz) 63.3 kg (139 lb 8 oz)      No intake/output data recorded.  Unable to track    Gen: A&Ox4, NAD  CV: RRR, normal S1, S2, no murmurs, rubs or gallops   Pulm: Lungs sounds CTA, no wheezing or rhonchi  Abd: Soft, NT, ND, +BS  Ext: 1+ bilateral LE edema, R > L; 1+ edema to right arm with ecchymosis to LUE just distal to armpit, improving  Neuro:  Nonfocal, moving all extremities spontaneously   Incision: c/d/i, no erythema, sternum stable         Data:    All labs and imaging reviewed by me.  Labs:    Data   BMP  Recent Labs   Lab 06/25/21  0726 06/24/21  0549 06/23/21  0618 06/22/21  0614    140 138 138   POTASSIUM 3.9 4.3 4.1 4.2   CHLORIDE 105 106 105 106   CORNELIO 8.6 8.2* 8.4* 7.8*   CO2 30 30 30 31   BUN 17 20 23 26   CR 1.17* 1.16* 1.34* 1.33*   * 108* 106* 93     CBC  Recent Labs   Lab 06/23/21  0618 06/22/21  0614 06/21/21  0644 06/20/21  0834   WBC 7.4 5.7 5.3 7.6   RBC 2.82* 2.63* 2.60* 3.06*   HGB 8.7* 8.0* 8.1* 9.4*   HCT 27.2* 25.8* 25.0* 29.6*   MCV 97 98 96 97   MCH 30.9 30.4 31.2 30.7   MCHC 32.0 31.0* 32.4 31.8   RDW 17.0* 17.0* 17.0* 17.2*    133* 121* 133*     INR  Recent Labs   Lab 06/25/21  0726 06/24/21  0549 06/23/21  0618 06/22/21  0846   INR 1.25* 1.10 1.00 1.04      Hepatic Panel   Recent Labs   Lab 06/19/21  0620   AST 24   ALT 19   ALKPHOS 53   BILITOTAL 0.5   ALBUMIN 2.2*     Glucose  Recent Labs   Lab 06/25/21  0726 06/24/21  0549 06/23/21  0618 06/22/21  0614 06/21/21  0644 06/20/21  1635 06/18/21  2312 06/18/21  2312   * 108* 106* 93 109* 116*   < >  --    BGM  --   --   --   --   --   --   --  106*    < > = values in this interval not displayed.     Imaging:  No results found " for this or any previous visit (from the past 24 hour(s)).

## 2021-06-25 NOTE — PLAN OF CARE
D: Pt POD 10 AAA repair. Pt stable and comfortable. Thoracentesis at bedside with 450 mL output. Puncture site pain relieved with tylenol. No shortness of breath noted. Pt weaning from O2 NC in anticipation of discharge 6/26.   I: Monitored/assessed pt. Assisted with cares.  A: Pt stable and comfortable.  P: Continue to monitor/assess pt, contact provider with concerns.

## 2021-06-25 NOTE — PROGRESS NOTES
Procedure Note    Attending: Dr. Hooks   Resident: none  Procedure: Left Thoracentesis  Indication: Pleural Effusion   Risk Assessment: Normal   Pre-procedure diagnosis: Pleural effusion   Post-procedure diagnosis: Same    The risks and benefits of the procedure were explained to Racquel Emmanuel who expressed understanding and opted to proceed.  Consent was obtained and placed in the chart and the patient was placed on pulse oximetry.  A time out was performed.    An ultrasound probe was used to identify an area of pleural fluid in the left hemithorax.  This area was prepped and draped in the usual sterile fashion.   5 ml of 1% lidocaine was instilled and fluid located using ultrasound guidance.  A small incision was not made with an 11 blade.  The thoracentesis catheter and needle were inserted above the rib until fluid obtained then the needle removed.    Using a one-way valve, 450 ml of serosangionous colored fluid was removed. A specimen was not sent for analysis.    The catheter was removed with the patient exhaling and an occlusive dressing placed.       Ultrasound revealed normal lung sliding after the procedure and a chest radiograph is pending.    The tolerated the procedure well.  Please contact the Consult and Procedure Service if any concerns or complications arise.       Osmel Hooks MD on 6/25/2021 at 2:44 PM

## 2021-06-25 NOTE — PLAN OF CARE
Pt s/p AAA repair 6/15/21. Mid sternal incision healing well. Attempt made to wean pt off O2 but she became SOB and had sats in the 80s. Put back on 2lnc, feeling better. May need O2 to go home with her at discharge tomorrow. L arm still swollen from DVT. VSS, SR. C/o R ear congestion. Asked whether I should contact for drops MD and she declined. Takes allegra for allergies.

## 2021-06-25 NOTE — PLAN OF CARE
Temp: 98.4  F (36.9  C) Temp src: Oral BP: (!) 143/67 Pulse: 77   Resp: 16 SpO2: 96 % O2 Device: Nasal cannula Oxygen Delivery: 2 LPM     D: S/p redo sternotomy ascending aortic aneurysm repair 6/15. Hx NICM 2/2 lymphoma tx, AVR (2015), HLD, pAfib (on warfarin), HTN, CKD, MIGUELANGEL, GERD (h/o GI bleed 2019), subdural hematoma, lung ca (s/p lobectomy 2017), temporal arteritis     I/A: Racquel (she/her) is A&O x4. Tele in place, SR. VSS on 2L NC. PIV in place, SL. Incisions WDL. Tylenol given for back pain with relief. Up independently. Slept well overnight    P: Continue to monitor and follow POC. Possible discharge 6/25 with home health. Notify [facility] with changes

## 2021-06-25 NOTE — PROGRESS NOTES
Care Management Follow Up    Length of Stay (days): 10    Expected Discharge Date: 06/26/21     Concerns to be Addressed: care coordination/care conferences     Patient plan of care discussed at interdisciplinary rounds: Yes    Anticipated Discharge Disposition: Home      Anticipated Discharge Services:  Home care for RN, PT, OT  Anticipated Discharge DME: Oxygen (had previously)    Patient/family educated on Medicare website which has current facility and service quality ratings:  Yes  Education Provided on the Discharge Plan:  Yes  Patient/Family in Agreement with the Plan: yes    Referrals Placed by CM/SW:  Home care   Private pay costs discussed: Not applicable    Additional Information:  This writer received a call from Joi with [facility] ([phone]). She was requesting for signed orders, discharge summary, and nurse to nurse report. This writer called and left VM for her that orders are typically signed on day of discharge as well as discharge summary to be completed on the day of discharge. RN to RN will happen on the day of discharge as well, unless they want it sooner. This writer left the phone number to call to this writer.     Preliminary orders that are unsigned were sent to [facility]:   Phone: [phone] # 4     Fax: [phone]:       Sasha Wong RN  Float RN Care Coordinator  Pager [phone] (unit RNCC pager)     To get in touch with the Weekend & Holiday on call RN Care Coordinator:  Pager:  [phone] OR Care Coordinator job code/pager [phone]

## 2021-06-26 VITALS
OXYGEN SATURATION: 92 % | HEART RATE: 79 BPM | DIASTOLIC BLOOD PRESSURE: 76 MMHG | WEIGHT: 136.5 LBS | RESPIRATION RATE: 18 BRPM | TEMPERATURE: 98.1 F | SYSTOLIC BLOOD PRESSURE: 134 MMHG | HEIGHT: 62 IN | BODY MASS INDEX: 25.12 KG/M2

## 2021-06-26 LAB
ANION GAP SERPL CALCULATED.3IONS-SCNC: 4 MMOL/L (ref 3–14)
BUN SERPL-MCNC: 19 MG/DL (ref 7–30)
CALCIUM SERPL-MCNC: 8.4 MG/DL (ref 8.5–10.1)
CHLORIDE SERPL-SCNC: 105 MMOL/L (ref 94–109)
CO2 SERPL-SCNC: 32 MMOL/L (ref 20–32)
CREAT SERPL-MCNC: 1.27 MG/DL (ref 0.52–1.04)
GFR SERPL CREATININE-BSD FRML MDRD: 38 ML/MIN/{1.73_M2}
GLUCOSE SERPL-MCNC: 105 MG/DL (ref 70–99)
HGB BLD-MCNC: 8 G/DL (ref 11.7–15.7)
INR PPP: 1.43 (ref 0.86–1.14)
MAGNESIUM SERPL-MCNC: 2.1 MG/DL (ref 1.6–2.3)
PHOSPHATE SERPL-MCNC: 3.7 MG/DL (ref 2.5–4.5)
POTASSIUM SERPL-SCNC: 3.9 MMOL/L (ref 3.4–5.3)
SODIUM SERPL-SCNC: 141 MMOL/L (ref 133–144)

## 2021-06-26 PROCEDURE — 250N000013 HC RX MED GY IP 250 OP 250 PS 637: Performed by: STUDENT IN AN ORGANIZED HEALTH CARE EDUCATION/TRAINING PROGRAM

## 2021-06-26 PROCEDURE — 85018 HEMOGLOBIN: CPT | Performed by: SURGERY

## 2021-06-26 PROCEDURE — 83735 ASSAY OF MAGNESIUM: CPT | Performed by: SURGERY

## 2021-06-26 PROCEDURE — 250N000013 HC RX MED GY IP 250 OP 250 PS 637: Performed by: NURSE PRACTITIONER

## 2021-06-26 PROCEDURE — 250N000013 HC RX MED GY IP 250 OP 250 PS 637: Performed by: SURGERY

## 2021-06-26 PROCEDURE — 36415 COLL VENOUS BLD VENIPUNCTURE: CPT | Performed by: SURGERY

## 2021-06-26 PROCEDURE — 250N000011 HC RX IP 250 OP 636: Performed by: STUDENT IN AN ORGANIZED HEALTH CARE EDUCATION/TRAINING PROGRAM

## 2021-06-26 PROCEDURE — 80048 BASIC METABOLIC PNL TOTAL CA: CPT | Performed by: SURGERY

## 2021-06-26 PROCEDURE — 85610 PROTHROMBIN TIME: CPT | Performed by: SURGERY

## 2021-06-26 PROCEDURE — 84100 ASSAY OF PHOSPHORUS: CPT | Performed by: SURGERY

## 2021-06-26 RX ORDER — WARFARIN SODIUM 2.5 MG/1
TABLET ORAL
Qty: 50 TABLET | Refills: 0 | Status: SHIPPED | OUTPATIENT
Start: 2021-06-26

## 2021-06-26 RX ORDER — METHOCARBAMOL 500 MG/1
500 TABLET, FILM COATED ORAL 3 TIMES DAILY PRN
Qty: 30 TABLET | Refills: 0 | Status: SHIPPED | OUTPATIENT
Start: 2021-06-26

## 2021-06-26 RX ORDER — ASPIRIN 81 MG/1
81 TABLET, CHEWABLE ORAL DAILY
Qty: 120 TABLET | Refills: 0 | Status: SHIPPED | OUTPATIENT
Start: 2021-06-27

## 2021-06-26 RX ORDER — LOSARTAN POTASSIUM 25 MG/1
25 TABLET ORAL AT BEDTIME
Qty: 60 TABLET | Refills: 0 | Status: SHIPPED | OUTPATIENT
Start: 2021-06-26

## 2021-06-26 RX ORDER — WARFARIN SODIUM 5 MG/1
5 TABLET ORAL
Status: DISCONTINUED | OUTPATIENT
Start: 2021-06-26 | End: 2021-06-26 | Stop reason: HOSPADM

## 2021-06-26 RX ORDER — METOPROLOL TARTRATE 75 MG/1
75 TABLET, FILM COATED ORAL 2 TIMES DAILY
Qty: 90 TABLET | Refills: 0 | Status: SHIPPED | OUTPATIENT
Start: 2021-06-26

## 2021-06-26 RX ORDER — AMOXICILLIN 250 MG
1 CAPSULE ORAL 2 TIMES DAILY PRN
Qty: 40 TABLET | Refills: 0 | Status: SHIPPED | OUTPATIENT
Start: 2021-06-26

## 2021-06-26 RX ORDER — ASPIRIN 81 MG/1
81 TABLET, CHEWABLE ORAL DAILY
Status: DISCONTINUED | OUTPATIENT
Start: 2021-06-27 | End: 2021-06-26 | Stop reason: HOSPADM

## 2021-06-26 RX ADMIN — PAROXETINE HYDROCHLORIDE 10 MG: 10 TABLET, FILM COATED ORAL at 07:54

## 2021-06-26 RX ADMIN — METOPROLOL TARTRATE 75 MG: 50 TABLET, FILM COATED ORAL at 07:53

## 2021-06-26 RX ADMIN — Medication 1 TABLET: at 07:53

## 2021-06-26 RX ADMIN — HEPARIN SODIUM 5000 UNITS: 5000 INJECTION, SOLUTION INTRAVENOUS; SUBCUTANEOUS at 07:54

## 2021-06-26 RX ADMIN — FEXOFENADINE HYDROCHLORIDE 180 MG: 180 TABLET, FILM COATED ORAL at 07:53

## 2021-06-26 RX ADMIN — ACETAMINOPHEN 650 MG: 325 TABLET, FILM COATED ORAL at 05:06

## 2021-06-26 RX ADMIN — PANTOPRAZOLE SODIUM 40 MG: 40 TABLET, DELAYED RELEASE ORAL at 07:53

## 2021-06-26 RX ADMIN — ASPIRIN 81 MG CHEWABLE TABLET 81 MG: 81 TABLET CHEWABLE at 07:53

## 2021-06-26 ASSESSMENT — ACTIVITIES OF DAILY LIVING (ADL)
ADLS_ACUITY_SCORE: 18

## 2021-06-26 ASSESSMENT — MIFFLIN-ST. JEOR: SCORE: 1012.41

## 2021-06-26 NOTE — PROGRESS NOTES
"/68 (BP Location: Right arm)   Pulse 80   Temp 97.8  F (36.6  C) (Oral)   Resp 18   Ht 1.575 m (5' 2\")   Wt 61.9 kg (136 lb 8 oz)   SpO2 97%   BMI 24.97 kg/m      D: Patient is s/p Ascending Aortic Aneurysm repair 6/15/2015 c/b PFO.  Hx of lymphoma, NICM, AVR 2015, HLD, CKD, MIGUELANGEL, GERD, GIB, Subdural hematoma, lung CA s/p lobectomy 2017, temporal arteritis.  I/A: Patient is alert and oriented X4, has mid-sternal incision that is open to air and w/o drainage and on 1L of oxygen and sats mid 90s.  Patient C/O L-back pain, site of thoracentesis and patient took PRN tylenol and robaxin with relief.  Up with SBA and going to bathroom to void.    P: Will continue to monitor and notify MD of status.    "

## 2021-06-26 NOTE — DISCHARGE INSTRUCTIONS
________________________________________________________  Discharge RN please fax discharge orders to home care agency   [facility]  [phone] # 4   Fax: [phone]:   They also need an RN-RN call with report on the day of discharge please  ________________________________________________________      AFTER YOU GO HOME FROM YOUR HEART SURGERY  Ascending aorta replacement on 6/15/21 with Dr Gokul Smith    You had a sternotomy, avoid lifting anything greater than ten pounds for 6 weeks after surgery and then less than 20 pounds for an additional 6 weeks. Do not reach backwards or use arms to push out of chair. Do not let people pull on your arms to assist with standing. Avoid twisting or reaching too far across your body.  Avoid strenuous activities such as bowling, vacuuming, raking, shoveling, golf or tennis for 12 weeks after your surgery. It is okay to resume sex if you feel comfortable in doing so. You may have to try different positions with your partner.  Splint your chest incision by hugging a pillow or bringing your arms across your chest when coughing or sneezing. Please try to sleep on your back for the first 4-6 weeks to avoid extra stress on your sternum (breastbone) while it is healing.     No driving for 4 weeks after surgery or while on pain medication.    Shower or wash your incisions daily with soap and water (or as instructed), pat dry. Keep wound clean and dry, showers are okay after discharge, but don't let spray hit directly on incision. No baths or swimming for 1 month. Cover chest tube sites with gauze until they stop draining, then leave open to air. It is not abnormal for chest tube sites to drain yellowish/clear fluid for up to 2-3 weeks after surgery.   Watch for signs of infection: increased redness, tenderness, warmth or any drainage from sternum incision.  Also a temperature > 100.5 F or chills. Call your surgeon or primary care provider's office immediately. Remove any skin glue  left on incisions after 10-14 days. This will not affect your incision and can speed up healing.    Exercise is very important in your recovery. Please follow the guidelines set up for you in your cardiac rehab classes at the hospital. If outpatient cardiac rehab was ordered for you, we highly recommend you participate. If you have problems arranging your cardiac rehab, please call [phone] for all locations, with the exception of [place], please call [phone] and [place], please call [phone].    Avoid sitting for prolonged periods of time, try to walk every hour during the day. If you have a leg incision, elevate your leg often when you are not walking.    Check your weight when you get home from the hospital and continue to check it daily through your recovery for at least a month. If you notice a weight gain of 2-3 pounds in a week, notify your primary care physician, cardiologist or surgeon.    Bowel activity may be slow after surgery. If necessary, you may take an over the counter laxative such as Milk of Magnesia or Miralax. You may have stool softeners prescribed (docusate sodium, Senokot). We recommend using stool softeners while using narcotics for pain (oxycodone/percocet, hydrocodone/vicodin, hydromorphone/dilaudid).      Wean OFF of narcotics (oxycodone, dilaudid, hydrocodone) as soon as possible. You should continue taking acetaminophen as long as you have any surgical pain as the first choice for pain control and add narcotics as necessary for pain to be tolerable.      DENTAL VISITS AFTER SURGERY  You have a history of your heart valve replaced, we do not recommend having any dental work done for 6 months and you will need to take an antibiotic prior to dental visits, as before.  Please notify your dentist before any procedure for the proper treatment needed. The antibiotic is taken by mouth one hour prior to visit. This includes routine cleanings.    DO NOT SMOKE.  IF YOU NEED HELP  QUITTING, PLEASE TALK WITH YOUR CARDIOLOGIST OR PRIMARY DOCTOR.    You are on a blood thinner for atrial fibrillation, follow the instructions you were given in the hospital and DO NOT SKIP this medication. Try and take it the same time everyday. Your primary care physician or coumadin clinic will manage the dosing. INR goal is 2-3 as before surgery.    REGARDING PRESCRIPTION REFILLS.  If you need a refill on your pain medication contact us to discuss your pain and a possible one time refill.   All other medications will be adjusted, discontinued and re-filled by your primary care physician and/or your cardiologist as they were prior to your surgery. We have given you enough for one to three month with possibly one refill.    POST-OPERATIVE CLINIC VISITS  You will now return to the care of your primary provider and your cardiologist. Future medication refills should come from your PCP or Cardiologist.   You need an INR lab draw on Monday 6/28/21.   You should see your primary care provider in 1-2 weeks after discharge.  It is important to see your cardiologist (Dr Kadi Good) in July as scheduled.    If you do not hear from a  in 7 days, please call [phone] (choose option 1) and request to be seen with a general cardiologist or someone that you have seen in the past.   If there is a need to return to see CT Surgery please call our  at [phone].    SURGICAL QUESTIONS  Please call Staci Quintanilla or Dina Tran with surgical recovery and medication questions, their phone numbers are listed below.  They will assist you with your needs and contact other surgery care team members as indicated.    On weekends or after hours, please call [phone] and ask the  to   page the Cardiothoracic Surgery fellow on call.      Thank you,    Your Cardiothoracic Surgery Team  Staci Quintanilla RN Care Coordinator-  [phone]   Dina Tran RN Care Coordinator-  [phone]

## 2021-06-26 NOTE — PLAN OF CARE
Occupational Therapy Discharge Summary    Reason for therapy discharge:    Discharged to home with home therapy.    Progress towards therapy goal(s). See goals on Care Plan in [facility] electronic health record for goal details.  Goals partially met.  Barriers to achieving goals:   discharge from facility. Pt met ADL goals, completing w/ SBA. Pt still limited by activity tolerance.     Therapy recommendation(s):    Continued therapy is recommended.  Rationale/Recommendations:  To progress activity tolerance and promote ADL independence and safety, within precautions, in the home environment..

## 2021-06-27 NOTE — OP NOTE
Procedure Date: 06/15/2021    PREOPERATIVE DIAGNOSES:  1.  Ascending aortic aneurysm.  2.  Aortic insufficiency, status post aortic valve replacement.  3.  Chronic renal insufficiency.    POSTOPERATIVE DIAGNOSES:  1.  Ascending aortic aneurysm.  2.  Aortic insufficiency, status post aortic valve replacement.  3.  Chronic renal insufficiency.    PROCEDURE:  1.  Redo median sternotomy.  2.  Repair of ascending aortic aneurysm with a 32 mm Hemashield graft under deep hypothermic circulatory arrest.  3.  Coronary artery bypass support via the right femoral artery and vein cannulation.  4.  Primary repair of right femoral artery and primary repair of right femoral vein.    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    FIRST ASSISTANT:  Elenita Rodriguez MD     INDICATION:  The patient is an 86-year-old woman with a large pseudoaneurysm of the ascending aorta that was compressing the superior vena cava and the distal right pulmonary artery as well as the right atrium.  She had this aneurysm occur after her prior aortic valve replacement surgery. Followup showed increase in size of the aneurysm.  Despite the patient's frailty, she is in otherwise good condition and understood the risks and benefits of the procedure and wished to proceed.    OPERATIVE FINDINGS:  The patient had moderate adhesions present in the mediastinum with an extensive large ascending aortic pseudoaneurysm that was compressing the right atrium, right pulmonary artery and superior vena cava and distorting the anatomic structures.  There was also significant calcification of the ascending aorta, particularly at the takeoff of  the innominate artery and in the aortic arch.  The bioprosthetic aortic valve was grossly normal and was functioning well.    METHOD:  The patient was brought to the operating room and placed in a supine position after appropriate site marking and consent were obtained.  The patient had general anesthesia induced with endotracheal intubation performed without difficulty.  The patient's chest, abdomen and bilateral lower extremities were prepped and draped in normal sterile fashion.  We began with a right inguinal incision to expose the right femoral artery and vein with pursestring sutures placed.  Heparin was administered and ACT was greater than 450 prior to cannulation.  A 17-[place] arterial cannula was inserted in the right femoral artery and a 25-[place] venous cannula was inserted into the right femoral vein.  We instituted full cardiopulmonary bypass and began cooling.    A redo sternotomy was then performed through the prior incision with all wires removed.  The sternum was divided using an oscillating saw and careful dissection of the adhesions proceeded.  We were able to free up the sternum and place a Noland retractor.  We then carefully isolated the ascending aorta distal to the area of the pseudoaneurysm and above the pulmonary artery.  We encircled the aorta and also freed up the right atrium.  We placed a right retrograde cardioplegic cannula and placed a vent into the right pulmonary artery.  We dissected around the distal ascending aorta and when the heart fibrillated we placed an aortic crossclamp and administered retrograde cold blood cardioplegia for myocardial protection.  Myocardial protection was continued throughout the procedure in a near continuous fashion using retrograde cardioplegia. Myocardial temperature was monitored.  We also used topical cold saline for epicardial cooling.  We had reached 24 degrees Celsius systemically and administered approximately 2 liters of cold blood  cardioplegia.  We then opened the mid portion of the ascending aorta and excised it and delivered handheld antegrade cardioplegia into the right and left coronary ostia.  We excised the distal ascending aorta up to the clamp and it is noted there was significant calcification around the clamp as well as dissection of the aorta at the level of the clamp.  We decided circulatory arrest was necessary at this point.  We initiated circulatory arrest with draining the heart.  The clamp was removed and the circulatory arrest started.  We gave antegrade cerebral blood through the cannula placed into the ostia of the innominate artery with stable cerebral oximetry.  The patient had bovine pericardial strips fashioned and placed around the inside and outside of the distal aortic suture margin, which was flushed with the innominate artery takeoff.  A 32 mm Hemashield graft was sewn in end-to-end fashion using a running 4-0 Prolene suture.  Once complete, circulation was resumed and the graft deaired.  We then confirmed hemostasis by reinstituting circulation and clamped the aortic graft after we had deaired the graft completely.  We checked for hemostasis of the entire anastomosis and once this was confirmed, the clamp was repositioned proximal to the anastomosis and rewarming was begun.  We then turned our attention to the proximal aortic margin and this had to be taken down proximal to the previous aortotomy suture line as this is where the pseudoaneurysm involving the posterior wall of the aorta had begun.  We carefully dissected off the roof of the left atrium as well as off the right atrium.  Once this was completed, we again placed a strip of bovine pericardium around the proximal aortic margin and sewed the graft in an end-to-end fashion to the proximal aortic margin with a continuous 4-0 Prolene suture.  We then placed a root vent in the graft and delivered a dose of hot shot cardioplegia while de-airing the heart.   Once the heart was completely deaired and the hot shot delivered, the crossclamp was removed.  We inspected all sites for hemostasis and the pulmonary artery vent and the aortic root vent were continued.  We removed the retrograde cardioplegia line and oversewed the site of the right atrium.  An organized rhythm resumed without need for defibrillation. Rewarming and reperfusion were continued.  During the period of rewarming, we did notice bleeding from the roof of the left atrium, which was closed with interrupted 4-0 Prolene pledgeted sutures.  The patient then had the pacing leads placed and ventilation was resumed.  She had calcium administered and was weaned off cardiopulmonary bypass with low dose epinephrine as her left ventricular function was within normal limits.  The cannulas from the groin were all removed prior to administration of the protamine sulfate, which reversed the heparin.  The right femoral artery and vein were primarily repaired.  On ABEL, there was normal EF and normal function of the prosthetic valve with a mild valvular leak that was known previously.  We gave several units of fresh frozen plasma, platelets and Kcentra to facilitate hemostasis, which was achieved.  Mediastinal chest tubes were placed and the left pleural chest tube was positioned.  The hemostasis was confirmed.  Sternum was closed with surgical wires.  We closed the linea alba, subcuticular and skin in layers of absorbable suture.   Hemostasis was obtained at the groin wound, which was closed in multiple layers of absorbable sutures and sterile dressing applied to both incisions.  The sponge, needle and instrument count was correct at the end of the case.     I was present for the entire operation due to the complexity of the operation and the need for a second surgeon throughout the operation.  The patient was taken to the surgical intensive care unit in serious but stable condition.      Magnolia Jordan MD        D:  2021   T: 2021   MT: CHSMT    Name:     ABRAM BALDERAS  MRN:      [id number]        Account:        [id number]   :      1935           Procedure Date: 06/15/2021     Document: [id number]

## 2021-06-28 ENCOUNTER — PATIENT OUTREACH (OUTPATIENT)
Dept: CARE COORDINATION | Facility: CLINIC | Age: 86
End: 2021-06-28

## 2021-06-28 ENCOUNTER — CARE COORDINATION (OUTPATIENT)
Dept: CARDIOLOGY | Facility: CLINIC | Age: 86
End: 2021-06-28

## 2021-06-28 DIAGNOSIS — Z71.89 OTHER SPECIFIED COUNSELING: ICD-10-CM

## 2021-06-28 NOTE — PROGRESS NOTES
[facility]: Post-Discharge Note  SITUATION                                                      Admission:    Admission Date: 06/15/21   Reason for Admission: (Ascending aortic aneurysm )  Discharge:   Discharge Date: 06/26/21  Discharge Diagnosis: ( ascending aortic aneurysm repair )  Discharge Plan: (Follow up)    BACKGROUND                                                    On 6/15/21, Racquel Emmanuel underwent successful a redo median sternotomy, repair of ascending aortic aneurysm with 32 mm Hemashield platinum graft under deep hypothermic circulatory arrest, and peripheral cardiopulmonary bypass via right femoral artery and vein cannulation by Dr. Gokul Smith.     Intraoperative findings included an extensive large ascending aortic aneurysm.  There was also significant calcification in the ascending aorta.  The aortic valve looked grossly normal.      Postoperatively, patient was taken to the intensive care unit for recovery in stable condition. Patient was extubated within protocol on POD#2.  Blood pressure and cardiac index were managed with vasopressors and inotropic agents which were continuously weaned until no longer needed.  Patient did develop thrombocytopenia POD#2, negative HIT screen and subsequently recovered by discharge. Patient was subsequently transferred to the surgical telemetry floor.     While on the surgical unit, the patient continued to progress well. Chest tubes and temporary pacemaker wires were removed when deemed appropriate.     Patient was fluid overloaded and treated with diuretics. She developed a left pleural effusion causing a need for oxygen during the day requiring a thoracentesis for which 450 mL of dark yellow fluid was drained. She tolerated the procedure well. She remains up about 4 kg from preoperative weight (trending down) and discharged with diuretic therapy; will re-evaluate need in clinic follow-up.      Patient was transiently hyperglycemic and treated  "with insulin infusion then transitioned to sliding scale insulin per protocol. Blood sugars remained stable. No further glycemic control agents needed at this time.     She was started on coumadin for post-op atrial fibrillation and needs her INR checked 6/28/21 for atrial fibrillation, goal INR 2-3.      On day of discharge, patient's pain was well controlled, was working well with therapies and stable for discharge to home on 6/26/21. She has coronary artery disease and will discharge on ASA, BB, statin. Follow up with cardiology and cardiac surgery have been arranged. Pt encouraged to follow up with PCP and cardiac rehab upon discharge.      ASSESSMENT      Discharge Assessment  Patient reports symptoms are: Improved  Does the patient have all of their medications?: Yes  Does patient know what their new medications are for?: Yes  Does patient have a follow-up appointment scheduled?: Yes  Does patient have any other questions or concerns?: No( \" INR was 2 today \" )    Post-op  Did the patient have surgery or a procedure: Yes  Incision: healing  Drainage: No  Bleeding: none  Fever: No  Chills: No  Redness: No  Warmth: No  Swelling: No  Incision site pain: No  Closure: other  Eating & Drinking: eating and drinking without complaints/concerns  PO Intake: regular diet  Bowel Function: normal  Date of last BM: 06/27/21  Urinary Status: voiding without complaint/concerns        PLAN                                                      Outpatient Plan: You had a sternotomy, avoid lifting anything greater than ten pounds for 6 weeks after surgery and then less than 20 pounds for an additional 6 weeks. Do not reach backwards or use arms to push out of chair. Do not let people pull on your arms to assist with standing. Avoid twisting or reaching too far across your body.  Avoid strenuous activities such as bowling, vacuuming, raking, shoveling, golf or tennis for 12 weeks after your surgery. It is okay to resume sex if you " feel comfortable in doing so. You may have to try different positions with your partner.  Splint your chest incision by hugging a pillow or bringing your arms across your chest when coughing or sneezing. Please try to sleep on your back for the first 4-6 weeks to avoid extra stress on your sternum (breastbone) while it is healing.      No driving for 4 weeks after surgery or while on pain medication.     Shower or wash your incisions daily with soap and water (or as instructed), pat dry. Keep wound clean and dry, showers are okay after discharge, but don't let spray hit directly on incision. No baths or swimming for 1 month. Cover chest tube sites with gauze until they stop draining, then leave open to air. It is not abnormal for chest tube sites to drain yellowish/clear fluid for up to 2-3 weeks after surgery.   Watch for signs of infection: increased redness, tenderness, warmth or any drainage from sternum incision.  Also a temperature > 100.5 F or chills. Call your surgeon or primary care provider's office immediately. Remove any skin glue left on incisions after 10-14 days. This will not affect your incision and can speed up healing.     Exercise is very important in your recovery. Please follow the guidelines set up for you in your cardiac rehab classes at the hospital. If outpatient cardiac rehab was ordered for you, we highly recommend you participate. If you have problems arranging your cardiac rehab, please call [phone] for all locations, with the exception of [place], please call [phone] and [place], please call [phone].     No future appointments.        Arlette Campbell MA

## 2021-06-28 NOTE — PLAN OF CARE
Physical Therapy Discharge Summary    Reason for therapy discharge:    Discharged to home with home therapy.    Progress towards therapy goal(s). See goals on Care Plan in [facility] electronic health record for goal details.  Goals partially met.  Barriers to achieving goals:   discharge from facility.    Therapy recommendation(s):    Continued therapy is recommended.  Rationale/Recommendations:  increase safety and independence with functional mobility.

## 2021-06-28 NOTE — PROGRESS NOTES
HOW ARE YOU DOING  Tell me how you have been doing since you were discharged from the hospital?  Patient states she is doing very well, but she is extremely shortness of breath with minimal activity.  She states her oxygen sats go down to 83% when she is up moving around and rebound very quickly to 89-90% when she sits.     ACTIVITY  How is your activity tolerance? Poor due to shortness of breath.     POST OP MONITORING  How is your pain on a 0-10 scale, how are you managing your pain? No pain, only a little at the site of her thoracentesis.     Are you still doing sternal precautions? Yes.         Are you weighing yourself daily?  Yes, weight is down 4 pounds from discharge, yesterdays weight was 135 pounds.     Are you using the inspirometer? Yes.       SIGNS AND SYMPTOMS OF INFECTION  1. INCREASE IN PAIN no  2. FEVER no  3. DRAINAGE no    If Yes, color:                 5. REDNESS no    6. SWELLING no    ASSISTANCE  Do you have someone at home to assist you with your daily activities?  Spouse and daughter.       MEDICATIONS  Is someone helping you to set up your medications?  Daughter.   Do you have any questions about your medications?  no     Are you on a blood thinner?  yes  Who is managing your INRs?  Warfarin, INR today from PCP was 2.0     FOLLOW UP  Are you scheduled for cardiac rehab? They are in the process of setting this up.       You are scheduled to see our surgery advanced practice provider for post operative follow up on N/A patient lives in [place].    You are scheduled to see your cardiologist on, this is scheduled but patient can't remember the date at this time.   You are scheduled to see your primary care physician on today 06/28.       CONTACT INFORMATION  Please feel free to call us with any other questions or symptoms that are concerning for you at [phone], if it is after 4:30 in the afternoon, or a weekend please call [phone] and ask for the on call specialist.  We want to do  everything we can to help prevent you needing to return to the ED, so please do not hesitate to call us.

## 2021-06-28 NOTE — PROGRESS NOTES
[facility] called. They did not receive AVS or discharge summary. Faxed it to [phone]

## 2021-06-29 DIAGNOSIS — Z95.2 S/P AVR (AORTIC VALVE REPLACEMENT): Primary | ICD-10-CM

## 2021-06-29 LAB — COPATH REPORT: NORMAL

## 2021-07-02 ENCOUNTER — TRANSFERRED RECORDS (OUTPATIENT)
Dept: HEALTH INFORMATION MANAGEMENT | Facility: CLINIC | Age: 86
End: 2021-07-02

## 2021-07-06 ENCOUNTER — APPOINTMENT (OUTPATIENT)
Dept: GENERAL RADIOLOGY | Facility: CLINIC | Age: 86
End: 2021-07-06
Attending: EMERGENCY MEDICINE
Payer: MEDICARE

## 2021-07-06 ENCOUNTER — HOSPITAL ENCOUNTER (EMERGENCY)
Facility: CLINIC | Age: 86
Discharge: HOME | End: 2021-07-06
Attending: FAMILY MEDICINE | Admitting: FAMILY MEDICINE
Payer: MEDICARE

## 2021-07-06 ENCOUNTER — APPOINTMENT (OUTPATIENT)
Dept: ULTRASOUND IMAGING | Facility: CLINIC | Age: 86
End: 2021-07-06
Attending: NURSE PRACTITIONER
Payer: MEDICARE

## 2021-07-06 ENCOUNTER — APPOINTMENT (OUTPATIENT)
Dept: CARDIOLOGY | Facility: CLINIC | Age: 86
End: 2021-07-06
Attending: FAMILY MEDICINE
Payer: MEDICARE

## 2021-07-06 ENCOUNTER — CARE COORDINATION (OUTPATIENT)
Dept: CARDIOLOGY | Facility: CLINIC | Age: 86
End: 2021-07-06

## 2021-07-06 VITALS
OXYGEN SATURATION: 93 % | HEART RATE: 73 BPM | WEIGHT: 136 LBS | DIASTOLIC BLOOD PRESSURE: 58 MMHG | SYSTOLIC BLOOD PRESSURE: 135 MMHG | TEMPERATURE: 97.5 F | RESPIRATION RATE: 16 BRPM | HEIGHT: 62 IN | BODY MASS INDEX: 25.03 KG/M2

## 2021-07-06 DIAGNOSIS — M89.8X1 PAIN OF LEFT SCAPULA: ICD-10-CM

## 2021-07-06 DIAGNOSIS — Z86.79 S/P ASCENDING AORTIC ANEURYSM REPAIR: ICD-10-CM

## 2021-07-06 DIAGNOSIS — Z98.890 S/P ASCENDING AORTIC ANEURYSM REPAIR: ICD-10-CM

## 2021-07-06 DIAGNOSIS — J90 RECURRENT LEFT PLEURAL EFFUSION: ICD-10-CM

## 2021-07-06 DIAGNOSIS — Z98.890 POSTOPERATIVE STATE: ICD-10-CM

## 2021-07-06 LAB
ANION GAP SERPL CALCULATED.3IONS-SCNC: 3 MMOL/L (ref 3–14)
BASOPHILS # BLD AUTO: 0 10E9/L (ref 0–0.2)
BASOPHILS NFR BLD AUTO: 0.3 %
BUN SERPL-MCNC: 42 MG/DL (ref 7–30)
CALCIUM SERPL-MCNC: 9 MG/DL (ref 8.5–10.1)
CHLORIDE SERPL-SCNC: 105 MMOL/L (ref 94–109)
CO2 SERPL-SCNC: 33 MMOL/L (ref 20–32)
CREAT SERPL-MCNC: 1.61 MG/DL (ref 0.52–1.04)
DIFFERENTIAL METHOD BLD: ABNORMAL
EOSINOPHIL # BLD AUTO: 0.3 10E9/L (ref 0–0.7)
EOSINOPHIL NFR BLD AUTO: 3.5 %
ERYTHROCYTE [DISTWIDTH] IN BLOOD BY AUTOMATED COUNT: 16.5 % (ref 10–15)
GFR SERPL CREATININE-BSD FRML MDRD: 29 ML/MIN/{1.73_M2}
GLUCOSE SERPL-MCNC: 99 MG/DL (ref 70–99)
HCT VFR BLD AUTO: 29 % (ref 35–47)
HGB BLD-MCNC: 9.2 G/DL (ref 11.7–15.7)
IMM GRANULOCYTES # BLD: 0 10E9/L (ref 0–0.4)
IMM GRANULOCYTES NFR BLD: 0.1 %
INR PPP: 2.1 (ref 0.86–1.14)
INTERPRETATION ECG - MUSE: NORMAL
LYMPHOCYTES # BLD AUTO: 1.5 10E9/L (ref 0.8–5.3)
LYMPHOCYTES NFR BLD AUTO: 20.7 %
MCH RBC QN AUTO: 29.9 PG (ref 26.5–33)
MCHC RBC AUTO-ENTMCNC: 31.7 G/DL (ref 31.5–36.5)
MCV RBC AUTO: 94 FL (ref 78–100)
MONOCYTES # BLD AUTO: 1.1 10E9/L (ref 0–1.3)
MONOCYTES NFR BLD AUTO: 15.2 %
NEUTROPHILS # BLD AUTO: 4.3 10E9/L (ref 1.6–8.3)
NEUTROPHILS NFR BLD AUTO: 60.2 %
NRBC # BLD AUTO: 0 10*3/UL
NRBC BLD AUTO-RTO: 0 /100
NT-PROBNP SERPL-MCNC: 2957 PG/ML (ref 0–1800)
PLATELET # BLD AUTO: 259 10E9/L (ref 150–450)
POTASSIUM SERPL-SCNC: 4.1 MMOL/L (ref 3.4–5.3)
PROCALCITONIN SERPL-MCNC: 0.06 NG/ML
RBC # BLD AUTO: 3.08 10E12/L (ref 3.8–5.2)
SODIUM SERPL-SCNC: 140 MMOL/L (ref 133–144)
TROPONIN I SERPL-MCNC: <0.015 UG/L (ref 0–0.04)
WBC # BLD AUTO: 7.1 10E9/L (ref 4–11)

## 2021-07-06 PROCEDURE — 99285 EMERGENCY DEPT VISIT HI MDM: CPT | Mod: 25 | Performed by: FAMILY MEDICINE

## 2021-07-06 PROCEDURE — 76604 US EXAM CHEST: CPT | Mod: XU

## 2021-07-06 PROCEDURE — 83880 ASSAY OF NATRIURETIC PEPTIDE: CPT | Performed by: EMERGENCY MEDICINE

## 2021-07-06 PROCEDURE — 84484 ASSAY OF TROPONIN QUANT: CPT | Performed by: EMERGENCY MEDICINE

## 2021-07-06 PROCEDURE — 93306 TTE W/DOPPLER COMPLETE: CPT

## 2021-07-06 PROCEDURE — 71046 X-RAY EXAM CHEST 2 VIEWS: CPT

## 2021-07-06 PROCEDURE — 85025 COMPLETE CBC W/AUTO DIFF WBC: CPT | Performed by: EMERGENCY MEDICINE

## 2021-07-06 PROCEDURE — 71046 X-RAY EXAM CHEST 2 VIEWS: CPT | Mod: 26 | Performed by: RADIOLOGY

## 2021-07-06 PROCEDURE — 99285 EMERGENCY DEPT VISIT HI MDM: CPT | Mod: 25

## 2021-07-06 PROCEDURE — 93306 TTE W/DOPPLER COMPLETE: CPT | Mod: 26 | Performed by: INTERNAL MEDICINE

## 2021-07-06 PROCEDURE — 85610 PROTHROMBIN TIME: CPT | Performed by: EMERGENCY MEDICINE

## 2021-07-06 PROCEDURE — 93005 ELECTROCARDIOGRAM TRACING: CPT

## 2021-07-06 PROCEDURE — 93010 ELECTROCARDIOGRAM REPORT: CPT | Performed by: FAMILY MEDICINE

## 2021-07-06 PROCEDURE — 80048 BASIC METABOLIC PNL TOTAL CA: CPT | Performed by: EMERGENCY MEDICINE

## 2021-07-06 PROCEDURE — 84145 PROCALCITONIN (PCT): CPT | Performed by: EMERGENCY MEDICINE

## 2021-07-06 PROCEDURE — 76604 US EXAM CHEST: CPT | Mod: 26 | Performed by: RADIOLOGY

## 2021-07-06 RX ORDER — METHOCARBAMOL 500 MG/1
500 TABLET, FILM COATED ORAL 3 TIMES DAILY PRN
Qty: 10 TABLET | Refills: 0 | Status: SHIPPED | OUTPATIENT
Start: 2021-07-06

## 2021-07-06 ASSESSMENT — ENCOUNTER SYMPTOMS
BRUISES/BLEEDS EASILY: 1
FLANK PAIN: 0
SINUS PRESSURE: 0
COUGH: 0
WEAKNESS: 0
ACTIVITY CHANGE: 1
SHORTNESS OF BREATH: 1
JOINT SWELLING: 0
COLOR CHANGE: 0
HEADACHES: 0
FATIGUE: 0
DECREASED CONCENTRATION: 0
APPETITE CHANGE: 0
CONFUSION: 0
VOMITING: 0
LIGHT-HEADEDNESS: 0
WHEEZING: 0
ABDOMINAL PAIN: 0
CHILLS: 0
VOICE CHANGE: 0
NECK PAIN: 0
FEVER: 0
NAUSEA: 0
DYSPHORIC MOOD: 0
BACK PAIN: 1

## 2021-07-06 ASSESSMENT — MIFFLIN-ST. JEOR: SCORE: 1010.14

## 2021-07-06 NOTE — ED PROVIDER NOTES
[place] EMERGENCY DEPARTMENT ([facility])  7/06/21     History     Chief Complaint   Patient presents with     Shortness of Breath     The history is provided by the patient and medical records.     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  She is concerned for possible fluid in her lung.    Patient was recently admitted to the hospital here from 6/15/2021 to 6/26/2021 where she underwent a redone median sternotomy, repair of ascending aortic aneurysm and peripheral cardiopulmonary bypass via the right femoral artery and vein cannulation.  Patient was fluid overloaded and treated with diuretics.  She developed a left pleural effusion because need for oxygen during the day and requiring thoracentesis (06/25) for which 450 mL of dark yellow fluid was drained.     CXR 6/22/21:  1. Small bilateral pleural effusions.  2. Left basilar and retrocardiac opacities, slightly improved from prior.  3. Aortic atherosclerosis.     Echocardiogram:   Interpretation Summary:  S/P AVR with 23mm Trifecta tissue valve on 6/3/2015 at [facility] and S/P  Aortic aneurysm repair with 32mm Hemashield Platinum on 6/16/2021 at [facility].  Global and regional left ventricular function is normal with an EF of 55-60%.  Right ventricular function, chamber size, wall motion, and thickness are normal.  A bioprosthetic aortic valve is present.  The mean gradient across the aortic valve is9 mmHg.  No pericardial  effusion is present.   A left pleural effusion is present.  There is no prior study for direct comparison.    Patient noted that she is having left shoulder blade pain but notes it has been there since she was discharged from the hospital.  Patient notes that she has not needed oxygen that last few days.  Notes that her breathing is slight increased currently but that is normal for her.  No fever or leg pain.  Patient taking coumadin. No chest pain. Patient feels ok. Family brought her here with their concerns.    Past Medical History  Past Medical History:   Diagnosis Date     Anemia 4/5/2021     Antiplatelet or antithrombotic long-term use      Aortic aneurysm (H) 3/24/2021     Aortic insufficiency 3/24/2021     Arthritis      Ascending aortic aneurysm (H)      Clinical diagnosis of COVID-19; 7/2020 4/5/2021     Congestive heart failure (H)      Gastroesophageal reflux disease without esophagitis 4/5/2021     GI bleed; 8/2019 4/5/2021     History of cholecystectomy 4/5/2021     History of lobectomy of lung; 2/13/2017 ([place]) 4/5/2021     History of lung cancer, right middle lobe; 2/2017 4/5/2021     Hyperlipidemia LDL goal <130 4/5/2021     Hypertension      ICD (implantable cardioverter-defibrillator); 2015 4/5/2021     Large B-cell lymphoma 2014 4/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     Macular degeneration 4/5/2021     Nonischemic cardiomyopathy (H) 3/24/2021     MIGUELANGEL (obstructive sleep apnea) 4/5/2021     Osteopenia 4/5/2021     Oxygen dependent     2.5 l via NC at night time only     Pacemaker      Paroxysmal atrial fibrillation (H) 4/5/2021     S/P AVR (22 mm bioprosthetic valve) 3/24/2021     Temporal giant cell arteritis (H) 4/5/2021     Past Surgical History:   Procedure Laterality Date     COLECTOMY PARTIAL       COLONOSCOPY       CV CORONARY ANGIOGRAM N/A 6/3/2021    Procedure: CV CORONARY ANGIOGRAM;  Surgeon: Codey Marks MD;  Location:  [facility]     ENDOSCOPY       LOBECTOMY LUNG       REPAIR  ANEURYSM ASCENDING AORTA N/A 6/15/2021    Procedure: redo sternotomy, ascending aortic aneurysm  repair Size 23mm hemashield graft, Lysis of adhesions, On-pump oxygenation, Circulatory arrest, Trans espohageal echocardiogram;  Surgeon: Gokul Smith MD;  Location: [facility] OR     REPAIR VALVE AORTIC       methocarbamol (ROBAXIN) 500 MG tablet  acetaminophen (TYLENOL) 500 MG tablet  aspirin (ASA) 81 MG chewable tablet  calcitRIOL (ROCALTROL) 0.25 MCG capsule  calcium carbonate (TUMS) 500 MG chewable tablet  digoxin (LANOXIN) 125 MCG tablet  ferrous sulfate (FEROSUL) 325 (65 Fe) MG tablet  fexofenadine (ALLEGRA) 180 MG tablet  losartan (COZAAR) 25 MG tablet  lovastatin (MEVACOR) 40 MG tablet  methocarbamol (ROBAXIN) 500 MG tablet  metoprolol tartrate 75 MG TABS  multivitamin (OCUVITE) TABS tablet  omeprazole (PRILOSEC) 40 MG DR capsule  PARoxetine (PAXIL) 10 MG tablet  potassium chloride ER (K-TAB/KLOR-CON) 10 MEQ CR tablet  senna-docusate (SENOKOT-S/PERICOLACE) 8.6-50 MG tablet  torsemide (DEMADEX) 20 MG tablet  triamcinolone (NASACORT) 55 MCG/ACT nasal aerosol  warfarin ANTICOAGULANT (COUMADIN) 2.5 MG tablet      Allergies   Allergen Reactions     Amoxicillin      Atorvastatin      Codeine      Nsaids      Family History  Family History   Problem Relation Age of Onset     Anesthesia Reaction No family hx of      Deep Vein Thrombosis (DVT) No family hx of      Social History   Social History     Tobacco Use     Smoking status: Former Smoker     Quit date: 1990     Years since quittin.4     Smokeless tobacco: Never Used   Substance Use Topics     Alcohol use: Not Currently     Drug use: Not Currently      Past medical history, past surgical history, medications, allergies, family history, and social history were reviewed with the patient. No additional pertinent items.       Review of Systems   Constitutional: Positive for activity change. Negative for appetite change, chills, fatigue and fever.   HENT: Negative  "for sinus pressure and voice change.    Respiratory: Positive for shortness of breath (typical per patient currently). Negative for cough and wheezing.    Cardiovascular: Negative for chest pain and leg swelling.   Gastrointestinal: Negative for abdominal pain, nausea and vomiting.   Genitourinary: Negative for flank pain and urgency.   Musculoskeletal: Positive for back pain (left scapular). Negative for gait problem, joint swelling and neck pain.   Skin: Negative for color change.   Allergic/Immunologic: Negative for immunocompromised state.   Neurological: Negative for syncope, weakness, light-headedness and headaches.   Hematological: Bruises/bleeds easily (coumadin).   Psychiatric/Behavioral: Negative for confusion, decreased concentration and dysphoric mood.   All other systems reviewed and are negative.    A complete review of systems was performed with pertinent positives and negatives noted in the HPI, and all other systems negative.    Physical Exam   BP: 119/51  Pulse: 68  Temp: 97.6  F (36.4  C)  Resp: 24  Height: 157.5 cm (5' 2\")  Weight: 61.7 kg (136 lb)  SpO2: 96 %  Physical Exam  Vitals signs and nursing note reviewed.   Constitutional:       General: She is in acute distress.      Appearance: She is not ill-appearing, toxic-appearing or diaphoretic.      Comments: Patient alert and oriented mild dyspnea but alert feeling wnl     HENT:      Head: Normocephalic and atraumatic.      Mouth/Throat:      Mouth: Mucous membranes are moist.      Pharynx: No oropharyngeal exudate.   Eyes:      General: No scleral icterus.     Pupils: Pupils are equal, round, and reactive to light.   Neck:      Musculoskeletal: Neck supple. No neck rigidity or muscular tenderness.   Cardiovascular:      Rate and Rhythm: Normal rate and regular rhythm.      Heart sounds: Normal heart sounds.   Pulmonary:      Effort: No respiratory distress.      Breath sounds: Rales (left bibasilar crackles heard) present. No wheezing.      " Comments: Equal fremination both lung sounds  Abdominal:      General: Bowel sounds are normal. There is no distension.      Palpations: Abdomen is soft.      Tenderness: There is no abdominal tenderness.   Musculoskeletal:         General: No swelling, tenderness or deformity.   Skin:     General: Skin is warm.      Capillary Refill: Capillary refill takes less than 2 seconds.      Findings: No rash.   Neurological:      General: No focal deficit present.      Mental Status: She is oriented to person, place, and time. Mental status is at baseline.   Psychiatric:         Mood and Affect: Mood normal.         Behavior: Behavior normal.         Thought Content: Thought content normal.         Judgment: Judgment normal.         ED Course     1:54PM  The patient was seen and examined by Chadd Gloria MD in Room ED05.  Evaluated here in the ER.  EKG done revealing sinus rhythm without any hyperacute changes.  IV established labs drawn.  Cardiovascular surgery did see the patient down in the ER.  The lateral chest done with mild left pleural effusion no pneumothorax no other abnormalities no infiltrate.  As noted EKG did not show any hyperacute changes.  White count 7.1 hemoglobin stable at 9.2.  INR is 2.10.  BNP is 2900.  Troponin negative.  Sodium 140 potassium 4.1.  Bicarb 33.  Glucose 99 creatinine 1.6 BUN is 42.  Procalcitonin was 0.06.    In the ER intervention radiology is been consulted we did come over and do a ultrasound looking at lung volumes.  Only small area of fluid noted not obtainable for paracentesis this point.  Formal echo was done also in the ER.  Reviewed by CV surgery fellow at this point function everything appeared stable without any concerns.  At this point talking the patient more she states that she has been having the symptoms since she was discharged from the hospital.  Being without oxygen last couple days feeling better.  She is comfortable going home discussed with daughter along with   is present also.  They are comfortable also patient to use oxygen as needed did prescribe Robaxin for possible muscle spasm monitoring symptoms follow-up with MD and return if any worsening symptoms at all.  All are comfortable and comfortably discharge.    Procedures             EKG Interpretation:      Interpreted by Chadd Gloria MD  Time reviewed: 1410  Symptoms at time of EKG: left post scapular pain and sob   Rhythm: normal sinus   Rate: normal  Axis: normal  Ectopy: none  Conduction: normal  ST Segments/ T Waves: No hyperacute ST-T wave changes  Q Waves: none  Comparison to prior: no sig change from prev    Clinical Impression: nsr without hyperacute changes             Results for orders placed or performed during the hospital encounter of 07/06/21   XR Chest 2 Views     Status: None    Narrative    EXAM: XR CHEST 2 VW  7/6/2021 2:07 PM     HISTORY:  SOA/effusion       COMPARISON:  Chest x-ray 6/25/2021, 6/22/2021.    FINDINGS:   PA and lateral upright chest x-ray. Postsurgical changes in the chest.  Median sternotomy wires are intact. Left chest wall ICD with leads  projecting over the right atrium and right ventricle.    Trachea is midline. Cardiac silhouette is within normal limits.  Pulmonary vasculature is indistinct. Aortic arch calcifications. Small  left pleural effusion, increased from prior exam also loculated in the  left apex. No pleural effusion on the right. No pneumothorax.  Stable  9 mm subpleural nodule overlying the lateral right lower lobe.    No acute osseous abnormality. Multilevel degenerative changes of the  visualized thoracolumbar spine. Visualized soft tissues and upper  abdomen are unremarkable. Postsurgical cholecystectomy clips in the  right upper quadrant.      Impression    IMPRESSION:   1. Small left pleural effusion, increased when compared to prior exam.  No new focal airspace opacity. Right pleural effusion has resolved.  2. Stable right lower lobe  nodule..    I have personally reviewed the examination and initial interpretation  and I agree with the findings.    MARY NICOLE MD         SYSTEM ID:  [id number]   US Chest Pleural Effusion Imaging     Status: None    Narrative    EXAMINATION: US CHEST/PLEURA 7/6/2021 3:33 PM     COMPARISON: None.    HISTORY: Evaluate for drainable effusion    TECHNIQUE: Gray scale ultrasound of the chest.    FINDINGS:  There is a small amount of anechoic pleural effusion on the left side,  measuring 5.5 x 5.1 X 4.2 cm in the supine position. No effusion on  the right side.      Impression    IMPRESSION:  1.  Smaller left pleural effusion    ROBERT WRIGHT MD         SYSTEM ID:  [id number]   CBC with platelets differential     Status: Abnormal   Result Value Ref Range    WBC 7.1 4.0 - 11.0 10e9/L    RBC Count 3.08 (L) 3.8 - 5.2 10e12/L    Hemoglobin 9.2 (L) 11.7 - 15.7 g/dL    Hematocrit 29.0 (L) 35.0 - 47.0 %    MCV 94 78 - 100 fl    MCH 29.9 26.5 - 33.0 pg    MCHC 31.7 31.5 - 36.5 g/dL    RDW 16.5 (H) 10.0 - 15.0 %    Platelet Count 259 150 - 450 10e9/L    Diff Method Automated Method     % Neutrophils 60.2 %    % Lymphocytes 20.7 %    % Monocytes 15.2 %    % Eosinophils 3.5 %    % Basophils 0.3 %    % Immature Granulocytes 0.1 %    Nucleated RBCs 0 0 /100    Absolute Neutrophil 4.3 1.6 - 8.3 10e9/L    Absolute Lymphocytes 1.5 0.8 - 5.3 10e9/L    Absolute Monocytes 1.1 0.0 - 1.3 10e9/L    Absolute Eosinophils 0.3 0.0 - 0.7 10e9/L    Absolute Basophils 0.0 0.0 - 0.2 10e9/L    Abs Immature Granulocytes 0.0 0 - 0.4 10e9/L    Absolute Nucleated RBC 0.0    INR     Status: Abnormal   Result Value Ref Range    INR 2.10 (H) 0.86 - 1.14   Basic metabolic panel     Status: Abnormal   Result Value Ref Range    Sodium 140 133 - 144 mmol/L    Potassium 4.1 3.4 - 5.3 mmol/L    Chloride 105 94 - 109 mmol/L    Carbon Dioxide 33 (H) 20 - 32 mmol/L    Anion Gap 3 3 - 14 mmol/L    Glucose 99 70 - 99 mg/dL    Urea Nitrogen 42 (H) 7 - 30 mg/dL     Creatinine 1.61 (H) 0.52 - 1.04 mg/dL    GFR Estimate 29 (L) >60 mL/min/[1.73_m2]    GFR Estimate If Black 33 (L) >60 mL/min/[1.73_m2]    Calcium 9.0 8.5 - 10.1 mg/dL   Nt probnp inpatient     Status: Abnormal   Result Value Ref Range    N-Terminal Pro BNP Inpatient 2,957 (H) 0 - 1,800 pg/mL   Troponin I     Status: None   Result Value Ref Range    Troponin I ES <0.015 0.000 - 0.045 ug/L   Procalcitonin     Status: None   Result Value Ref Range    Procalcitonin 0.06 ng/ml   EKG 12 lead     Status: None (Preliminary result)   Result Value Ref Range    Interpretation ECG Click View Image link to view waveform and result    Interventional Radiology Adult/Peds IP Consult: Patient to be seen: URGENT within 4 hours; Call back #: [phone]; left pleural effusion with associate SOB and left shoulder pain; Requesting provider? Other supervising provider; Name: Elizabeth Castanon...     Status: None ()    Fidelia Wagner, CLEMENT CNP     7/6/2021  3:11 PM      Interventional Radiology Consult Service Note    IR consulted for possible left thoracentesis    This is a 86 year old female with a PMH of nonischemic   cardiomyopathy, aortic insufficiency, ascending aortic aneurysm,   temporal giant cell arteritis, PAF on Coumadin, S/P AVR, ICD in   place, HTN, HLD, h/o lung cancer S/P lobectomy, h/o COVID-19   infection, MIGUELANGEL, oxygen dependent, IBS, GERD, GI bleed and S/P   cholecystectomy who presents to the ED today complaining of   possible fluid in lungs. IR is consulted for left pleural   effusion.    Case and imaging was reviewed with Dr. Phan from IR. Based   on chest xray IR would not offer thoracentesis as this is   unlikely to provide any therapeutic benefit given small size. Can   proceed with US of the pleural effusion (ordered) to further   evaluate pleural effusion. Agree with continued work-up by CVTS   to evaluate for source of SOB.     Recommendations were reviewed with Elizabeth Castanon NP. IR will  "  follow-up on US.     Vitals:   /58   Pulse 68   Temp 97.5  F (36.4  C) (Oral)   Resp 24     Ht 1.575 m (5' 2\")   Wt 61.7 kg (136 lb)   SpO2 96%   BMI   24.87 kg/m      Pertinent Labs:     Lab Results   Component Value Date    WBC 7.1 2021    WBC 7.4 2021    WBC 5.7 2021       Lab Results   Component Value Date    HGB 9.2 2021    HGB 8.0 2021    HGB 8.7 2021       Lab Results   Component Value Date     2021     2021     2021       Lab Results   Component Value Date    INR 2.10 (H) 2021     (H) 06/15/2021       Lab Results   Component Value Date    POTASSIUM 4.1 2021        CLEMENT Wise CNP  Interventional Radiology   Pager: [phone]     Echocardiogram Complete     Status: None    Narrative    [id number]  [id number]  [id number]  [id number]^PAULINE^TANJA^SARAVANAN     [facility],[place]  Echocardiography Laboratory  [address] [place] [address]     Name: ABRAM BALDERAS  MRN: [id number]  : 1935  Study Date: 2021 02:46 PM  Age: 86 yrs  Gender: Female  Patient Location: [facility]  Reason For Study: CAD, Chest Pain  History: S/P AVR with 23mm Trifecta tissue valve on 6/3/2015 at [facility]  and S/P Aortic aneurysm repair with 32mm Hemashield Platinum on 2021 at  [facility].     Ordering Physician: TANJA CARPENTER  Performed By: Viridiana Hall RDCS     BSA: 1.6 m2  Height: 62 in  Weight: 136 lb  HR: 71  BP: 135/58 mmHg  ______________________________________________________________________________  Procedure  Complete Portable Echo Adult.  ______________________________________________________________________________  Interpretation Summary  1. Global and regional left ventricular function is normal with an EF of 60-  65%.  2. Global right ventricular function is normal.  3. Post AVR with 23mm Trifecta tissue valve in  and post aortic aneurysm  repair " with 32mm Hemashield [place] in June 2021. Aortic valve is well seated  and opens normally. Doppler interrogation of the aortic valve is normal. Mild  aortic insufficiency is present.  4. Pulmonary artery systolic pressure is normal.  5. Mild dilatation of the aorta is present. Sinus of Valsalve 3.9 cm, 2.4  cm/m^2 indexed to BSA. Ascending aorta is not well visualized.  6. No pericardial effusion is present.  7. A left pleural effusion is present.     This study was compared with the study from 6/23/2021. There has been no  change.     ______________________________________________________________________________  Left Ventricle  Left ventricular size is normal. Global and regional left ventricular function  is normal with an EF of 60-65%. Mild concentric wall thickening consistent  with left ventricular hypertrophy is present. Left ventricular diastolic  function is not assessable.     Right Ventricle  The right ventricle is normal size. Global right ventricular function is  normal. A pacemaker lead is noted in the right ventricle.     Atria  The atria cannot be assessed.     Mitral Valve  Moderate mitral annular calcification is present. Trace mitral insufficiency  is present.     Aortic Valve  Mild aortic insufficiency is present. S/P AVR with 23mm Trifecta tissue valve  on 6/3/2015 at [place] [facility] and S/P Aortic aneurysm repair with 32mm Hemashield  Platinum on 6/16/2021 at [facility]. Aortic valve is well seated and opens normally.  Doppler interrogation of the aortic valve is normal. The peak velocity across  the aortic valve is 1.9 m/sec. The mean gradient is 14 mmHg.     Tricuspid Valve  Mild tricuspid insufficiency is present. The right ventricular systolic  pressure is approximated at 21.0 mmHg plus the right atrial pressure.  Pulmonary artery systolic pressure is normal.     Pulmonic Valve  The pulmonic valve cannot be assessed.     Vessels  The inferior vena cava was normal in size with preserved  respiratory  variability. Mild dilatation of the aorta is present. Sinus of Valsalve 3.9  cm, 2.4 cm/m^2 indexed to BSA. Ascending aorta 3.3 cm. IVC diameter <2.1 cm  collapsing >50% with sniff suggests a normal RA pressure of 3 mmHg.     Pericardium  No pericardial effusion is present.     Miscellaneous  A left pleural effusion is present.     Compared to Previous Study  This study was compared with the study from 2021 . There has been no  change.     Attestation  I have personally viewed the imaging and agree with the interpretation and  report as documented by the fellow, Dawn Rashid, and/or edited by me.  ______________________________________________________________________________  MMode/2D Measurements & Calculations  IVSd: 1.2 cm  LVIDd: 3.9 cm  LVIDs: 2.5 cm  LVPWd: 1.2 cm  FS: 34.9 %  LV mass(C)d: 160.3 grams  LV mass(C)dI: 98.8 grams/m2  Ao root diam: 3.9 cm  asc Aorta Diam: 3.3 cm  LVOT diam: 1.9 cm  LVOT area: 2.8 cm2  RWT: 0.64     Doppler Measurements & Calculations  Ao V2 max: 189.0 cm/sec  Ao max P.0 mmHg  Ao V2 mean: 119.0 cm/sec  Ao mean P.0 mmHg  Ao V2 VTI: 33.0 cm  RUPESH(I,D): 1.5 cm2  RUPESH(V,D): 1.4 cm2  LV V1 max PG: 3.6 mmHg  LV V1 max: 94.6 cm/sec  LV V1 VTI: 18.0 cm  SV(LVOT): 51.0 ml  SI(LVOT): 31.5 ml/m2  TR max tray: 229.0 cm/sec  TR max P.0 mmHg  AV Tray Ratio (DI): 0.50  RUPESH Index (cm2/m2): 0.95     ______________________________________________________________________________  Report approved by: Marline Bello 2021 03:24 PM           Medications - No data to display     Assessments & Plan (with Medical Decision Making)  86-year-old female presented ER with some left scapular pain and shortness of breath that has been going on since she was discharged in the hospital.  To 3 weeks ago patient had graft of ascending aorta.  Had a pleural effusion that required thoracentesis in the hospital that point.  No fevers or chills otherwise presented ER for valuation CV  surgery did see the patient also.  Chest x-ray shows just a mild effusion verified by intermittent radiology by ultrasound not able to obtain any recommendations for thoracentesis.  Mono was negative.  Labs otherwise stable formal echo reviewed by CV surgery also appears stable within normal limits no concern about the pericardial effusion or wall motion abnormalities aortic graft appears stable along with aortic valve flow etc.  Cardiac patient is comfortable at home CV surgery agrees at this point patient is anticoagulated on Coumadin for intermittent A. fib.  Her INR is over 2.  At this point less likely PE and talking to her she is had the symptoms on and off since she was discharged in the hospital has not required oxygen for the last couple days discomfort going home Robaxin prescribed for potential muscle spasm monitor symptoms follow-up with MD as they live in [place] approximately 100 miles away.  Will follow up with them return if worsening symptoms comfortably discharged including  and daughter.       I have reviewed the nursing notes. I have reviewed the findings, diagnosis, plan and need for follow up with the patient.    Discharge Medication List as of 7/6/2021  5:04 PM      START taking these medications    Details   !! methocarbamol (ROBAXIN) 500 MG tablet Take 1 tablet (500 mg) by mouth 3 times daily as needed for muscle spasms, Disp-10 tablet, R-0, Local Print       !! - Potential duplicate medications found. Please discuss with provider.          Final diagnoses:   Pain of left scapula   Recurrent left pleural effusion   Postoperative state   I, Glendy Almodovar, am serving as a trained medical scribe to document services personally performed by Chadd Gloria MD, based on the provider's statements to me.     I, Chadd Gloria MD, was physically present and have reviewed and verified the accuracy of this note documented by Glendy Almodovar.     --  Chadd Gloria MD  [facility]  [facility] EMERGENCY DEPARTMENT  7/6/2021    This note was created at least in part by the use of dragon voice dictation system. Inadvertent typographical errors may still exist.  Chadd Gloria MD.    Patient evaluated in the emergency department during the COVID-19 pandemic period. Careful attention to patients safety was addressed throughout the evaluation. Evaluation and treatment management was initiated with disposition made efficiently and appropriate as possible to minimize any risk of potential exposure to patient during this evaluation.       Chadd Gloria MD  07/06/21 [id number]

## 2021-07-06 NOTE — DISCHARGE INSTRUCTIONS
Home.  You were seen by CV surgery in the ER also.  You had an xray that showed a smaller amount of fluid in the left lower lung that could still be causing some of your shoulder blade pain.  Other labs and echo look good.  CV surgery felt OK to go home with robaxin if needed as this could be muscle spasm.  See MD  Use home oxygen if needed.  Return if any change in symptoms at all.    Results for orders placed or performed during the hospital encounter of 07/06/21   XR Chest 2 Views     Status: None    Narrative    EXAM: XR CHEST 2 VW  7/6/2021 2:07 PM     HISTORY:  SOA/effusion       COMPARISON:  Chest x-ray 6/25/2021, 6/22/2021.    FINDINGS:   PA and lateral upright chest x-ray. Postsurgical changes in the chest.  Median sternotomy wires are intact. Left chest wall ICD with leads  projecting over the right atrium and right ventricle.    Trachea is midline. Cardiac silhouette is within normal limits.  Pulmonary vasculature is indistinct. Aortic arch calcifications. Small  left pleural effusion, increased from prior exam also loculated in the  left apex. No pleural effusion on the right. No pneumothorax.  Stable  9 mm subpleural nodule overlying the lateral right lower lobe.    No acute osseous abnormality. Multilevel degenerative changes of the  visualized thoracolumbar spine. Visualized soft tissues and upper  abdomen are unremarkable. Postsurgical cholecystectomy clips in the  right upper quadrant.      Impression    IMPRESSION:   1. Small left pleural effusion, increased when compared to prior exam.  No new focal airspace opacity. Right pleural effusion has resolved.  2. Stable right lower lobe nodule..    I have personally reviewed the examination and initial interpretation  and I agree with the findings.    MARY NICOLE MD         SYSTEM ID:  [id number]   US Chest Pleural Effusion Imaging     Status: None    Narrative    EXAMINATION: US CHEST/PLEURA 7/6/2021 3:33 PM     COMPARISON: None.    HISTORY: Evaluate  for drainable effusion    TECHNIQUE: Gray scale ultrasound of the chest.    FINDINGS:  There is a small amount of anechoic pleural effusion on the left side,  measuring 5.5 x 5.1 X 4.2 cm in the supine position. No effusion on  the right side.      Impression    IMPRESSION:  1.  Smaller left pleural effusion    ROBERT WRIGHT MD         SYSTEM ID:  [id number]   CBC with platelets differential     Status: Abnormal   Result Value Ref Range    WBC 7.1 4.0 - 11.0 10e9/L    RBC Count 3.08 (L) 3.8 - 5.2 10e12/L    Hemoglobin 9.2 (L) 11.7 - 15.7 g/dL    Hematocrit 29.0 (L) 35.0 - 47.0 %    MCV 94 78 - 100 fl    MCH 29.9 26.5 - 33.0 pg    MCHC 31.7 31.5 - 36.5 g/dL    RDW 16.5 (H) 10.0 - 15.0 %    Platelet Count 259 150 - 450 10e9/L    Diff Method Automated Method     % Neutrophils 60.2 %    % Lymphocytes 20.7 %    % Monocytes 15.2 %    % Eosinophils 3.5 %    % Basophils 0.3 %    % Immature Granulocytes 0.1 %    Nucleated RBCs 0 0 /100    Absolute Neutrophil 4.3 1.6 - 8.3 10e9/L    Absolute Lymphocytes 1.5 0.8 - 5.3 10e9/L    Absolute Monocytes 1.1 0.0 - 1.3 10e9/L    Absolute Eosinophils 0.3 0.0 - 0.7 10e9/L    Absolute Basophils 0.0 0.0 - 0.2 10e9/L    Abs Immature Granulocytes 0.0 0 - 0.4 10e9/L    Absolute Nucleated RBC 0.0    INR     Status: Abnormal   Result Value Ref Range    INR 2.10 (H) 0.86 - 1.14   Basic metabolic panel     Status: Abnormal   Result Value Ref Range    Sodium 140 133 - 144 mmol/L    Potassium 4.1 3.4 - 5.3 mmol/L    Chloride 105 94 - 109 mmol/L    Carbon Dioxide 33 (H) 20 - 32 mmol/L    Anion Gap 3 3 - 14 mmol/L    Glucose 99 70 - 99 mg/dL    Urea Nitrogen 42 (H) 7 - 30 mg/dL    Creatinine 1.61 (H) 0.52 - 1.04 mg/dL    GFR Estimate 29 (L) >60 mL/min/[1.73_m2]    GFR Estimate If Black 33 (L) >60 mL/min/[1.73_m2]    Calcium 9.0 8.5 - 10.1 mg/dL   Nt probnp inpatient     Status: Abnormal   Result Value Ref Range    N-Terminal Pro BNP Inpatient 2,957 (H) 0 - 1,800 pg/mL   Troponin I     Status: None  "  Result Value Ref Range    Troponin I ES <0.015 0.000 - 0.045 ug/L   EKG 12 lead     Status: None (Preliminary result)   Result Value Ref Range    Interpretation ECG Click View Image link to view waveform and result    Interventional Radiology Adult/Peds IP Consult: Patient to be seen: URGENT within 4 hours; Call back #: [phone]; left pleural effusion with associate SOB and left shoulder pain; Requesting provider? Other supervising provider; Name: Elizabeth Castanon...     Status: None ()    Fidelia Wagner, CLEMENT VAZQUEZ     7/6/2021  3:11 PM      Interventional Radiology Consult Service Note    IR consulted for possible left thoracentesis    This is a 86 year old female with a PMH of nonischemic   cardiomyopathy, aortic insufficiency, ascending aortic aneurysm,   temporal giant cell arteritis, PAF on Coumadin, S/P AVR, ICD in   place, HTN, HLD, h/o lung cancer S/P lobectomy, h/o COVID-19   infection, MIGUELANGEL, oxygen dependent, IBS, GERD, GI bleed and S/P   cholecystectomy who presents to the ED today complaining of   possible fluid in lungs. IR is consulted for left pleural   effusion.    Case and imaging was reviewed with Dr. Phan from IR. Based   on chest xray IR would not offer thoracentesis as this is   unlikely to provide any therapeutic benefit given small size. Can   proceed with US of the pleural effusion (ordered) to further   evaluate pleural effusion. Agree with continued work-up by CVTS   to evaluate for source of SOB.     Recommendations were reviewed with Elizabeth Castanon NP. IR will   follow-up on US.     Vitals:   /58   Pulse 68   Temp 97.5  F (36.4  C) (Oral)   Resp 24     Ht 1.575 m (5' 2\")   Wt 61.7 kg (136 lb)   SpO2 96%   BMI   24.87 kg/m      Pertinent Labs:     Lab Results   Component Value Date    WBC 7.1 07/06/2021    WBC 7.4 06/23/2021    WBC 5.7 06/22/2021       Lab Results   Component Value Date    HGB 9.2 07/06/2021    HGB 8.0 06/26/2021    HGB 8.7 06/23/2021 "       Lab Results   Component Value Date     2021     2021     2021       Lab Results   Component Value Date    INR 2.10 (H) 2021     (H) 06/15/2021       Lab Results   Component Value Date    POTASSIUM 4.1 2021        CLEMENT Wise CNP  Interventional Radiology   Pager: [phone]     Echocardiogram Complete     Status: None    Narrative    [id number]  Critical access hospital  [id number]  [id number]^PAULINE^TANJA^SARAVANAN     [facility],[place]  Echocardiography Laboratory  [address] [place] [address]     Name: ABRAM BALDERAS  MRN: [id number]  : 1935  Study Date: 2021 02:46 PM  Age: 86 yrs  Gender: Female  Patient Location: [facility]  Reason For Study: CAD, Chest Pain  History: S/P AVR with 23mm Trifecta tissue valve on 6/3/2015 at [facility]  and S/P Aortic aneurysm repair with 32mm Hemashield Platinum on 2021 at  [facility].     Ordering Physician: TANJA CARPENTER  Performed By: Viridiana Hall RDCS     BSA: 1.6 m2  Height: 62 in  Weight: 136 lb  HR: 71  BP: 135/58 mmHg  ______________________________________________________________________________  Procedure  Complete Portable Echo Adult.  ______________________________________________________________________________  Interpretation Summary  1. Global and regional left ventricular function is normal with an EF of 60-  65%.  2. Global right ventricular function is normal.  3. Post AVR with 23mm Trifecta tissue valve in  and post aortic aneurysm  repair with 32mm Hemashield [place] in 2021. Aortic valve is well seated  and opens normally. Doppler interrogation of the aortic valve is normal. Mild  aortic insufficiency is present.  4. Pulmonary artery systolic pressure is normal.  5. Mild dilatation of the aorta is present. Sinus of Valsalve 3.9 cm, 2.4  cm/m^2 indexed to BSA. Ascending aorta is not well visualized.  6. No pericardial effusion is  present.  7. A left pleural effusion is present.     This study was compared with the study from 6/23/2021. There has been no  change.     ______________________________________________________________________________  Left Ventricle  Left ventricular size is normal. Global and regional left ventricular function  is normal with an EF of 60-65%. Mild concentric wall thickening consistent  with left ventricular hypertrophy is present. Left ventricular diastolic  function is not assessable.     Right Ventricle  The right ventricle is normal size. Global right ventricular function is  normal. A pacemaker lead is noted in the right ventricle.     Atria  The atria cannot be assessed.     Mitral Valve  Moderate mitral annular calcification is present. Trace mitral insufficiency  is present.     Aortic Valve  Mild aortic insufficiency is present. S/P AVR with 23mm Trifecta tissue valve  on 6/3/2015 at [facility] and S/P Aortic aneurysm repair with 32mm Hemashield  Platinum on 6/16/2021 at [facility]. Aortic valve is well seated and opens normally.  Doppler interrogation of the aortic valve is normal. The peak velocity across  the aortic valve is 1.9 m/sec. The mean gradient is 14 mmHg.     Tricuspid Valve  Mild tricuspid insufficiency is present. The right ventricular systolic  pressure is approximated at 21.0 mmHg plus the right atrial pressure.  Pulmonary artery systolic pressure is normal.     Pulmonic Valve  The pulmonic valve cannot be assessed.     Vessels  The inferior vena cava was normal in size with preserved respiratory  variability. Mild dilatation of the aorta is present. Sinus of Valsalve 3.9  cm, 2.4 cm/m^2 indexed to BSA. Ascending aorta 3.3 cm. IVC diameter <2.1 cm  collapsing >50% with sniff suggests a normal RA pressure of 3 mmHg.     Pericardium  No pericardial effusion is present.     Miscellaneous  A left pleural effusion is present.     Compared to Previous Study  This study was compared with the study  from 2021 . There has been no  change.     Attestation  I have personally viewed the imaging and agree with the interpretation and  report as documented by the fellow, Dawn Rashid, and/or edited by me.  ______________________________________________________________________________  MMode/2D Measurements & Calculations  IVSd: 1.2 cm  LVIDd: 3.9 cm  LVIDs: 2.5 cm  LVPWd: 1.2 cm  FS: 34.9 %  LV mass(C)d: 160.3 grams  LV mass(C)dI: 98.8 grams/m2  Ao root diam: 3.9 cm  asc Aorta Diam: 3.3 cm  LVOT diam: 1.9 cm  LVOT area: 2.8 cm2  RWT: 0.64     Doppler Measurements & Calculations  Ao V2 max: 189.0 cm/sec  Ao max P.0 mmHg  Ao V2 mean: 119.0 cm/sec  Ao mean P.0 mmHg  Ao V2 VTI: 33.0 cm  RUPESH(I,D): 1.5 cm2  RUPESH(V,D): 1.4 cm2  LV V1 max PG: 3.6 mmHg  LV V1 max: 94.6 cm/sec  LV V1 VTI: 18.0 cm  SV(LVOT): 51.0 ml  SI(LVOT): 31.5 ml/m2  TR max tray: 229.0 cm/sec  TR max P.0 mmHg  AV Tray Ratio (DI): 0.50  RUPESH Index (cm2/m2): 0.95     ______________________________________________________________________________  Report approved by: Marline Bello 2021 03:24 PM

## 2021-07-06 NOTE — CONSULTS
"    Interventional Radiology Consult Service Note    IR consulted for possible left thoracentesis    This is a 86 year old female with a PMH of nonischemic cardiomyopathy, aortic insufficiency, ascending aortic aneurysm, temporal giant cell arteritis, PAF on Coumadin, S/P AVR, ICD in place, HTN, HLD, h/o lung cancer S/P lobectomy, h/o COVID-19 infection, MIGUELANGEL, oxygen dependent, IBS, GERD, GI bleed and S/P cholecystectomy who presents to the ED today complaining of possible fluid in lungs. IR is consulted for left pleural effusion.    Case and imaging was reviewed with Dr. Phan from IR. Based on chest xray IR would not offer thoracentesis as this is unlikely to provide any therapeutic benefit given small size. Can proceed with US of the pleural effusion (ordered) to further evaluate pleural effusion. Agree with continued work-up by CVTS to evaluate for source of SOB.     Recommendations were reviewed with Elizabeth Castanon NP. IR will follow-up on US.     Vitals:   /58   Pulse 68   Temp 97.5  F (36.4  C) (Oral)   Resp 24   Ht 1.575 m (5' 2\")   Wt 61.7 kg (136 lb)   SpO2 96%   BMI 24.87 kg/m      Pertinent Labs:     Lab Results   Component Value Date    WBC 7.1 07/06/2021    WBC 7.4 06/23/2021    WBC 5.7 06/22/2021       Lab Results   Component Value Date    HGB 9.2 07/06/2021    HGB 8.0 06/26/2021    HGB 8.7 06/23/2021       Lab Results   Component Value Date     07/06/2021     06/23/2021     06/22/2021       Lab Results   Component Value Date    INR 2.10 (H) 07/06/2021     (H) 06/15/2021       Lab Results   Component Value Date    POTASSIUM 4.1 07/06/2021        CLEMENT Wise CNP  Interventional Radiology   Pager: [phone]    "

## 2021-07-06 NOTE — PROGRESS NOTES
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 Smith on 6/15/2021.     Her postoperative course was notable for an DAYTON which resolved as well as a left pleural effusion for which a left thoracentesis was performed on 6/25/21. 450 mL of serous fluid was drained at that time.  Of note, she is on home oxygen at night time which is her baseline but she has been needing oxygen during the day and without it, desaturates with activity down to the mid 80's. This has improved over the past two days but she still feels short of breath. She lives in [place] and she has had two emergency department visits on 7/2/21 for shortness of breath. She was found to have a moderate left pleural effusion at that time but no intervention was performed. She presents to the Emergency Department here today at the direction of our care coordinator for persistent shortness of breath, panting at rest, and oxygen desaturations with activity. She states that she is about 4 lbs below her typical weight with no lower extremity edema to speak of but does have abdominal bloating after eating. She denies dizziness, palpitations or near syncope. She endorses fatigue, SOB at rest and with exertion, poor appetite.    Pertinent Positives: fatigue, anorexia, SOB at rest and with exertion, occasional productive cough, left shoulder pain, abdominal bloating.  Pertinent Negatives: orthopnea, PND, sudden weight gain, fever, chills, myalgias, headaches, double/blurry vision, dizziness,  palpitations, near syncope symptoms, chest pain, n/v/d, hemoptysis, hematemesis, hematochezia, recent exposure to sick contacts, recent exposure to COVID-19, recent travel.  No JVD.    PMH:  Past Medical History:   Diagnosis Date     Anemia 4/5/2021     Antiplatelet or antithrombotic long-term use      Aortic aneurysm (H) 3/24/2021     Aortic insufficiency 3/24/2021     Arthritis      Ascending aortic aneurysm (H)      Clinical diagnosis of COVID-19; 7/2020 4/5/2021     Congestive heart failure (H)      Gastroesophageal reflux disease without esophagitis 4/5/2021     GI bleed; 8/2019 4/5/2021     History of cholecystectomy 4/5/2021     History of lobectomy of lung; 2/13/2017 ([place]) 4/5/2021     History of lung cancer, right middle lobe; 2/2017 4/5/2021     Hyperlipidemia LDL goal <130 4/5/2021     Hypertension      ICD (implantable cardioverter-defibrillator); 2015 4/5/2021     Large B-cell lymphoma 2014 4/5/2021     Macular degeneration 4/5/2021     Nonischemic cardiomyopathy (H) 3/24/2021     MIGUELANGEL (obstructive sleep apnea) 4/5/2021     Osteopenia 4/5/2021     Oxygen dependent     2.5 l via NC at night time only     Pacemaker      Paroxysmal atrial fibrillation (H) 4/5/2021     S/P AVR (22 mm bioprosthetic valve) 3/24/2021     Temporal giant cell arteritis (H) 4/5/2021       PSH:  Past Surgical History:   Procedure Laterality Date     COLECTOMY PARTIAL       COLONOSCOPY       CV CORONARY ANGIOGRAM N/A 6/3/2021    Procedure: CV CORONARY ANGIOGRAM;  Surgeon: Codey Marks MD;  Location:  [facility]     ENDOSCOPY       LOBECTOMY LUNG       REPAIR ANEURYSM ASCENDING AORTA N/A 6/15/2021    Procedure: redo sternotomy, ascending aortic aneurysm  repair Size 23mm hemashield graft, Lysis of adhesions, On-pump oxygenation, Circulatory arrest, Trans espohageal echocardiogram;  Surgeon: Gokul Smith MD;  Location:  OR     REPAIR VALVE AORTIC       Past Surgical History:   Procedure Laterality  Date     COLECTOMY PARTIAL       COLONOSCOPY       CV CORONARY ANGIOGRAM N/A 6/3/2021    Procedure: CV CORONARY ANGIOGRAM;  Surgeon: Codey Marks MD;  Location:  [facility]     ENDOSCOPY       LOBECTOMY LUNG       REPAIR ANEURYSM ASCENDING AORTA N/A 6/15/2021    Procedure: redo sternotomy, ascending aortic aneurysm  repair Size 23mm hemashield graft, Lysis of adhesions, On-pump oxygenation, Circulatory arrest, Trans espohageal echocardiogram;  Surgeon: Gokul Smith MD;  Location:  OR     REPAIR VALVE AORTIC       FH:  Family History   Problem Relation Age of Onset     Anesthesia Reaction No family hx of      Deep Vein Thrombosis (DVT) No family hx of        SH:  Social History     Socioeconomic History     Marital status:      Spouse name: None     Number of children: None     Years of education: None     Highest education level: None   Occupational History     None   Social Needs     Financial resource strain: None     Food insecurity     Worry: None     Inability: None     Transportation needs     Medical: None     Non-medical: None   Tobacco Use     Smoking status: Former Smoker     Quit date: 1990     Years since quittin.4     Smokeless tobacco: Never Used   Substance and Sexual Activity     Alcohol use: Not Currently     Drug use: Not Currently     Sexual activity: None   Lifestyle     Physical activity     Days per week: None     Minutes per session: None     Stress: None   Relationships     Social connections     Talks on phone: None     Gets together: None     Attends Orthodoxy service: None     Active member of club or organization: None     Attends meetings of clubs or organizations: None     Relationship status: None     Intimate partner violence     Fear of current or ex partner: None     Emotionally abused: None     Physically abused: None     Forced sexual activity: None   Other Topics Concern     None   Social History Narrative     None       Home  Meds:  (Not in a hospital admission)      Allergies:  Allergies   Allergen Reactions     Amoxicillin      Atorvastatin      Codeine      Nsaids        ROS:  ROS: 10 point ROS neg other than the symptoms noted above in the HPI.    Physical Exam:  Temp:  [97.5  F (36.4  C)-97.6  F (36.4  C)] 97.5  F (36.4  C)  Pulse:  [68] 68  Resp:  [24] 24  BP: (119-135)/(51-58) 135/58  SpO2:  [96 %] 96 %  Gen: NAD, resting comfortably in bed, conversational  HEENT: normocephalic, atraumatic cranium, EOMI, sclerae anicteric. Oral mucosa pink and moist, n  Lungs: CTA in all fields, no wheezing or rhonchi, difficulty completing full sentences with some SOB, but not distress  CV: RRR, S1S2 normal, no murmur. Radial pulses and DP pulses symmetric. No dependent edema.   Abd: no scars, positive normal pitched bowel sounds, overall soft and non distended, nontender, no hepatosplenomegaly, no masses/guarding/rebound tenderness.   Musculoskeletal: grossly intact, strength 5/5 upper and lower extremities  Neuro: AOx3, CN II-VII grossly intact, sensation/motor intact in upper and lower extremities  Mental: normal mood and affect, regular rate of speech    Labs:  ABG No lab results found in last 7 days.  CBC  Recent Labs   Lab 07/06/21  1338   WBC 7.1   HGB 9.2*        BMP  Recent Labs   Lab 07/06/21  1338      POTASSIUM 4.1   CHLORIDE 105   CO2 33*   BUN 42*   CR 1.61*   GLC 99     LFT  Recent Labs   Lab 07/06/21  1338   INR 2.10*     PancreasNo lab results found in last 7 days.    Imaging:  CXR 7/6/2021:  IMPRESSION:   1. Small left pleural effusion, increased when compared to prior exam.  No new focal airspace opacity. Right pleural effusion has resolved.  2. Stable right lower lobe nodule.    Echo 7/6/21:  Interpretation Summary  1. Global and regional left ventricular function is normal with an EF of 60-  65%.  2. Global right ventricular function is normal.  3. Post AVR with 23mm Trifecta tissue valve in 2015 and post aortic  aneurysm  repair with 32mm Hemashield [place] in June 2021. Aortic valve is well seated  and opens normally. Doppler interrogation of the aortic valve is normal. Mild  aortic insufficiency is present.  4. Pulmonary artery systolic pressure is normal.  5. Mild dilatation of the aorta is present. Sinus of Valsalve 3.9 cm, 2.4  cm/m^2 indexed to BSA. Ascending aorta is not well visualized.  6. No pericardial effusion is present.  7. A left pleural effusion is present    Pleural US 7/6/2021  FINDINGS:  There is a small amount of anechoic pleural effusion on the left side,  measuring 5.5 x 5.1 X 4.2 cm in the supine position. No effusion on  the right side.                                                IMPRESSION:  1.  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 Smith on 6/15/2021. She is anticoagulated on warfarin for pAF. Presents with SOB at rest and with activity with concern for recurrent pleural effusion.    Ascending aortic aneurysm - s/p redo sternotomy and aortic aneuryrsm repair 6/15/21  Hx of bioprosthetic AVR in 2015  HFpEF  PFO   HTN  PAF anticoagulated with Warfarin  Echo on 6/23 demonstrated LVEF of 55-60%, normal RV function and gradient across aortic valve  9 mm Hg. She does have grade II diastolic dysfunction.  Does not appear to be in acute heart failure at this time. She is below her preoperative and typical weight of 130 lbs, weighing 124 lbs at home yesterday. Has been taking her Torsemide 20 mg daily.  CXR does not demonstrate concern for heart failure, fluid  overload  Troponin and EKG negative for ACS  Creatinine at baseline of 1.6  - Add on BNP  - Complete Echo is stable compared to postoperative echo; no pericardial effusion, AV gradient stable    Shortness of Breath  Left Shoulder Pain  Small left Pleural Effusion  Postoperative course was c/b left pleural effusion causing need for daytime oxygen (baseline uses O2 at night)  S/P left thoracentesis on 6/26 for 450 mL fluid drained  CXR today demonstrates small left pleural effusion, increased when compared to prior exam. Suspect this could be contributing to her shortness of breath. Less likely PE as patient is anticoagulated with Warfarin. INR 2.01 today. Not ACS- EKG and troponin negative. No infiltrate on CXR, no fevers, unlikely pneumonia. No pericardial effusion or pericarditis on Echo. AV gradient stable.  She has been using her incentive spirometer at home and is able to reach ~2410-3897 mLs and this has been stable  - IR agreeable to ultrasound the lung, unlikely there is enough to tap   - Small left effusion, not enough to drain  - Dependent upon workup, patient could likely go home today pending ED provider's recommendations  - Could be deconditioning after redo sternotomy c/b age; patient has been utilizing home PT but may not be getting enough activity at home vs what OP Cardiac Rehab is able to provide.  - Will fill a prescription for Robaxin  - Should continue to utilize IS and pulm hygiene efforts, work with therapies and stay mobile/active in an effort to regain strength  - Discussed with patient adding Boost or Ensure supplements d/t low appetite and increasing caloric needs while healing.     -Thank you for the opportunity to participate in the care of this patient.    CLEMENT Wilkins, ACNPC-AG, CCRN  Nurse Practitioner  Cardiothoracic Surgery  Pager: [phone]      2:25 PM  July 6, 2021

## 2021-07-06 NOTE — ED TRIAGE NOTES
Having pain in L shoulder blade for last couple nights and SOB. Had fluid drained off L lung during last hospital stay. Had recent cardiac procedure.

## 2021-07-06 NOTE — PROGRESS NOTES
Patient's daughter Dona called and states her mother was in the ER in [place] over the weekend with shortness of breath and L shoulder blade pain at the site of her thoracentesis performed 06/25/21.  Daughter asked this nurse to call the patient and discuss her status.    Called patient and she is audibly shortness of breath with panting in between words.  Patient starts she continues to have L should blade pain and she went to the ER in [place] and was discharged the next day without any interventions.  Patient states she will not go back there for any reason and wants to come back here to [facility].  Instructed patient to come to the ER.  Patient agreed to come the ER.  Called Elizabeth Castanon NP with update and she will watch for patient to come to ER.      Will call [place] and try to get records from hospitalization.

## 2021-07-22 ENCOUNTER — TRANSFERRED RECORDS (OUTPATIENT)
Dept: HEALTH INFORMATION MANAGEMENT | Facility: CLINIC | Age: 86
End: 2021-07-22

## 2021-07-22 ENCOUNTER — MEDICAL CORRESPONDENCE (OUTPATIENT)
Dept: CARDIOLOGY | Facility: CLINIC | Age: 86
End: 2021-07-22

## (undated) DEVICE — BONE WAX 2.5GM W31G

## (undated) DEVICE — SU SILK 0 TIE 6X30" A306H

## (undated) DEVICE — GLIDEWIRE TERUMO .035X180CM 1.5,, J-TIP GR3525

## (undated) DEVICE — SU STEEL 5 BE-2 2X30" 045-121

## (undated) DEVICE — SU VICRYL 3-0 FS-1 27" J442H

## (undated) DEVICE — SOL NACL 0.9% IRRIG 3000ML BAG 2B7477

## (undated) DEVICE — SU ETHIBOND 2-0 SHDA 30" X563H

## (undated) DEVICE — TUBING INSUFFLATION W/FILTER CPC TO LUER 620-030-301

## (undated) DEVICE — ESU HOLSTER PLASTIC DISP E2400

## (undated) DEVICE — SU PROLENE 4-0 SHDA 36" 8521H

## (undated) DEVICE — SU VICRYL 2-0 CT-1 27" UND J259H

## (undated) DEVICE — TIES BANDING T50R

## (undated) DEVICE — SLEEVE TR BAND RADIAL COMPRESSION DEVICE 24CM TRB24-REG

## (undated) DEVICE — DRSG TELFA 3X8" 1238

## (undated) DEVICE — ESU ELEC BLADE 6" COATED E1450-6

## (undated) DEVICE — GLOVE PROTEXIS POWDER FREE 7.0 ORTHOPEDIC 2D73ET70

## (undated) DEVICE — DRSG DRAIN 4X4" 7086

## (undated) DEVICE — ESU ELEC BLADE 2.75" COATED/INSULATED E1455

## (undated) DEVICE — DRAIN CHEST TUBE 32FR STR 8032

## (undated) DEVICE — SUCTION CATH AIRLIFE TRI-FLO W/CONTROL PORT 14FR  T60C

## (undated) DEVICE — ESU BIPOLAR SEALER AQUAMANTYS 6MM 23-112-1

## (undated) DEVICE — DRSG TEGADERM 8X12" 1629

## (undated) DEVICE — ESU PENCIL W/COATED BLADE E2450H

## (undated) DEVICE — TAPE MEDIPORE 4"X2YD 2864

## (undated) DEVICE — PROTECTOR ARM ONE-STEP TRENDELENBURG 40418

## (undated) DEVICE — SPONGE LAP 18X18" X8435

## (undated) DEVICE — CATH ANGIO INFINITI AL1 4FRX100CM 538445

## (undated) DEVICE — FASTENER CATH BALLOON CLAMPX2 STATLOCK 0684-00-493

## (undated) DEVICE — SU ETHIBOND 0 CT-1 CR 8X18" CX21D

## (undated) DEVICE — SUCTION MANIFOLD NEPTUNE 2 SYS 4 PORT 0702-020-000

## (undated) DEVICE — DRAPE IOBAN INCISE 23X17" 6650EZ

## (undated) DEVICE — NDL COUNTER 40CT  31142311

## (undated) DEVICE — SU PROLENE 4-0 RB-1DA 36" 8557H

## (undated) DEVICE — SU ETHIBOND 3-0 BBDA 36" X588H

## (undated) DEVICE — DRAIN CHEST TUBE RIGHT ANGLED 32FR 8132

## (undated) DEVICE — SU STEEL 6 CCS 4X18" M654G

## (undated) DEVICE — CATH DIAG 4FR JL 6.0  538424

## (undated) DEVICE — BLADE SAW SAGITTAL STERNOTOMY CV SM LINVATEC 5023-187

## (undated) DEVICE — CATH ANGIO INFINITI JL5 4FRX100CM 538422

## (undated) DEVICE — SU PROLENE 3-0 SHDA 48" 8534H

## (undated) DEVICE — DRSG ABDOMINAL 07 1/2X8" 7197D

## (undated) DEVICE — SUCTION DRY CHEST DRAIN OASIS 3600-100

## (undated) DEVICE — SUCTION TIP YANKAUER STR K87

## (undated) DEVICE — WIPES FOLEY CARE SURESTEP PROVON DFC100

## (undated) DEVICE — BLANKET BAIR ADLT PLYMR 60X36IN 63000

## (undated) DEVICE — Device

## (undated) DEVICE — INTRO GLIDESHEATH SLENDER 6FR 10X45CM 60-1060

## (undated) DEVICE — DEFIB PRO-PADZ LVP LQD GEL ADULT 8900-2105-01

## (undated) DEVICE — MANIFOLD KIT ANGIO AUTOMATED 014613

## (undated) DEVICE — SU PROLENE 3-0 SHDA 36" 8522H

## (undated) DEVICE — DRAPE FLUID WARMING 52 X 60" ORS-321

## (undated) DEVICE — LEAD PACER MYOCARDIAL BIPOLAR TEMPORARY 53CM 6495F

## (undated) DEVICE — TUBING PRESSURE 30"

## (undated) DEVICE — KIT HAND CONTROL ACIST 014644 AR-P54

## (undated) DEVICE — SOL NACL 0.9% 10ML VIAL 0409-4888-02

## (undated) DEVICE — SU PROLENE 6-0 C-1DA 30" 8706H

## (undated) DEVICE — PACK HEART LEFT CUSTOM

## (undated) DEVICE — DRAIN CHEST TUBE RIGHT ANGLED 28FR 8128

## (undated) DEVICE — PACK ADULT HEART UMMC PV15CG92D

## (undated) DEVICE — SOL NACL 0.9% IRRIG 1000ML BOTTLE 2F7124

## (undated) DEVICE — HEMOBLAST

## (undated) DEVICE — SURGICEL HEMOSTAT 4X8" 1952

## (undated) DEVICE — ADH SKIN CLOSURE PREMIERPRO EXOFIN 1.0ML 3470

## (undated) DEVICE — SPONGE RAY-TEC 4X8" 7318

## (undated) DEVICE — DRAPE SHEET REV FOLD 3/4 9349

## (undated) DEVICE — ESU EYE LOW TEMP AA17

## (undated) DEVICE — SU PROLENE 5-0 RB-2DA 30" 8710H

## (undated) DEVICE — TUBING SUCTION DRAINAGE PLEURAL DUAL 8884714200

## (undated) DEVICE — CLIP HORIZON MED BLUE 002200

## (undated) DEVICE — SU VICRYL 0 CT-1 27" UND J260H

## (undated) DEVICE — TOURNIQUET VASCULAR KIT 7 1/2" 79012

## (undated) DEVICE — SU VICRYL 0 CTX 36" J370H

## (undated) DEVICE — SU PLEDGET SOFT TFE 3/8"X3/26"X1/16" PCP40

## (undated) RX ORDER — LIDOCAINE HYDROCHLORIDE 20 MG/ML
INJECTION, SOLUTION EPIDURAL; INFILTRATION; INTRACAUDAL; PERINEURAL
Status: DISPENSED
Start: 2021-06-15

## (undated) RX ORDER — NITROGLYCERIN 5 MG/ML
VIAL (ML) INTRAVENOUS
Status: DISPENSED
Start: 2021-06-03

## (undated) RX ORDER — HEPARIN SODIUM 1000 [USP'U]/ML
INJECTION, SOLUTION INTRAVENOUS; SUBCUTANEOUS
Status: DISPENSED
Start: 2021-06-03

## (undated) RX ORDER — ALBUMIN, HUMAN INJ 5% 5 %
SOLUTION INTRAVENOUS
Status: DISPENSED
Start: 2021-06-15

## (undated) RX ORDER — HEPARIN SODIUM 1000 [USP'U]/ML
INJECTION, SOLUTION INTRAVENOUS; SUBCUTANEOUS
Status: DISPENSED
Start: 2021-06-15

## (undated) RX ORDER — METOPROLOL TARTRATE 25 MG/1
TABLET, FILM COATED ORAL
Status: DISPENSED
Start: 2021-04-05

## (undated) RX ORDER — NITROGLYCERIN 0.4 MG/1
TABLET SUBLINGUAL
Status: DISPENSED
Start: 2021-04-05

## (undated) RX ORDER — CHLORHEXIDINE GLUCONATE ORAL RINSE 1.2 MG/ML
SOLUTION DENTAL
Status: DISPENSED
Start: 2021-06-15

## (undated) RX ORDER — FENTANYL CITRATE 50 UG/ML
INJECTION, SOLUTION INTRAMUSCULAR; INTRAVENOUS
Status: DISPENSED
Start: 2021-06-15

## (undated) RX ORDER — ACETAMINOPHEN 325 MG/1
TABLET ORAL
Status: DISPENSED
Start: 2021-06-15

## (undated) RX ORDER — FENTANYL CITRATE-0.9 % NACL/PF 10 MCG/ML
PLASTIC BAG, INJECTION (ML) INTRAVENOUS
Status: DISPENSED
Start: 2021-06-15

## (undated) RX ORDER — CALCIUM CHLORIDE 100 MG/ML
INJECTION INTRAVENOUS; INTRAVENTRICULAR
Status: DISPENSED
Start: 2021-06-15

## (undated) RX ORDER — CLINDAMYCIN PHOSPHATE 900 MG/50ML
INJECTION, SOLUTION INTRAVENOUS
Status: DISPENSED
Start: 2021-06-15

## (undated) RX ORDER — GABAPENTIN 100 MG/1
CAPSULE ORAL
Status: DISPENSED
Start: 2021-06-15

## (undated) RX ORDER — FENTANYL CITRATE 50 UG/ML
INJECTION, SOLUTION INTRAMUSCULAR; INTRAVENOUS
Status: DISPENSED
Start: 2021-06-03

## (undated) RX ORDER — IVABRADINE 5 MG/1
TABLET, FILM COATED ORAL
Status: DISPENSED
Start: 2021-04-05

## (undated) RX ORDER — EPHEDRINE SULFATE 50 MG/ML
INJECTION, SOLUTION INTRAMUSCULAR; INTRAVENOUS; SUBCUTANEOUS
Status: DISPENSED
Start: 2021-06-15

## (undated) RX ORDER — HYDROMORPHONE HYDROCHLORIDE 1 MG/ML
INJECTION, SOLUTION INTRAMUSCULAR; INTRAVENOUS; SUBCUTANEOUS
Status: DISPENSED
Start: 2021-06-15

## (undated) RX ORDER — PROPOFOL 10 MG/ML
INJECTION, EMULSION INTRAVENOUS
Status: DISPENSED
Start: 2021-06-15